# Patient Record
Sex: FEMALE | Race: WHITE | NOT HISPANIC OR LATINO | Employment: PART TIME | URBAN - METROPOLITAN AREA
[De-identification: names, ages, dates, MRNs, and addresses within clinical notes are randomized per-mention and may not be internally consistent; named-entity substitution may affect disease eponyms.]

---

## 2018-06-22 ENCOUNTER — TRANSFERRED RECORDS (OUTPATIENT)
Dept: HEALTH INFORMATION MANAGEMENT | Facility: CLINIC | Age: 23
End: 2018-06-22

## 2018-06-22 LAB — PAP-ABSTRACT: NORMAL

## 2020-03-11 ENCOUNTER — HEALTH MAINTENANCE LETTER (OUTPATIENT)
Age: 25
End: 2020-03-11

## 2020-03-11 ENCOUNTER — OFFICE VISIT (OUTPATIENT)
Dept: FAMILY MEDICINE | Facility: CLINIC | Age: 25
End: 2020-03-11
Payer: COMMERCIAL

## 2020-03-11 VITALS
DIASTOLIC BLOOD PRESSURE: 78 MMHG | HEART RATE: 76 BPM | WEIGHT: 187 LBS | OXYGEN SATURATION: 98 % | BODY MASS INDEX: 28.34 KG/M2 | SYSTOLIC BLOOD PRESSURE: 126 MMHG | HEIGHT: 68 IN | TEMPERATURE: 98.7 F

## 2020-03-11 DIAGNOSIS — Z30.41 ENCOUNTER FOR SURVEILLANCE OF CONTRACEPTIVE PILLS: ICD-10-CM

## 2020-03-11 DIAGNOSIS — F41.1 GAD (GENERALIZED ANXIETY DISORDER): ICD-10-CM

## 2020-03-11 DIAGNOSIS — G43.009 MIGRAINE WITHOUT AURA AND WITHOUT STATUS MIGRAINOSUS, NOT INTRACTABLE: ICD-10-CM

## 2020-03-11 PROCEDURE — 99203 OFFICE O/P NEW LOW 30 MIN: CPT | Performed by: FAMILY MEDICINE

## 2020-03-11 RX ORDER — SUMATRIPTAN 25 MG/1
25 TABLET, FILM COATED ORAL
COMMUNITY
End: 2021-06-16

## 2020-03-11 RX ORDER — NORETHINDRONE AND ETHINYL ESTRADIOL 0.5-0.035
KIT ORAL
COMMUNITY
Start: 2019-11-25 | End: 2020-12-27

## 2020-03-11 RX ORDER — PROMETHAZINE HYDROCHLORIDE 25 MG/1
25 TABLET ORAL EVERY 6 HOURS PRN
COMMUNITY
End: 2020-12-27

## 2020-03-11 RX ORDER — CITALOPRAM HYDROBROMIDE 20 MG/1
20 TABLET ORAL
COMMUNITY
Start: 2019-12-16 | End: 2020-12-27

## 2020-03-11 RX ORDER — CITALOPRAM HYDROBROMIDE 20 MG/1
20 TABLET ORAL DAILY
Qty: 90 TABLET | Refills: 1 | Status: SHIPPED | OUTPATIENT
Start: 2020-03-11 | End: 2020-12-27

## 2020-03-11 RX ORDER — ONDANSETRON 4 MG/1
4 TABLET, FILM COATED ORAL EVERY 8 HOURS PRN
COMMUNITY
End: 2021-06-16

## 2020-03-11 ASSESSMENT — ANXIETY QUESTIONNAIRES
5. BEING SO RESTLESS THAT IT IS HARD TO SIT STILL: NOT AT ALL
3. WORRYING TOO MUCH ABOUT DIFFERENT THINGS: MORE THAN HALF THE DAYS
GAD7 TOTAL SCORE: 10
1. FEELING NERVOUS, ANXIOUS, OR ON EDGE: MORE THAN HALF THE DAYS
IF YOU CHECKED OFF ANY PROBLEMS ON THIS QUESTIONNAIRE, HOW DIFFICULT HAVE THESE PROBLEMS MADE IT FOR YOU TO DO YOUR WORK, TAKE CARE OF THINGS AT HOME, OR GET ALONG WITH OTHER PEOPLE: SOMEWHAT DIFFICULT
2. NOT BEING ABLE TO STOP OR CONTROL WORRYING: SEVERAL DAYS
6. BECOMING EASILY ANNOYED OR IRRITABLE: MORE THAN HALF THE DAYS
7. FEELING AFRAID AS IF SOMETHING AWFUL MIGHT HAPPEN: SEVERAL DAYS

## 2020-03-11 ASSESSMENT — PATIENT HEALTH QUESTIONNAIRE - PHQ9
5. POOR APPETITE OR OVEREATING: MORE THAN HALF THE DAYS
SUM OF ALL RESPONSES TO PHQ QUESTIONS 1-9: 4

## 2020-03-11 ASSESSMENT — MIFFLIN-ST. JEOR: SCORE: 1643.08

## 2020-03-11 NOTE — PROGRESS NOTES
"Subjective     Crhista Erickson is a 24 year old female who presents to clinic today for the following health issues:    HPI     Depression and Anxiety Follow-Up    How are you doing with your depression since your last visit? Improved     How are you doing with your anxiety since your last visit?  Improved     Are you having other symptoms that might be associated with depression or anxiety? Yes:  panic attacks     Have you had a significant life event? No     Do you have any concerns with your use of alcohol or other drugs? No    Social History     Tobacco Use     Smoking status: Never Smoker     Smokeless tobacco: Never Used   Substance Use Topics     Alcohol use: Yes     Comment: occ      Drug use: Never     PHQ 3/11/2020   PHQ-9 Total Score 4   Q9: Thoughts of better off dead/self-harm past 2 weeks Not at all     OPAL-7 SCORE 3/11/2020   Total Score 10       OPAL - Doing very well with medication. No side effects.   Migraines - stable.       Reviewed and updated as needed this visit by Provider         Review of Systems   ROS COMP: Constitutional, HEENT, cardiovascular, pulmonary, gi and gu systems are negative, except as otherwise noted.      Objective    There were no vitals taken for this visit.  There is no height or weight on file to calculate BMI.  Physical Exam   /78 (BP Location: Right arm, Patient Position: Sitting, Cuff Size: Adult Regular)   Pulse 76   Temp 98.7  F (37.1  C) (Temporal)   Ht 1.737 m (5' 8.4\")   Wt 84.8 kg (187 lb)   LMP 03/03/2020 (Exact Date)   SpO2 98%   Breastfeeding No   BMI 28.10 kg/m    GENERAL: healthy, alert and no distress  EYES: Eyes grossly normal to inspection  HENT: nose and mouth without ulcers or lesions  RESP: non labored   MS: no gross musculoskeletal defects noted  SKIN: no suspicious lesions or rashes  NEURO: mentation intact and speech normal  PSYCH: mentation appears normal, affect normal    Diagnostic Test Results:  none         Assessment & Plan     1. " OPAL (generalized anxiety disorder)  - stable, refilled below  - citalopram (CELEXA) 20 MG tablet; Take 1 tablet (20 mg) by mouth daily  Dispense: 90 tablet; Refill: 1    2. Migraine without aura and without status migrainosus, not intractable  - stable     3. Encounter for surveillance of contraceptive pills  - refilled   - norethindrone-ethinyl estradiol (NECON) 0.5-35 MG-MCG tablet; Take 1 tablet by mouth daily  Dispense: 84 tablet; Refill: 3    Return in about 6 months (around 9/11/2020) for Routine Visit, e-visit.    Dallin Powers MD  [facility]

## 2020-03-12 ASSESSMENT — ANXIETY QUESTIONNAIRES: GAD7 TOTAL SCORE: 10

## 2020-03-27 ENCOUNTER — E-VISIT (OUTPATIENT)
Dept: FAMILY MEDICINE | Facility: CLINIC | Age: 25
End: 2020-03-27
Payer: COMMERCIAL

## 2020-03-27 DIAGNOSIS — H92.09 DISCOMFORT OF EAR, UNSPECIFIED LATERALITY: Primary | ICD-10-CM

## 2020-03-27 PROCEDURE — 99421 OL DIG E/M SVC 5-10 MIN: CPT | Performed by: FAMILY MEDICINE

## 2020-11-19 ENCOUNTER — VIRTUAL VISIT (OUTPATIENT)
Dept: FAMILY MEDICINE | Facility: OTHER | Age: 25
End: 2020-11-19

## 2020-11-19 NOTE — PROGRESS NOTES
"Date: 2020 16:58:51  Clinician: Edgar Infante  Clinician NPI: [id number]  Patient: Christa Erickson  Patient : 1995  Patient Address: [address] [place] [address]  Patient Phone: [phone]  Visit Protocol: URI  Patient Summary:  Christa is a 24 year old ( : 1995 ) female who initiated a [facility] Visit for COVID-19 (Coronavirus) evaluation and screening. When asked the question \"Please sign me up to receive news, health information and promotions. \", Christa responded \"No\".    Christa states her symptoms started today. She is experiencing mild difficulty breathing with activities but can speak normally in full sentences.   Christa denies having vomiting, rhinitis, facial pain or pressure, myalgias, chills, malaise, sore throat, teeth pain, ageusia, diarrhea, ear pain, headache, wheezing, fever, cough, nasal congestion, nausea, and anosmia. She also denies taking antibiotic medication in the past month and having recent facial or sinus surgery in the past 60 days.    Pertinent COVID-19 (Coronavirus) information  Christa does not work or volunteer as healthcare worker or a . In the past 14 days, Christa has not worked or volunteered at a healthcare facility or group living setting.   In the past 14 days, she also has not lived in a congregate living setting.   Christa has not had a close contact with a laboratory-confirmed COVID-19 patient within 14 days of symptom onset.    Since 2019, Christa has not been tested for COVID-19 and has had upper respiratory infection (URI) or influenza-like illness.      Date(s) of previous URI or influenza-like illness (free-text): 2020     Symptoms Christa experienced during previous URI or influenza-like illness as reported by the patient (free-text): Nauseas, stuffy nose, sore throat        Pertinent medical history  Christa does not get yeast infections when she takes antibiotics.   Christa does not need a return to work/school note.   " Weight: 186 lbs   Christa does not smoke or use smokeless tobacco.   She denies pregnancy and denies breastfeeding. She is currently menstruating.   Weight: 186 lbs    MEDICATIONS: Celexa oral, ALLERGIES: NKDA  Clinician Response:  Dear Christa,   Your symptoms show that you may have coronavirus (COVID-19). This illness can cause fever, cough and trouble breathing. Many people get a mild case and get better on their own. Some people can get very sick.  What should I do?  We would like to test you for this virus.   1. Please call [phone] to schedule your visit. Explain that you were referred by [facility] to have a COVID-19 test. Be [facility].  * If you need to schedule in [facility] please call [phone] or for [employer] employees please call [phone].  * If you need to schedule in the [place] area please call [phone]. [employer] employees call [phone].  The following will serve as your written order for this COVID Test, ordered by me, for the indication of suspected COVID [Z20.828]: The test will be ordered in [employer], our electronic health record, after you are scheduled. It will show as ordered and authorized by Vern Watkins MD.  Order: COVID-19 (Coronavirus) PCR for SYMPTOMATIC testing from [facility].   2. When it's time for your COVID test:  Stay at least 6 feet away from others. (If someone will drive you to your test, stay in the backseat, as far away from the  as you can.)   Cover your mouth and nose with a mask, tissue or washcloth.  Go straight to the testing site. Don't make any stops on the way there or back.      3.Starting now: Stay home and away from others (self-isolate) until:   You've had no fever---and no medicine that reduces fever---for one full day (24 hours). And...   Your other symptoms have gotten better. For example, your cough or breathing has improved. And...   At least 10 days have passed since your symptoms started.       During this time,  "don't leave the house except for testing or medical care.   Stay in your own room, even for meals. Use your own bathroom if you can.   Stay away from others in your home. No hugging, kissing or shaking hands. No visitors.  Don't go to work, school or anywhere else.    Clean \"high touch\" surfaces often (doorknobs, counters, handles, etc.). Use a household cleaning spray or wipes. You'll find a full list of  on the EPA website: www.epa.gov/pesticide-registration/list-n-disinfectants-use-against-sars-cov-2.   Cover your mouth and nose with a mask, tissue or washcloth to avoid spreading germs.  Wash your hands and face often. Use soap and water.  Caregivers in these groups are at risk for severe illness due to COVID-19:  o People 65 years and older  o People who live in a nursing home or long-term care facility  o People with chronic disease (lung, heart, cancer, diabetes, kidney, liver, immunologic)  o People who have a weakened immune system, including those who:   Are in cancer treatment  Take medicine that weakens the immune system, such as corticosteroids  Had a bone marrow or organ transplant  Have an immune deficiency  Have poorly controlled HIV or AIDS  Are obese (body mass index of 40 or higher)  Smoke regularly   o Caregivers should wear gloves while washing dishes, handling laundry and cleaning bedrooms and bathrooms.  o Use caution when washing and drying laundry: Don't shake dirty laundry, and use the warmest water setting that you can.  o For more tips, go to www.cdc.gov/coronavirus/2019-ncov/downloads/10Things.pdf.    4.Sign up for [employer]. We know it's scary to hear that you might have COVID-19. We want to track your symptoms to make sure you're okay over the next 2 weeks. Please look for an email from Robert Daqi---this is a free, online program that we'll use to keep in touch. To sign up, follow the link in the email. Learn more at [email or web address]  How can I take care of " myself?   Get lots of rest. Drink extra fluids (unless a doctor has told you not to).   Take Tylenol (acetaminophen) for fever or pain. If you have liver or kidney problems, ask your family doctor if it's okay to take Tylenol.   Adults can take either:    650 mg (two 325 mg pills) every 4 to 6 hours, or...   1,000 mg (two 500 mg pills) every 8 hours as needed.    Note: Don't take more than 3,000 mg in one day. Acetaminophen is found in many medicines (both prescribed and over-the-counter medicines). Read all labels to be sure you don't take too much.   For children, check the Tylenol bottle for the right dose. The dose is based on the child's age or weight.    If you have other health problems (like cancer, heart failure, an organ transplant or severe kidney disease): Call your specialty clinic if you don't feel better in the next 2 days.       Know when to call 911. Emergency warning signs include:    Trouble breathing or shortness of breath Pain or pressure in the chest that doesn't go away Feeling confused like you haven't felt before, or not being able to wake up Bluish-colored lips or face.  Where can I get more information?   [facility] -- About COVID-19: [email or web address]   CDC -- What to Do If You're Sick: www.cdc.gov/coronavirus/2019-ncov/about/steps-when-sick.html   CDC -- Ending Home Isolation: www.cdc.gov/coronavirus/2019-ncov/hcp/disposition-in-home-patients.html   CDC -- Caring for Someone: www.cdc.gov/coronavirus/2019-ncov/if-you-are-sick/care-for-someone.html   [facility] -- Interim Guidance for Hospital Discharge to Home: www.health.[facility].mn.us/diseases/coronavirus/hcp/hospdischarge.pdf   [facility] clinical trials (COVID-19 research studies): clinicalaffairs.[facility]/umn-clinical-trials    Below are the COVID-19 hotlines at the [place] Department of Health ([facility]). Interpreters are available.    For health questions: Call 513-787-7274 or 1-846.707.4795 (7 a.m. to 7 p.m.)  For questions about schools and childcare: Call 880-824-5539 or 1-360.320.6612 (7 a.m. to 7 p.m.)    Diagnosis: Contact with and (suspected) exposure to other viral communicable diseases  Diagnosis ICD: Z20.828  Additional Clinician Notes:   If your symptoms are not improving or worsen, please go to one of our urgent care locations for evaluation and possible lab work.

## 2020-11-22 DIAGNOSIS — Z20.822 SUSPECTED COVID-19 VIRUS INFECTION: Primary | ICD-10-CM

## 2020-12-14 ENCOUNTER — OFFICE VISIT (OUTPATIENT)
Dept: DERMATOLOGY | Facility: CLINIC | Age: 25
End: 2020-12-14
Payer: COMMERCIAL

## 2020-12-14 DIAGNOSIS — D22.9 MULTIPLE PIGMENTED NEVI: ICD-10-CM

## 2020-12-14 DIAGNOSIS — Z12.83 SKIN CANCER SCREENING: Primary | ICD-10-CM

## 2020-12-14 DIAGNOSIS — D48.5 NEOPLASM OF UNCERTAIN BEHAVIOR OF SKIN: ICD-10-CM

## 2020-12-14 PROCEDURE — 99203 OFFICE O/P NEW LOW 30 MIN: CPT | Mod: 25 | Performed by: PHYSICIAN ASSISTANT

## 2020-12-14 PROCEDURE — 88305 TISSUE EXAM BY PATHOLOGIST: CPT | Performed by: PATHOLOGY

## 2020-12-14 PROCEDURE — 11102 TANGNTL BX SKIN SINGLE LES: CPT | Performed by: PHYSICIAN ASSISTANT

## 2020-12-14 ASSESSMENT — PAIN SCALES - GENERAL: PAINLEVEL: NO PAIN (0)

## 2020-12-14 NOTE — PATIENT INSTRUCTIONS

## 2020-12-14 NOTE — NURSING NOTE
Dermatology Rooming Note    Christa M Etiennekiana's goals for this visit include:   Chief Complaint   Patient presents with     Skin Check     Christa is here today for a skin check. She is concerned about 6 different spots.     Antonio Lemos CMA

## 2020-12-14 NOTE — NURSING NOTE
Lidocaine-epinephrine 1-1:795068 % injection   1mL once for one use, starting 12/14/2020 ending 12/14/2020,  2mL disp, R-0, injection  Injected by Antonio Lemos CMA

## 2020-12-14 NOTE — LETTER
12/14/2020       RE: Christa Erickson  [address] [facility] [address]     Dear Colleague,    Thank you for referring your patient, Christa Erickson, to the [facility]MATOLOG[facility]. Please see a copy of my visit note below.    [facility] Dermatology Note      Dermatology Problem List:  1.NUB- central upper back, shave bx.   2. Skin cancer screening 12/14/20  CC:   Chief Complaint   Patient presents with     Skin Check     Christa is here today for a skin check. She is concerned about 6 different spots.         Encounter Date: Dec 14, 2020    History of Present Illness:  Ms. Christa Erickson is a 25 year old female who presents in self referral for a skin check. She states she had a lot of sun exposure when she was younger and went the pool every day in the summer. She rarely burned but did at times. Her fiance told her some of her moles have gotten larger. She would like her moles examined. None are painful, bleeding or itchy. She is feeling well, without other skin concerns.     Past Medical History:   Patient Active Problem List   Diagnosis     Migraine headache without aura     OPAL (generalized anxiety disorder)     Past Medical History:   Diagnosis Date     OPAL (generalized anxiety disorder)      Migraine headache without aura      Past Surgical History:   Procedure Laterality Date     DENTAL SURGERY      wisdom     LASIK         Social History:  Patient reports that she has never smoked. She has never used smokeless tobacco. She reports current alcohol use. She reports that she does not use drugs. 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   Family History:  Family History   Problem Relation Age of Onset     Hyperlipidemia Mother      Depression Mother      Allergic rhinitis Mother      Heart Surgery Father      Alcoholism Father      Depression Father      Skin Cancer Maternal Grandfather      Skin Cancer  Paternal Grandfather      Alcoholism Maternal Half-Brother      Melanoma No family hx of      Grandfathers had skin cancer.   Medications:  Current Outpatient Medications   Medication Sig Dispense Refill     citalopram (CELEXA) 20 MG tablet Take 1 tablet (20 mg) by mouth daily DUE FOR APPOINTMENT September 2020-NO FURTHER REFILLS 90 tablet 0     citalopram (CELEXA) 20 MG tablet Take 20 mg by mouth       citalopram (CELEXA) 20 MG tablet Take 1 tablet (20 mg) by mouth daily 90 tablet 1     IBUPROFEN PO Take by mouth as needed       norethindrone-ethinyl estradiol (NECON) 0.5-35 MG-MCG tablet Take 1 tablet by mouth daily 84 tablet 3     norethindrone-ethinyl estradiol (NORTREL 0.5/35, 28,) 0.5-35 MG-MCG tablet TAKE 1 TABLET BY MOUTH EVERY DAY       ondansetron (ZOFRAN) 4 MG tablet Take 4 mg by mouth every 8 hours as needed for nausea       promethazine (PHENERGAN) 25 MG tablet Take 25 mg by mouth every 6 hours as needed for nausea       SUMAtriptan (IMITREX) 25 MG tablet Take 25 mg by mouth at onset of headache for migraine       Allergies   Allergen Reactions     Tree Nuts [Nuts] Itching and Swelling         Review of Systems:  -Skin/Heme New Pt: The patient admits to frequent sun exposure in her youth, but not as often now. No tanning bed use.  The patient denies excessive scarring or problems healing except as per HPI. The patient denies excessive bleeding.  -Constitutional: Otherwise feeling well today, in usual state of health.  -Skin: As above in HPI. No additional skin concerns.    Physical exam:  Vitals: There were no vitals taken for this visit.  GEN: This is a well developed, well-nourished female in no acute distress, in a pleasant mood.    SKIN: Full skin, which includes the head/face, both arms, chest, back, abdomen,both legs, genitalia and/or groin buttocks, digits and/or nails, was examined.  -Lanlgey skin type: II  There is a 6 mm brown variegated macule on the central upper back.   -Multiple  regular brown pigmented macules and papules are identified on the trunk, posterior neck and extremities, less than 50..   -No other lesions of concern on areas examined.     Labs:  None    Impression/Plan:  1. Neoplasm of uncertain behavior on the central upper back. The differential diagnosis includes inflamed nevus vs dysplastic nevus vs mm vs other   - Shave biopsy:  After discussion of benefits and risks including but not limited to bleeding/bruising, pain/swelling, infection, scar, incomplete removal, nerve damage/numbness, recurrence, and non-diagnostic biopsy, written consent, verbal consent and photographs were obtained. Time-out was performed. The area was cleaned with isopropyl alcohol. 0.5ml of 1% lidocaine with 1:100,000 epinephrine was injected to obtain adequate anesthesia. A shave biopsy was performed. Hemostasis was achieved with aluminium chloride. Vaseline and a sterile dressing were applied. The patient tolerated the procedure and no complications were noted. The patient was provided with verbal and written post care instructions.  - will contact patient with the results.     2. Multiple clinically benign nevi on the neck, trunk and extremities, less than 50  - ABCDs of melanoma were discussed and self skin checks were advised.    Sun precaution was advised including the use of sun screens of SPF 30 or higher, sun protective clothing, and avoidance of tanning beds.        Staff Involved:  Staff Only    All risks, benefits and alternatives were discussed with patient.  Patient is in agreement and understands the assessment and plan.  All questions were answered.  Sun Screen Education was given.   Return to Clinic in 1-2 yrs or sooner as needed.   Ruth Jones PA-C

## 2020-12-14 NOTE — PROGRESS NOTES
[facility] Dermatology Note      Dermatology Problem List:  1.NUB- central upper back, shave bx.   2. Skin cancer screening 12/14/20  CC:   Chief Complaint   Patient presents with     Skin Check     Christa is here today for a skin check. She is concerned about 6 different spots.         Encounter Date: Dec 14, 2020    History of Present Illness:  Ms. Christa Erickson is a 25 year old female who presents in self referral for a skin check. She states she had a lot of sun exposure when she was younger and went the pool every day in the summer. She rarely burned but did at times. Her fiance told her some of her moles have gotten larger. She would like her moles examined. None are painful, bleeding or itchy. She is feeling well, without other skin concerns.     Past Medical History:   Patient Active Problem List   Diagnosis     Migraine headache without aura     OPAL (generalized anxiety disorder)     Past Medical History:   Diagnosis Date     OPAL (generalized anxiety disorder)      Migraine headache without aura      Past Surgical History:   Procedure Laterality Date     DENTAL SURGERY      wisdom     LASIK         Social History:  Patient reports that she has never smoked. She has never used smokeless tobacco. She reports current alcohol use. She reports that she does not use drugs. 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   Family History:  Family History   Problem Relation Age of Onset     Hyperlipidemia Mother      Depression Mother      Allergic rhinitis Mother      Heart Surgery Father      Alcoholism Father      Depression Father      Skin Cancer Maternal Grandfather      Skin Cancer Paternal Grandfather      Alcoholism Maternal Half-Brother      Melanoma No family hx of      Grandfathers had skin cancer.   Medications:  Current Outpatient Medications   Medication Sig Dispense Refill     citalopram (CELEXA) 20 MG tablet Take 1 tablet (20 mg) by mouth daily DUE FOR APPOINTMENT September  2020-NO FURTHER REFILLS 90 tablet 0     citalopram (CELEXA) 20 MG tablet Take 20 mg by mouth       citalopram (CELEXA) 20 MG tablet Take 1 tablet (20 mg) by mouth daily 90 tablet 1     IBUPROFEN PO Take by mouth as needed       norethindrone-ethinyl estradiol (NECON) 0.5-35 MG-MCG tablet Take 1 tablet by mouth daily 84 tablet 3     norethindrone-ethinyl estradiol (NORTREL 0.5/35, 28,) 0.5-35 MG-MCG tablet TAKE 1 TABLET BY MOUTH EVERY DAY       ondansetron (ZOFRAN) 4 MG tablet Take 4 mg by mouth every 8 hours as needed for nausea       promethazine (PHENERGAN) 25 MG tablet Take 25 mg by mouth every 6 hours as needed for nausea       SUMAtriptan (IMITREX) 25 MG tablet Take 25 mg by mouth at onset of headache for migraine       Allergies   Allergen Reactions     Tree Nuts [Nuts] Itching and Swelling         Review of Systems:  -Skin/Heme New Pt: The patient admits to frequent sun exposure in her youth, but not as often now. No tanning bed use.  The patient denies excessive scarring or problems healing except as per HPI. The patient denies excessive bleeding.  -Constitutional: Otherwise feeling well today, in usual state of health.  -Skin: As above in HPI. No additional skin concerns.    Physical exam:  Vitals: There were no vitals taken for this visit.  GEN: This is a well developed, well-nourished female in no acute distress, in a pleasant mood.    SKIN: Full skin, which includes the head/face, both arms, chest, back, abdomen,both legs, genitalia and/or groin buttocks, digits and/or nails, was examined.  -Langley skin type: II  There is a 6 mm brown variegated macule on the central upper back.   -Multiple regular brown pigmented macules and papules are identified on the trunk, posterior neck and extremities, less than 50..   -No other lesions of concern on areas examined.     Labs:  None    Impression/Plan:  1. Neoplasm of uncertain behavior on the central upper back. The differential diagnosis includes inflamed  nevus vs dysplastic nevus vs mm vs other   - Shave biopsy:  After discussion of benefits and risks including but not limited to bleeding/bruising, pain/swelling, infection, scar, incomplete removal, nerve damage/numbness, recurrence, and non-diagnostic biopsy, written consent, verbal consent and photographs were obtained. Time-out was performed. The area was cleaned with isopropyl alcohol. 0.5ml of 1% lidocaine with 1:100,000 epinephrine was injected to obtain adequate anesthesia. A shave biopsy was performed. Hemostasis was achieved with aluminium chloride. Vaseline and a sterile dressing were applied. The patient tolerated the procedure and no complications were noted. The patient was provided with verbal and written post care instructions.  - will contact patient with the results.     2. Multiple clinically benign nevi on the neck, trunk and extremities, less than 50  - ABCDs of melanoma were discussed and self skin checks were advised.    Sun precaution was advised including the use of sun screens of SPF 30 or higher, sun protective clothing, and avoidance of tanning beds.        Staff Involved:  Staff Only    All risks, benefits and alternatives were discussed with patient.  Patient is in agreement and understands the assessment and plan.  All questions were answered.  Sun Screen Education was given.   Return to Clinic in 1-2 yrs or sooner as needed.   Ruth Jones PA-C

## 2020-12-16 LAB — COPATH REPORT: NORMAL

## 2020-12-21 DIAGNOSIS — F41.1 GAD (GENERALIZED ANXIETY DISORDER): ICD-10-CM

## 2020-12-24 ENCOUNTER — MYC MEDICAL ADVICE (OUTPATIENT)
Dept: FAMILY MEDICINE | Facility: CLINIC | Age: 25
End: 2020-12-24

## 2020-12-24 RX ORDER — CITALOPRAM HYDROBROMIDE 20 MG/1
TABLET ORAL
Qty: 90 TABLET | Refills: 0 | OUTPATIENT
Start: 2020-12-24

## 2020-12-24 NOTE — TELEPHONE ENCOUNTER
Promise Greer MD  Hp Triage 2 hours ago (8:07 AM)     Was told needed an apt in sept      MyChat message sent to patient.    YOAN BlackN, RN

## 2020-12-27 PROBLEM — H52.209 ASTIGMATISM: Status: ACTIVE | Noted: 2020-12-27

## 2020-12-28 ENCOUNTER — VIRTUAL VISIT (OUTPATIENT)
Dept: FAMILY MEDICINE | Facility: CLINIC | Age: 25
End: 2020-12-28
Payer: COMMERCIAL

## 2020-12-28 DIAGNOSIS — G43.009 MIGRAINE WITHOUT AURA AND WITHOUT STATUS MIGRAINOSUS, NOT INTRACTABLE: ICD-10-CM

## 2020-12-28 DIAGNOSIS — F41.1 GAD (GENERALIZED ANXIETY DISORDER): Primary | ICD-10-CM

## 2020-12-28 PROCEDURE — 99214 OFFICE O/P EST MOD 30 MIN: CPT | Mod: 95 | Performed by: FAMILY MEDICINE

## 2020-12-28 RX ORDER — CITALOPRAM HYDROBROMIDE 20 MG/1
20 TABLET ORAL DAILY
Qty: 90 TABLET | Refills: 0 | Status: SHIPPED | OUTPATIENT
Start: 2020-12-28 | End: 2021-03-29

## 2020-12-28 ASSESSMENT — PATIENT HEALTH QUESTIONNAIRE - PHQ9
SUM OF ALL RESPONSES TO PHQ QUESTIONS 1-9: 0
SUM OF ALL RESPONSES TO PHQ QUESTIONS 1-9: 0

## 2020-12-28 ASSESSMENT — ANXIETY QUESTIONNAIRES
GAD7 TOTAL SCORE: 0
GAD7 TOTAL SCORE: 0
5. BEING SO RESTLESS THAT IT IS HARD TO SIT STILL: NOT AT ALL
2. NOT BEING ABLE TO STOP OR CONTROL WORRYING: NOT AT ALL
4. TROUBLE RELAXING: NOT AT ALL
7. FEELING AFRAID AS IF SOMETHING AWFUL MIGHT HAPPEN: NOT AT ALL
3. WORRYING TOO MUCH ABOUT DIFFERENT THINGS: NOT AT ALL
1. FEELING NERVOUS, ANXIOUS, OR ON EDGE: NOT AT ALL
GAD7 TOTAL SCORE: 0
6. BECOMING EASILY ANNOYED OR IRRITABLE: NOT AT ALL
7. FEELING AFRAID AS IF SOMETHING AWFUL MIGHT HAPPEN: NOT AT ALL

## 2020-12-28 NOTE — PATIENT INSTRUCTIONS
Medication citalopram refilled   Counseling not needed currently  Further routine care and refills by PCP Dr Bobby, due for a physical with her and pap etc update vaccines and labs in march 2021   abd pain/n/v/d/

## 2020-12-28 NOTE — PROGRESS NOTES
"Christa Erickson is a 25 year old female who is being evaluated via a billable video visit.      The patient has been notified of following:     \"This video visit will be conducted via a call between you and your physician/provider. We have found that certain health care needs can be provided without the need for an in-person physical exam.  This service lets us provide the care you need with a video conversation.  If a prescription is necessary we can send it directly to your pharmacy.  If lab work is needed we can place an order for that and you can then stop by our lab to have the test done at a later time.    Video visits are billed at different rates depending on your insurance coverage.  Please reach out to your insurance provider with any questions.    If during the course of the call the physician/provider feels a video visit is not appropriate, you will not be charged for this service.\"    Patient has given verbal consent for Video visit? Yes  How would you like to obtain your AVS? MyChart  If you are dropped from the video visit, the video invite should be resent to: Text to cell phone: [phone]  Will anyone else be joining your video visit? No  Subjective     Christa Erickson is a 25 year old female who presents today via video visit for the following health issues:    HPI     Anxiety Follow-Up    How are you doing with your anxiety since your last visit? No change    Are you having other symptoms that might be associated with anxiety? No    Have you had a significant life event? No     Are you feeling depressed? No    Do you have any concerns with your use of alcohol or other drugs? No    Social History     Tobacco Use     Smoking status: Never Smoker     Smokeless tobacco: Never Used   Substance Use Topics     Alcohol use: Yes     Comment: occ      Drug use: Never     OPAL-7 SCORE 3/11/2020 12/28/2020   Total Score - 0 (minimal anxiety)   Total Score 10 0     PHQ 3/11/2020 12/28/2020   PHQ-9 Total Score 4 " 0   Q9: Thoughts of better off dead/self-harm past 2 weeks Not at all Not at all     Last PHQ-9 12/28/2020   1.  Little interest or pleasure in doing things 0   2.  Feeling down, depressed, or hopeless 0   3.  Trouble falling or staying asleep, or sleeping too much 0   4.  Feeling tired or having little energy 0   5.  Poor appetite or overeating 0   6.  Feeling bad about yourself 0   7.  Trouble concentrating 0   8.  Moving slowly or restless 0   Q9: Thoughts of better off dead/self-harm past 2 weeks 0   PHQ-9 Total Score 0   Difficulty at work, home, or with people -     OPAL-7  12/28/2020   1. Feeling nervous, anxious, or on edge 0   2. Not being able to stop or control worrying 0   3. Worrying too much about different things 0   4. Trouble relaxing 0   5. Being so restless that it is hard to sit still 0   6. Becoming easily annoyed or irritable 0   7. Feeling afraid, as if something awful might happen 0   OPAL-7 Total Score 0   If you checked any problems, how difficult have they made it for you to do your work, take care of things at home, or get along with other people? -         How many servings of fruits and vegetables do you eat daily?  4 or more    On average, how many sweetened beverages do you drink each day (Examples: soda, juice, sweet tea, etc.  Do NOT count diet or artificially sweetened beverages)?   0    How many days per week do you exercise enough to make your heart beat faster? 4    How many minutes a day do you exercise enough to make your heart beat faster? 20 - 29    How many days per week do you miss taking your medication? 0  Answers for HPI/ROS submitted by the patient on 12/28/2020   PHQ9 TOTAL SCORE: 0  OPAL 7 TOTAL SCORE: 0    Video Start Time: 900    Hx of OPAL on citalopram 20 mg , noted hx of migraines without aura by PCP , but in prior notes outside system hx of migraine with aura, on Imitrex and Zofran prn, previously on Phenergan, and also on BCP, hx of allergic rhinitis, astigmatism,  "hx of wisdom tooth extraction, allergic to tree nuts, under care of PCP Dr Rodríguez, seen by PCP 3/11/20 for med refill on citalopram. E visit done 3/27 for same. Oncare done 11/19/20 for URI symptoms, covid ordered but results not seen, seen by derm 12/14/20 for skin check and biopsy came back with a benign mole.  Apt made by video with this writer to get med for anxiety refilled.    On citalopram 20 mg since 2016.  Controlled on it   No side effects  Used to do therapy not any more  Coping with pandemic fine  Works in an elementary school, distance and going back in person  Lives with finance and two cats    migraine sunder control uses imitrex and Zofran prn  No aura  On BCP      Review of Systems   Constitutional, HEENT, cardiovascular, pulmonary, GI, , musculoskeletal, neuro, skin, endocrine and psych systems are negative, except as otherwise noted.      Objective    Vitals - Patient Reported  Weight (Patient Reported): 82.6 kg (182 lb)  Height (Patient Reported): 172.7 cm (5' 8\")  BMI (Based on Pt Reported Ht/Wt): 27.67        Physical Exam     GENERAL: Healthy, alert and no distress  EYES: Eyes grossly normal to inspection.  No discharge or erythema, or obvious scleral/conjunctival abnormalities.  RESP: No audible wheeze, cough, or visible cyanosis.  No visible retractions or increased work of breathing.    SKIN: Visible skin clear. No significant rash, abnormal pigmentation or lesions.  NEURO: Cranial nerves grossly intact.  Mentation and speech appropriate for age.  PSYCH: Mentation appears normal, affect normal/bright, judgement and insight intact, normal speech and appearance well-groomed.      No results found for any visits on 12/28/20.        Assessment & Plan     Christa was seen today for anxiety.    Diagnoses and all orders for this visit:    OPAL (generalized anxiety disorder)  -     citalopram (CELEXA) 20 MG tablet; Take 1 tablet (20 mg) by mouth daily    Migraine without aura and without status " "migrainosus, not intractable         BMI:   Estimated body mass index is 28.1 kg/m  as calculated from the following:    Height as of 3/11/20: 1.737 m (5' 8.4\").    Weight as of 3/11/20: 84.8 kg (187 lb).   Weight management plan: Patient was referred to their PCP to discuss a diet and exercise plan.       Doing well   No issues  Medication citalopram refilled   Counseling not needed currently  Further routine care and refills by PCP Dr Ha, due for a physical with her and pap etc update vaccines and labs in march 2021    Regular exercise  See Patient Instructions    Return in about 3 months (around 3/28/2021) for Routine preventive dr ha.    Promise Greer MD  [facility]      Video-Visit Details    Type of service:  Video Visit    Video End Time:852    Originating Location (pt. Location): Home    Distant Location (provider location):  [facility]     Platform used for Video Visit: Ce          "

## 2020-12-29 ASSESSMENT — PATIENT HEALTH QUESTIONNAIRE - PHQ9: SUM OF ALL RESPONSES TO PHQ QUESTIONS 1-9: 0

## 2020-12-29 ASSESSMENT — ANXIETY QUESTIONNAIRES: GAD7 TOTAL SCORE: 0

## 2021-03-15 DIAGNOSIS — Z30.41 ENCOUNTER FOR SURVEILLANCE OF CONTRACEPTIVE PILLS: ICD-10-CM

## 2021-03-17 RX ORDER — NORETHINDRONE AND ETHINYL ESTRADIOL 0.5-0.035
1 KIT ORAL DAILY
Qty: 84 TABLET | Refills: 0 | Status: SHIPPED | OUTPATIENT
Start: 2021-03-17 | End: 2021-05-06

## 2021-03-17 NOTE — TELEPHONE ENCOUNTER
Hormone med failed prot. No annual BP.  12/28/20 was last virtual visit.  Sending to PCP.  BOBBI Monsalve

## 2021-04-25 ENCOUNTER — HEALTH MAINTENANCE LETTER (OUTPATIENT)
Age: 26
End: 2021-04-25

## 2021-05-03 DIAGNOSIS — F41.1 GAD (GENERALIZED ANXIETY DISORDER): ICD-10-CM

## 2021-05-04 RX ORDER — CITALOPRAM HYDROBROMIDE 20 MG/1
TABLET ORAL
Qty: 3 TABLET | Refills: 0 | Status: SHIPPED | OUTPATIENT
Start: 2021-05-04 | End: 2021-05-06

## 2021-05-04 NOTE — TELEPHONE ENCOUNTER
Pt has appt on 5/6/20 with PCP.  I talked to pt.  She will run out of meds.  Sent in 3 tabs.  Pt is aware.  BOBBI Monsalve

## 2021-05-06 ENCOUNTER — OFFICE VISIT (OUTPATIENT)
Dept: FAMILY MEDICINE | Facility: CLINIC | Age: 26
End: 2021-05-06
Payer: COMMERCIAL

## 2021-05-06 VITALS
SYSTOLIC BLOOD PRESSURE: 127 MMHG | HEIGHT: 67 IN | BODY MASS INDEX: 29.82 KG/M2 | OXYGEN SATURATION: 97 % | HEART RATE: 75 BPM | WEIGHT: 190 LBS | RESPIRATION RATE: 16 BRPM | DIASTOLIC BLOOD PRESSURE: 77 MMHG | TEMPERATURE: 97.8 F

## 2021-05-06 DIAGNOSIS — Z30.41 ENCOUNTER FOR SURVEILLANCE OF CONTRACEPTIVE PILLS: ICD-10-CM

## 2021-05-06 DIAGNOSIS — F41.1 GAD (GENERALIZED ANXIETY DISORDER): ICD-10-CM

## 2021-05-06 PROCEDURE — 99213 OFFICE O/P EST LOW 20 MIN: CPT | Performed by: FAMILY MEDICINE

## 2021-05-06 RX ORDER — NORETHINDRONE AND ETHINYL ESTRADIOL 0.5-0.035
1 KIT ORAL DAILY
Qty: 84 TABLET | Refills: 3 | Status: SHIPPED | OUTPATIENT
Start: 2021-05-06 | End: 2022-04-07

## 2021-05-06 RX ORDER — CITALOPRAM HYDROBROMIDE 20 MG/1
20 TABLET ORAL DAILY
Qty: 90 TABLET | Refills: 1 | Status: SHIPPED | OUTPATIENT
Start: 2021-05-06 | End: 2021-11-18

## 2021-05-06 ASSESSMENT — MIFFLIN-ST. JEOR: SCORE: 1633.42

## 2021-05-06 NOTE — PROGRESS NOTES
Assessment & Plan     1. OPAL (generalized anxiety disorder)  - stable, refill below  - citalopram (CELEXA) 20 MG tablet; Take 1 tablet (20 mg) by mouth daily  Dispense: 90 tablet; Refill: 1  - e-visit in 6 months    2. Encounter for surveillance of contraceptive pills  - stable refill for one year   - norethindrone-ethinyl estradiol (NORTREL 0.5/35, 28,) 0.5-35 MG-MCG tablet; Take 1 tablet by mouth daily  Dispense: 84 tablet; Refill: 3    Pt will schedule physical in three months. Defer tetanus booster until physical.      Dallin Powers MD  [facility]    Ilsa Goodwin is a 25 year old who presents for the following health issues     HPI   Pt states she came in today for a med follow up.   OPAL - stable, no side effects from medication   Needs birth control refills. No side effects.No missed doses. Migraine without aura.       Review of Systems         Objective    There were no vitals taken for this visit.  There is no height or weight on file to calculate BMI.  Physical Exam

## 2021-06-13 ENCOUNTER — MYC MEDICAL ADVICE (OUTPATIENT)
Dept: FAMILY MEDICINE | Facility: CLINIC | Age: 26
End: 2021-06-13

## 2021-06-13 DIAGNOSIS — G43.809 OTHER MIGRAINE WITHOUT STATUS MIGRAINOSUS, NOT INTRACTABLE: Primary | ICD-10-CM

## 2021-06-16 NOTE — TELEPHONE ENCOUNTER
LOV: 5/6/21    Patient requesting refill of Imitrex and Zofran--both are historical--    Please advise.  meds pended with pharmacy loaded.    Thanks! Soniya Jorge RN

## 2021-06-17 RX ORDER — ONDANSETRON 4 MG/1
4 TABLET, FILM COATED ORAL EVERY 8 HOURS PRN
Qty: 30 TABLET | Refills: 1 | Status: SHIPPED | OUTPATIENT
Start: 2021-06-17 | End: 2022-03-23

## 2021-06-17 RX ORDER — SUMATRIPTAN 25 MG/1
TABLET, FILM COATED ORAL
Qty: 9 TABLET | Refills: 3 | Status: SHIPPED | OUTPATIENT
Start: 2021-06-17 | End: 2024-03-12

## 2021-10-10 ENCOUNTER — HEALTH MAINTENANCE LETTER (OUTPATIENT)
Age: 26
End: 2021-10-10

## 2021-11-18 ENCOUNTER — VIRTUAL VISIT (OUTPATIENT)
Dept: INTERNAL MEDICINE | Facility: CLINIC | Age: 26
End: 2021-11-18
Payer: COMMERCIAL

## 2021-11-18 DIAGNOSIS — F41.1 GAD (GENERALIZED ANXIETY DISORDER): ICD-10-CM

## 2021-11-18 PROCEDURE — 99213 OFFICE O/P EST LOW 20 MIN: CPT | Mod: 95 | Performed by: INTERNAL MEDICINE

## 2021-11-18 RX ORDER — CITALOPRAM HYDROBROMIDE 20 MG/1
20 TABLET ORAL DAILY
Qty: 90 TABLET | Refills: 1 | Status: SHIPPED | OUTPATIENT
Start: 2021-11-18 | End: 2022-05-29

## 2021-11-18 NOTE — PROGRESS NOTES
"Christa is a 25 year old who is being evaluated via a billable telephone visit.      What phone number would you like to be contacted at? [phone]  How would you like to obtain your AVS? MyChart    {PROVIDER CHARTING PREFERENCE:554191}    Subjective   Christa is a 25 year old who presents for the following health issues {ACCOMPANIED BY STATEMENT (Optional):800563}    HPI     {SUPERLIST (Optional):735012}  {additonal problems for provider to add (Optional):282014}    Review of Systems   {ROS COMP (Optional):749741}      Objective           Vitals:  No vitals were obtained today due to virtual visit.    Physical Exam   {GENERAL APPEARANCE:50::\"healthy\",\"alert\",\"no distress\"}  PSYCH: Alert and oriented times 3; coherent speech, normal   rate and volume, able to articulate logical thoughts, able   to abstract reason, no tangential thoughts, no hallucinations   or delusions  Her affect is { :8883012::\"normal\"}  RESP: No cough, no audible wheezing, able to talk in full sentences  Remainder of exam unable to be completed due to telephone visits    {Diagnostic Test Results (Optional):667162}    {AMBULATORY ATTESTATION (Optional):741916}        Phone call duration: *** minutes  "

## 2021-11-18 NOTE — PROGRESS NOTES
Christa Erickson is a 25 year oldwho is being evaluated via a billable telephone visit.       What phone number would you like to be contacted at? [phone]      Assessment & Plan  Generalized anxiety disorder 1 citalopram refilled tolerates it well and works well for generalized anxiety disorder.  No ill effects no suicidal or depressive features.     11 minutes spent on the date of the encounter doing chart review, patient visit and documentation        Subjective   No new complaints generalized anxiety disorder better with citalopram I encourage the patient to see her PCP before May 2022.    Last seen by her PCP sometime in May 2021     Review of Systems   No blood in stool or urine medication list reviewed reconciled.  No chest pain shortness of breath.  No history of seizures.       Angel Neumann MD

## 2021-12-31 ENCOUNTER — OFFICE VISIT (OUTPATIENT)
Dept: FAMILY MEDICINE | Facility: CLINIC | Age: 26
End: 2021-12-31
Payer: COMMERCIAL

## 2021-12-31 VITALS
HEIGHT: 67 IN | BODY MASS INDEX: 29.03 KG/M2 | OXYGEN SATURATION: 96 % | HEART RATE: 94 BPM | SYSTOLIC BLOOD PRESSURE: 114 MMHG | DIASTOLIC BLOOD PRESSURE: 73 MMHG | WEIGHT: 185 LBS

## 2021-12-31 DIAGNOSIS — N89.8 VAGINAL DISCHARGE: ICD-10-CM

## 2021-12-31 DIAGNOSIS — Z13.1 DIABETES MELLITUS SCREENING: ICD-10-CM

## 2021-12-31 DIAGNOSIS — Z12.4 CERVICAL CANCER SCREENING: ICD-10-CM

## 2021-12-31 DIAGNOSIS — R25.1 SHAKINESS: ICD-10-CM

## 2021-12-31 DIAGNOSIS — R42 LIGHTHEADEDNESS: ICD-10-CM

## 2021-12-31 DIAGNOSIS — Z00.00 ROUTINE ADULT HEALTH MAINTENANCE: Primary | ICD-10-CM

## 2021-12-31 LAB
ANION GAP SERPL CALCULATED.3IONS-SCNC: 9 MMOL/L (ref 5–18)
BUN SERPL-MCNC: 11 MG/DL (ref 8–22)
CALCIUM SERPL-MCNC: 9.1 MG/DL (ref 8.5–10.5)
CHLORIDE BLD-SCNC: 107 MMOL/L (ref 98–107)
CLUE CELLS: ABNORMAL
CO2 SERPL-SCNC: 25 MMOL/L (ref 22–31)
CREAT SERPL-MCNC: 0.69 MG/DL (ref 0.6–1.1)
GFR SERPL CREATININE-BSD FRML MDRD: >90 ML/MIN/1.73M2
GLUCOSE BLD-MCNC: 91 MG/DL (ref 70–125)
HBA1C MFR BLD: 5.1 % (ref 0–5.6)
HGB BLD-MCNC: 14.5 G/DL (ref 11.7–15.7)
POTASSIUM BLD-SCNC: 4.6 MMOL/L (ref 3.5–5)
SODIUM SERPL-SCNC: 141 MMOL/L (ref 136–145)
TRICHOMONAS, WET PREP: ABNORMAL
TSH SERPL DL<=0.005 MIU/L-ACNC: 1.24 UIU/ML (ref 0.3–5)
WBC'S/HIGH POWER FIELD, WET PREP: ABNORMAL
YEAST, WET PREP: ABNORMAL

## 2021-12-31 PROCEDURE — 87210 SMEAR WET MOUNT SALINE/INK: CPT | Performed by: FAMILY MEDICINE

## 2021-12-31 PROCEDURE — 99213 OFFICE O/P EST LOW 20 MIN: CPT | Mod: 25 | Performed by: FAMILY MEDICINE

## 2021-12-31 PROCEDURE — 90471 IMMUNIZATION ADMIN: CPT | Performed by: FAMILY MEDICINE

## 2021-12-31 PROCEDURE — G0123 SCREEN CERV/VAG THIN LAYER: HCPCS | Performed by: FAMILY MEDICINE

## 2021-12-31 PROCEDURE — 80048 BASIC METABOLIC PNL TOTAL CA: CPT | Performed by: FAMILY MEDICINE

## 2021-12-31 PROCEDURE — 90715 TDAP VACCINE 7 YRS/> IM: CPT | Performed by: FAMILY MEDICINE

## 2021-12-31 PROCEDURE — 83036 HEMOGLOBIN GLYCOSYLATED A1C: CPT | Performed by: FAMILY MEDICINE

## 2021-12-31 PROCEDURE — 85018 HEMOGLOBIN: CPT | Performed by: FAMILY MEDICINE

## 2021-12-31 PROCEDURE — 99395 PREV VISIT EST AGE 18-39: CPT | Mod: 25 | Performed by: FAMILY MEDICINE

## 2021-12-31 PROCEDURE — 36415 COLL VENOUS BLD VENIPUNCTURE: CPT | Performed by: FAMILY MEDICINE

## 2021-12-31 PROCEDURE — 84443 ASSAY THYROID STIM HORMONE: CPT | Performed by: FAMILY MEDICINE

## 2021-12-31 ASSESSMENT — ANXIETY QUESTIONNAIRES
GAD7 TOTAL SCORE: 0
7. FEELING AFRAID AS IF SOMETHING AWFUL MIGHT HAPPEN: NOT AT ALL
5. BEING SO RESTLESS THAT IT IS HARD TO SIT STILL: NOT AT ALL
2. NOT BEING ABLE TO STOP OR CONTROL WORRYING: NOT AT ALL
3. WORRYING TOO MUCH ABOUT DIFFERENT THINGS: NOT AT ALL
6. BECOMING EASILY ANNOYED OR IRRITABLE: NOT AT ALL
1. FEELING NERVOUS, ANXIOUS, OR ON EDGE: NOT AT ALL
GAD7 TOTAL SCORE: 0
7. FEELING AFRAID AS IF SOMETHING AWFUL MIGHT HAPPEN: NOT AT ALL
4. TROUBLE RELAXING: NOT AT ALL

## 2021-12-31 ASSESSMENT — MIFFLIN-ST. JEOR: SCORE: 1601.78

## 2021-12-31 NOTE — PROGRESS NOTES
Assessment/Plan:   Christa is a 26-year-old female here for physical with additional concern.    Routine adult health maintenance  Physical completed today.  Labs as below.  Up-to-date with immunizations, Tdap administered today.  Pap smear completed today.  - TDAP VACCINE (Adacel, Boostrix)  [9674642]    Cervical cancer screening  Due for Pap smear, completed today.  - Pap screen reflex to HPV if ASCUS - recommend age 25 - 29    Shakiness  Lightheadedness  Diabetes mellitus screening  Somewhat unclear cause of symptoms, could be vasovagal response vs hypoglycemia from prolonged time between meals. Will check labs as below. If persists would consider EKG/heart monitor.   - Basic metabolic panel  (Ca, Cl, CO2, Creat, Gluc, K, Na, BUN)  - Hemoglobin A1c  - TSH with free T4 reflex  - Hemoglobin    Vaginal discharge  Vaginal discharge noted on exam, wet prep obtained today, will treat with pills if needed.  - Wet prep - Clinic Collect       I have had an Advance Directives discussion with the patient.  The following high BMI interventions were performed this visit: encouragement to exercise and lifestyle education regarding diet    Follow up: 1 year for physical, sooner as needed    Emily Avalos MD  [facility]      Subjective:     Christa Erickson is a 26 year old female who presents for an annual exam.     Answers for HPI/ROS submitted by the patient on 12/29/2021  Frequency of exercise:: 2-3 days/week  Getting at least 3 servings of Calcium per day:: Yes  Diet:: Regular (no restrictions)  Taking medications regularly:: Yes  Medication side effects:: None  Bi-annual eye exam:: NO   Dental care twice a year:: Yes  Sleep apnea or symptoms of sleep apnea:: None  Additional concerns today:: Yes  Duration of exercise:: 15-30 minutes    Worried about blood sugars  -sometimes will get shaky throughout the day - unclear if related to diet  -1-2x/week, lasts approx 20 mins  -typically will eat something and feel  better  -no assoc anxiety/panic attacks  -is staying really hydrated with water  -sometimes assoc with dizziness/lightheaded - no fainting  -no chest pain, SOB, palpitations  -probably more when moving around   -more often morning or around lunch    Health Maintenance reviewed:  Lipid Profile: no  Glucose Screen: yes  Colonoscopy: no  Mammogram: no    Gynecologic History  Regular on OCPs  Contraception: oral contraceptive  Last Pap: 2018. Results were: nml    Immunization History   Administered Date(s) Administered     COVID-19,PF,Moderna 02/26/2021, 03/26/2021     COVID-19,PF,Pfizer (12+ Yrs) 11/01/2021     DTAP (<7y) 08/18/1995, 09/02/1999     Flu, Unspecified 12/01/2006     HPV Quadrivalent 08/22/2012     HPV9 07/09/2015, 08/17/2017     Hep B, Peds or Adolescent 1993, 1993, 07/21/1995     HepA-ped 2 Dose 08/22/2012     Hib, Unspecified 01/21/1994, 04/22/1994, 06/17/1994     Historic Hib Hib-titer 01/21/1994, 04/22/1994, 06/17/1994     Historical DTP/aP 01/21/1994, 04/22/1994, 06/17/1994     Influenza (IIV3) PF 12/01/2006, 11/03/2015     Influenza Vaccine IM > 6 months Valent IIV4 (Alfuria,Fluzone) 11/27/2019, 11/11/2021     Influenza,INJ,MDCK,PF,Quad >4yrs 11/27/2019, 10/31/2020     MMR 07/21/1995, 09/02/1999     Meningococcal (Menactra ) 11/19/2008, 08/22/2012     Meningococcal (Menveo ) 11/19/2008, 08/22/2012     OPV, unspecified 01/21/1994, 04/22/1994, 06/17/1994, 08/18/1995     TDAP Vaccine (Adacel) 11/19/2008     Immunization status: Up-to-date.    Current Outpatient Medications   Medication Sig Dispense Refill     citalopram (CELEXA) 20 MG tablet Take 1 tablet (20 mg) by mouth daily 90 tablet 1     norethindrone-ethinyl estradiol (NORTREL 0.5/35, 28,) 0.5-35 MG-MCG tablet Take 1 tablet by mouth daily 84 tablet 3     ondansetron (ZOFRAN) 4 MG tablet Take 1 tablet (4 mg) by mouth every 8 hours as needed for nausea 30 tablet 1     SUMAtriptan (IMITREX) 25 MG tablet 25 to 50 mg once at onset of  headache, may repeat a dose after 2 hours, 200 mg per 24 hours. 9 tablet 3     Past Medical History:   Diagnosis Date     Abdominal migraine 7/9/2015     OPAL (generalized anxiety disorder)      Migraine headache without aura      Past Surgical History:   Procedure Laterality Date     DENTAL SURGERY      wisdom     LASIK       Tree nuts [nuts]  Family History   Problem Relation Age of Onset     Hyperlipidemia Mother      Depression Mother      Allergic rhinitis Mother      Other - See Comments Mother         hyperglycemia     Heart Surgery Father      Alcoholism Father      Depression Father      Nephrolithiasis Maternal Grandmother      Skin Cancer Maternal Grandfather      No Known Problems Paternal Grandmother      Skin Cancer Paternal Grandfather      Alcoholism Maternal Half-Brother      No Known Problems Maternal Half-Sister      Melanoma No family hx of      Social History     Socioeconomic History     Marital status: Single     Spouse name: Not on file     Number of children: Not on file     Years of education: Not on file     Highest education level: Not on file   Occupational History     Not on file   Tobacco Use     Smoking status: Never Smoker     Smokeless tobacco: Never Used   Substance and Sexual Activity     Alcohol use: Yes     Comment: occ      Drug use: Never     Sexual activity: Yes   Other Topics Concern     Parent/sibling w/ CABG, MI or angioplasty before 65F 55M? Not Asked   Social History Narrative    Dec 2020: , lives with finance & 2 cats      Social Determinants of Health     Financial Resource Strain: Not on file   Food Insecurity: Not on file   Transportation Needs: Not on file   Physical Activity: Not on file   Stress: Not on file   Social Connections: Not on file   Intimate Partner Violence: Not on file   Housing Stability: Not on file       Review of Systems negative unless noted    Objective:        Vitals:    12/31/21 1425   BP: 114/73   Pulse: 94   SpO2: 96%  "  Weight: 83.9 kg (185 lb)   Height: 1.702 m (5' 7\")     Body mass index is 28.98 kg/m .    Physical Exam:  General Appearance: Alert, pleasant, appears stated age  Head: Normocephalic, without obvious abnormality  Eyes: PERRL, conjunctiva/corneas clear, EOM's intact  Ears: Normal TM's and external ear canals, both ears  Neck: Supple,without lymphadenopathy, no thyromegaly or nodules noted  Lungs: Clear to auscultation bilaterally, respirations unlabored, no wheezing or crackles  Heart: Regular rate and rhythm, no murmur   Abdomen: Soft, non-tender, no masses, no organomegaly  Extremities: Extremities with strong and symmetric pulses, no cyanosis or edema  Skin: Skin color, texture normal, no rashes or lesions  Neurologic: Grossly normal, no focal deficits  Pelvic exam: Normal external genitalia, normal-appearing cervix, moderate white discharge noted    "

## 2022-01-01 ASSESSMENT — ANXIETY QUESTIONNAIRES: GAD7 TOTAL SCORE: 0

## 2022-02-18 ENCOUNTER — HOSPITAL ENCOUNTER (EMERGENCY)
Facility: HOSPITAL | Age: 27
Discharge: HOME | End: 2022-02-18
Attending: EMERGENCY MEDICINE | Admitting: EMERGENCY MEDICINE
Payer: COMMERCIAL

## 2022-02-18 VITALS
WEIGHT: 190 LBS | SYSTOLIC BLOOD PRESSURE: 116 MMHG | HEART RATE: 105 BPM | RESPIRATION RATE: 24 BRPM | BODY MASS INDEX: 29.76 KG/M2 | OXYGEN SATURATION: 99 % | DIASTOLIC BLOOD PRESSURE: 69 MMHG | TEMPERATURE: 97.9 F

## 2022-02-18 DIAGNOSIS — R19.7 NAUSEA VOMITING AND DIARRHEA: ICD-10-CM

## 2022-02-18 DIAGNOSIS — R11.2 NAUSEA VOMITING AND DIARRHEA: ICD-10-CM

## 2022-02-18 DIAGNOSIS — K52.9 GASTROENTERITIS: ICD-10-CM

## 2022-02-18 LAB
ALBUMIN SERPL-MCNC: 4 G/DL (ref 3.5–5)
ALBUMIN UR-MCNC: 10 MG/DL
ALP SERPL-CCNC: 41 U/L (ref 45–120)
ALT SERPL W P-5'-P-CCNC: 14 U/L (ref 0–45)
ANION GAP SERPL CALCULATED.3IONS-SCNC: 11 MMOL/L (ref 5–18)
APPEARANCE UR: CLEAR
AST SERPL W P-5'-P-CCNC: 15 U/L (ref 0–40)
BACTERIA #/AREA URNS HPF: ABNORMAL /HPF
BASOPHILS # BLD AUTO: 0 10E3/UL (ref 0–0.2)
BASOPHILS NFR BLD AUTO: 0 %
BILIRUB SERPL-MCNC: 0.6 MG/DL (ref 0–1)
BILIRUB UR QL STRIP: NEGATIVE
BUN SERPL-MCNC: 13 MG/DL (ref 8–22)
CALCIUM SERPL-MCNC: 8.8 MG/DL (ref 8.5–10.5)
CHLORIDE BLD-SCNC: 107 MMOL/L (ref 98–107)
CO2 SERPL-SCNC: 23 MMOL/L (ref 22–31)
COLOR UR AUTO: ABNORMAL
CREAT SERPL-MCNC: 0.73 MG/DL (ref 0.6–1.1)
EOSINOPHIL # BLD AUTO: 0.1 10E3/UL (ref 0–0.7)
EOSINOPHIL NFR BLD AUTO: 1 %
ERYTHROCYTE [DISTWIDTH] IN BLOOD BY AUTOMATED COUNT: 12.1 % (ref 10–15)
GFR SERPL CREATININE-BSD FRML MDRD: >90 ML/MIN/1.73M2
GLUCOSE BLD-MCNC: 156 MG/DL (ref 70–125)
GLUCOSE UR STRIP-MCNC: NEGATIVE MG/DL
HCG SERPL QL: NEGATIVE
HCT VFR BLD AUTO: 48.8 % (ref 35–47)
HGB BLD-MCNC: 15.9 G/DL (ref 11.7–15.7)
HGB UR QL STRIP: ABNORMAL
IMM GRANULOCYTES # BLD: 0 10E3/UL
IMM GRANULOCYTES NFR BLD: 0 %
KETONES UR STRIP-MCNC: 150 MG/DL
LEUKOCYTE ESTERASE UR QL STRIP: NEGATIVE
LYMPHOCYTES # BLD AUTO: 0.3 10E3/UL (ref 0.8–5.3)
LYMPHOCYTES NFR BLD AUTO: 3 %
MCH RBC QN AUTO: 28.9 PG (ref 26.5–33)
MCHC RBC AUTO-ENTMCNC: 32.6 G/DL (ref 31.5–36.5)
MCV RBC AUTO: 89 FL (ref 78–100)
MONOCYTES # BLD AUTO: 0.4 10E3/UL (ref 0–1.3)
MONOCYTES NFR BLD AUTO: 4 %
MUCOUS THREADS #/AREA URNS LPF: PRESENT /LPF
NEUTROPHILS # BLD AUTO: 8.4 10E3/UL (ref 1.6–8.3)
NEUTROPHILS NFR BLD AUTO: 92 %
NITRATE UR QL: NEGATIVE
NRBC # BLD AUTO: 0 10E3/UL
NRBC BLD AUTO-RTO: 0 /100
PH UR STRIP: 6 [PH] (ref 5–7)
PLATELET # BLD AUTO: 281 10E3/UL (ref 150–450)
POTASSIUM BLD-SCNC: 4 MMOL/L (ref 3.5–5)
PROT SERPL-MCNC: 7.5 G/DL (ref 6–8)
RBC # BLD AUTO: 5.51 10E6/UL (ref 3.8–5.2)
RBC URINE: 4 /HPF
SODIUM SERPL-SCNC: 141 MMOL/L (ref 136–145)
SP GR UR STRIP: 1.03 (ref 1–1.03)
SQUAMOUS EPITHELIAL: <1 /HPF
UROBILINOGEN UR STRIP-MCNC: <2 MG/DL
WBC # BLD AUTO: 9.2 10E3/UL (ref 4–11)
WBC URINE: 1 /HPF

## 2022-02-18 PROCEDURE — 80053 COMPREHEN METABOLIC PANEL: CPT | Performed by: EMERGENCY MEDICINE

## 2022-02-18 PROCEDURE — 258N000003 HC RX IP 258 OP 636: Performed by: EMERGENCY MEDICINE

## 2022-02-18 PROCEDURE — 82040 ASSAY OF SERUM ALBUMIN: CPT | Performed by: EMERGENCY MEDICINE

## 2022-02-18 PROCEDURE — 99284 EMERGENCY DEPT VISIT MOD MDM: CPT | Mod: 25

## 2022-02-18 PROCEDURE — 96374 THER/PROPH/DIAG INJ IV PUSH: CPT

## 2022-02-18 PROCEDURE — 250N000011 HC RX IP 250 OP 636: Performed by: EMERGENCY MEDICINE

## 2022-02-18 PROCEDURE — 84703 CHORIONIC GONADOTROPIN ASSAY: CPT | Performed by: EMERGENCY MEDICINE

## 2022-02-18 PROCEDURE — 96361 HYDRATE IV INFUSION ADD-ON: CPT

## 2022-02-18 PROCEDURE — 36415 COLL VENOUS BLD VENIPUNCTURE: CPT | Performed by: EMERGENCY MEDICINE

## 2022-02-18 PROCEDURE — 81001 URINALYSIS AUTO W/SCOPE: CPT | Performed by: EMERGENCY MEDICINE

## 2022-02-18 PROCEDURE — 85025 COMPLETE CBC W/AUTO DIFF WBC: CPT | Performed by: EMERGENCY MEDICINE

## 2022-02-18 RX ORDER — ONDANSETRON 4 MG/1
4 TABLET, ORALLY DISINTEGRATING ORAL EVERY 6 HOURS PRN
Qty: 12 TABLET | Refills: 0 | Status: SHIPPED | OUTPATIENT
Start: 2022-02-18 | End: 2024-04-18

## 2022-02-18 RX ORDER — METOCLOPRAMIDE 5 MG/1
5 TABLET ORAL 3 TIMES DAILY PRN
Qty: 12 TABLET | Refills: 0 | Status: SHIPPED | OUTPATIENT
Start: 2022-02-18 | End: 2024-03-06

## 2022-02-18 RX ADMIN — PROCHLORPERAZINE EDISYLATE 10 MG: 5 INJECTION INTRAMUSCULAR; INTRAVENOUS at 18:30

## 2022-02-18 RX ADMIN — SODIUM CHLORIDE 1000 ML: 9 INJECTION, SOLUTION INTRAVENOUS at 18:28

## 2022-02-18 NOTE — ED TRIAGE NOTES
Pt reports that she has been vomiting since 10am and has not been able to keep liquids down. Pt also endorses abdominal pain that started after the vomiting.

## 2022-02-19 NOTE — ED PROVIDER NOTES
EMERGENCY DEPARTMENT ENCOUNTER      NAME: Christa Erickson  AGE: 26 year old female  YOB: 1995  MRN: [id number]  EVALUATION DATE & TIME: 2022  6:55 PM    PCP: Emily Avalos    ED PROVIDER: Garland Teixeira M.D.      Chief Complaint   Patient presents with     Vomiting       FINAL IMPRESSION:  1. Nausea vomiting and diarrhea    2. Gastroenteritis        ED COURSE & MEDICAL DECISION MAKIN year old female presents to the Emergency Department for evaluation of  nausea vomiting and diarrhea.  Patient is vitally stable and well-appearing when she arrives to the emergency department.  She has a benign abdominal exam.  Symptoms seem consistent with a viral gastrointestinal illness.  Nothing on exam or labs today to raise concern for acute intra-abdominal process like cholecystitis, appendicitis, bowel obstruction, perforation, etc.Urine analysis with some ketonuria consistent with possible dehydration/starvation, otherwise no signs of infection.  Patient was feeling well and with completely resolved nausea and vomiting after 1 dose of Compazine here and received some IV fluids.  She was able to p.o. challenge.  Was given prescriptions for Reglan and Zofran to control symptoms at home.  Discharged in stable condition.    At the conclusion of the encounter I discussed the results of all of the tests and the disposition. The questions were answered. The patient or family acknowledged understanding and was agreeable with the care plan.       MEDICATIONS GIVEN IN THE EMERGENCY:  Medications   0.9% sodium chloride BOLUS (0 mLs Intravenous Stopped 22)   prochlorperazine (COMPAZINE) injection 10 mg (10 mg Intravenous Given 22 1830)       NEW PRESCRIPTIONS STARTED AT TODAY'S ER VISIT  Discharge Medication List as of 2022  7:32 PM      START taking these medications    Details   metoclopramide (REGLAN) 5 MG tablet Take 1 tablet (5 mg) by mouth 3 times daily as needed (nausea, use  after zofran as needed), Disp-12 tablet, R-0, Local Print      ondansetron (ZOFRAN ODT) 4 MG ODT tab Take 1 tablet (4 mg) by mouth every 6 hours as needed for nausea or vomiting, Disp-12 tablet, R-0, Local Print                =================================================================    HPI    Patient information was obtained from: patient     Use of : N/A         Christa Erickson is a 26 year old female with a pertinent history of OPAL and migraines who presents to this ED for evaluation of vomiting.    Patient developed nausea with vomiting and diarrhea around 10 AM this morning (~9 hours ago). She has had emesis episodes every 3-4 minutes and 10+ episodes of diarrhea. Patient endorses abdominal pain while vomiting, but none at rest, and denies fever, hematemesis, cough, shortness of breath, dysuria, hematuria, difficulties urinating, vaginal bleeding or discharge, or any additional symptoms at this time. She has had no recent travels or medications changes.       REVIEW OF SYSTEMS   All systems reviewed and negative except as noted in HPI.    PAST MEDICAL HISTORY:  Past Medical History:   Diagnosis Date     Abdominal migraine 7/9/2015     OPAL (generalized anxiety disorder)      Migraine headache without aura        PAST SURGICAL HISTORY:  Past Surgical History:   Procedure Laterality Date     DENTAL SURGERY      wisdom     LASIK             CURRENT MEDICATIONS:    No current facility-administered medications for this encounter.     Current Outpatient Medications   Medication     metoclopramide (REGLAN) 5 MG tablet     ondansetron (ZOFRAN ODT) 4 MG ODT tab     citalopram (CELEXA) 20 MG tablet     norethindrone-ethinyl estradiol (NORTREL 0.5/35, 28,) 0.5-35 MG-MCG tablet     ondansetron (ZOFRAN) 4 MG tablet     SUMAtriptan (IMITREX) 25 MG tablet         ALLERGIES:  Allergies   Allergen Reactions     Tree Nuts [Nuts] Itching and Swelling       FAMILY HISTORY:  Family History   Problem Relation Age of  Onset     Hyperlipidemia Mother      Depression Mother      Allergic rhinitis Mother      Other - See Comments Mother         hyperglycemia     Heart Surgery Father      Alcoholism Father      Depression Father      Nephrolithiasis Maternal Grandmother      Skin Cancer Maternal Grandfather      No Known Problems Paternal Grandmother      Skin Cancer Paternal Grandfather      Alcoholism Maternal Half-Brother      No Known Problems Maternal Half-Sister      Melanoma No family hx of        SOCIAL HISTORY:   Social History     Socioeconomic History     Marital status: Single     Spouse name: Not on file     Number of children: Not on file     Years of education: Not on file     Highest education level: Not on file   Occupational History     Not on file   Tobacco Use     Smoking status: Never Smoker     Smokeless tobacco: Never Used   Substance and Sexual Activity     Alcohol use: Yes     Comment: occ      Drug use: Never     Sexual activity: Yes   Other Topics Concern     Parent/sibling w/ CABG, MI or angioplasty before 65F 55M? Not Asked   Social History Narrative    Dec 2020: , lives with finance & 2 cats      Social Determinants of Health     Financial Resource Strain: Not on file   Food Insecurity: Not on file   Transportation Needs: Not on file   Physical Activity: Not on file   Stress: Not on file   Social Connections: Not on file   Intimate Partner Violence: Not on file   Housing Stability: Not on file       VITALS:  /69   Pulse 105   Temp 97.9  F (36.6  C) (Axillary)   Resp 24   Wt 86.2 kg (190 lb)   SpO2 99%   BMI 29.76 kg/m      PHYSICAL EXAM    Constitutional: Well developed, Well nourished, NAD.  HENT: Normocephalic, Atraumatic. Neck Supple.  Eyes: EOMI, Conjunctiva normal.  Respiratory: Breathing comfortably on room air. Speaks full sentences easily. Lungs clear to ascultation.  Cardiovascular: Borderline tachycardic heart rate, Regular rhythm. No peripheral  edema.  Abdomen: Soft, nontender  Musculoskeletal: Good range of motion in all major joints. No major deformities noted.  Integument: Warm, Dry.  Neurologic: Alert & awake, Normal motor function, Normal sensory function, No focal deficits noted.   Psychiatric: Cooperative. Affect appropriate.     LAB:  All pertinent labs reviewed and interpreted.  Labs Ordered and Resulted from Time of ED Arrival to Time of ED Departure   COMPREHENSIVE METABOLIC PANEL - Abnormal       Result Value    Sodium 141      Potassium 4.0      Chloride 107      Carbon Dioxide (CO2) 23      Anion Gap 11      Urea Nitrogen 13      Creatinine 0.73      Calcium 8.8      Glucose 156 (*)     Alkaline Phosphatase 41 (*)     AST 15      ALT 14      Protein Total 7.5      Albumin 4.0      Bilirubin Total 0.6      GFR Estimate >90     ROUTINE UA WITH MICROSCOPIC REFLEX TO CULTURE - Abnormal    Color Urine Light Yellow      Appearance Urine Clear      Glucose Urine Negative      Bilirubin Urine Negative      Ketones Urine 150  (*)     Specific Gravity Urine 1.030      Blood Urine 0.2 mg/dL (*)     pH Urine 6.0      Protein Albumin Urine 10  (*)     Urobilinogen Urine <2.0      Nitrite Urine Negative      Leukocyte Esterase Urine Negative      Bacteria Urine Few (*)     Mucus Urine Present (*)     RBC Urine 4 (*)     WBC Urine 1      Squamous Epithelials Urine <1     CBC WITH PLATELETS AND DIFFERENTIAL - Abnormal    WBC Count 9.2      RBC Count 5.51 (*)     Hemoglobin 15.9 (*)     Hematocrit 48.8 (*)     MCV 89      MCH 28.9      MCHC 32.6      RDW 12.1      Platelet Count 281      % Neutrophils 92      % Lymphocytes 3      % Monocytes 4      % Eosinophils 1      % Basophils 0      % Immature Granulocytes 0      NRBCs per 100 WBC 0      Absolute Neutrophils 8.4 (*)     Absolute Lymphocytes 0.3 (*)     Absolute Monocytes 0.4      Absolute Eosinophils 0.1      Absolute Basophils 0.0      Absolute Immature Granulocytes 0.0      Absolute NRBCs 0.0     HCG  QUALITATIVE PREGNANCY - Normal    hCG Serum Qualitative Negative         RADIOLOGY:  Reviewed all pertinent imaging. Please see official radiology report.  No orders to display         I, Kateryna Valencia, am serving as a scribe to document services personally performed by Dr. Garland Teixeira, based on my observation and the provider's statements to me. I, Garland Teixeira MD attest that Kateryna Valencia is acting in a scribe capacity, has observed my performance of the services and has documented them in accordance with my direction.    Garland Teixeira M.D.  Emergency Medicine  [facility] EMERGENCY DEPARTMENT  [address] [place] [id number]  [phone]  Dept: [phone]     Garland Teixeira MD  02/18/22 2054

## 2022-02-19 NOTE — DISCHARGE INSTRUCTIONS
You were seen in the emergency department at [facility] for nausea vomiting and diarrhea.  We think your symptoms are consistent with a gastroenteritis.  We are glad that you are feeling better after some IV nausea medication and fluids here.  We would recommend eating a very bland diet at home focusing on drinking plenty of liquids and some doses throughout the day.  You can use Zofran as needed for recurrent nausea, we prescribe you the version that is dissolved under the tongue.  You can also take Reglan as needed for continued symptoms if Zofran is not sufficient.  If you have any severe abdominal pain, continued intractable vomiting, or other immediate concern we can reevaluate you as needed in the emergency department.  We expect your symptoms will be improving in the next few days, for any symptoms lingering to the weekend, please plan to follow-up with primary care.

## 2022-04-06 DIAGNOSIS — Z30.41 ENCOUNTER FOR SURVEILLANCE OF CONTRACEPTIVE PILLS: ICD-10-CM

## 2022-04-07 NOTE — TELEPHONE ENCOUNTER
Passed protocol, could give gee refill, but need to clarify pharmacy.  Left message to call back and ask to speak with an available triage nurse.    WILMA Craft RN  [facility]

## 2022-04-13 RX ORDER — NORETHINDRONE AND ETHINYL ESTRADIOL 0.5-0.035
1 KIT ORAL DAILY
Qty: 84 TABLET | Refills: 1 | Status: SHIPPED | OUTPATIENT
Start: 2022-04-13 | End: 2022-10-04

## 2022-05-27 DIAGNOSIS — F41.1 GAD (GENERALIZED ANXIETY DISORDER): ICD-10-CM

## 2022-05-29 RX ORDER — CITALOPRAM HYDROBROMIDE 20 MG/1
TABLET ORAL
Qty: 90 TABLET | Refills: 2 | Status: SHIPPED | OUTPATIENT
Start: 2022-05-29 | End: 2023-03-27

## 2022-05-29 NOTE — TELEPHONE ENCOUNTER
"Last Written Prescription Date:  11/18/21  Last Fill Quantity: 90,  # refills: 1   Last office visit provider:  3/23/22     Requested Prescriptions   Pending Prescriptions Disp Refills     citalopram (CELEXA) 20 MG tablet [Pharmacy Med Name: CITALOPRAM 20MG TABLETS] 90 tablet 1     Sig: TAKE 1 TABLET(20 MG) BY MOUTH DAILY       SSRIs Protocol Passed - 5/27/2022  9:39 AM        Passed - Recent (12 mo) or future (30 days) visit within the authorizing provider's specialty     Patient has had an office visit with the authorizing provider or a provider within the authorizing providers department within the previous 12 mos or has a future within next 30 days. See \"Patient Info\" tab in inbasket, or \"Choose Columns\" in Meds & Orders section of the refill encounter.              Passed - Medication is active on med list        Passed - Patient is age 18 or older        Passed - No active pregnancy on record        Passed - No positive pregnancy test in last 12 months             Avis Coelho 05/29/22 10:03 AM  "

## 2022-09-18 ENCOUNTER — HEALTH MAINTENANCE LETTER (OUTPATIENT)
Age: 27
End: 2022-09-18

## 2022-10-03 DIAGNOSIS — Z30.41 ENCOUNTER FOR SURVEILLANCE OF CONTRACEPTIVE PILLS: ICD-10-CM

## 2022-10-04 RX ORDER — NORETHINDRONE AND ETHINYL ESTRADIOL 0.5-0.035
1 KIT ORAL DAILY
Qty: 84 TABLET | Refills: 1 | Status: SHIPPED | OUTPATIENT
Start: 2022-10-04 | End: 2023-04-20

## 2023-01-12 ENCOUNTER — VIRTUAL VISIT (OUTPATIENT)
Dept: FAMILY MEDICINE | Facility: CLINIC | Age: 28
End: 2023-01-12
Payer: COMMERCIAL

## 2023-01-12 DIAGNOSIS — R05.1 ACUTE COUGH: Primary | ICD-10-CM

## 2023-01-12 PROCEDURE — 99213 OFFICE O/P EST LOW 20 MIN: CPT | Mod: GT | Performed by: PHYSICIAN ASSISTANT

## 2023-01-12 RX ORDER — AZITHROMYCIN 250 MG/1
TABLET, FILM COATED ORAL
Qty: 6 TABLET | Refills: 0 | Status: SHIPPED | OUTPATIENT
Start: 2023-01-12 | End: 2023-01-17

## 2023-01-12 NOTE — PROGRESS NOTES
Christa is a 27 year old who is being evaluated via a billable video visit.      How would you like to obtain your AVS? MyChart  If the video visit is dropped, the invitation should be resent by: Text to cell phone: [phone]  Will anyone else be joining your video visit? No          Assessment & Plan     1. Acute/subacute cough  Given she has had the symptoms for 3 to 4 weeks without improvement, prescription sent for Z-Luis Antonio to see if this helps resolve her symptoms.  Delsym is working fairly well for cough medicine.  Therefore, defer prescription for Cheratussin at this time.  Discussed other symptomatic treatment.  Patient is unable to take cough drops because they taste bad.  Follow-up in 1 week if no better, sooner if worsening or other concerns.  - azithromycin (ZITHROMAX) 250 MG tablet; Take 2 tablets (500 mg) by mouth daily for 1 day, THEN 1 tablet (250 mg) daily for 4 days.  Dispense: 6 tablet; Refill: 0      Subjective   Christa is a 27 year old, presenting for the following health issues:  Cough    Reason for visit:  Cough  Symptom onset:  3-4 weeks ago  Symptoms include:  Consistent cough that had not gone away over time. Delsym has been less effective than previously.  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    Physical Exam   GENERAL: Healthy, alert and no distress  EYES: Eyes grossly normal to inspection.  No discharge or erythema, or obvious scleral/conjunctival abnormalities.  RESP: No audible wheeze, cough, or visible cyanosis.  No visible retractions or increased work of breathing.    SKIN: Visible skin clear. No significant rash,  abnormal pigmentation or lesions.  NEURO: Cranial nerves grossly intact.  Mentation and speech appropriate for age.  PSYCH: Mentation appears normal, affect normal/bright, judgement and insight intact, normal speech and appearance well-groomed.            Video-Visit Details    Type of service:  Video Visit   Video Start Time: 4:58 PM  Video End Time:5:04 PM    Originating Location (pt. Location): Home    Distant Location (provider location):  On-site  Platform used for Video Visit: Ce

## 2023-04-20 ENCOUNTER — VIRTUAL VISIT (OUTPATIENT)
Dept: FAMILY MEDICINE | Facility: CLINIC | Age: 28
End: 2023-04-20
Payer: COMMERCIAL

## 2023-04-20 DIAGNOSIS — Z30.41 ENCOUNTER FOR SURVEILLANCE OF CONTRACEPTIVE PILLS: ICD-10-CM

## 2023-04-20 PROCEDURE — 99213 OFFICE O/P EST LOW 20 MIN: CPT | Mod: VID | Performed by: STUDENT IN AN ORGANIZED HEALTH CARE EDUCATION/TRAINING PROGRAM

## 2023-04-20 RX ORDER — NORETHINDRONE AND ETHINYL ESTRADIOL 0.5-0.035
1 KIT ORAL DAILY
Qty: 84 TABLET | Refills: 3 | Status: SHIPPED | OUTPATIENT
Start: 2023-04-20 | End: 2024-03-06

## 2023-04-20 NOTE — PROGRESS NOTES
Christa is a 27 year old who is being evaluated via a billable video visit.      How would you like to obtain your AVS? MyChart  If the video visit is dropped, the invitation should be resent by: Text to cell phone: [phone]  Will anyone else be joining your video visit? No          Assessment & Plan     Encounter for surveillance of contraceptive pills  Is not had a break in her OCPs, denies any contraindications to OCPs.  Refill for 1 year supply.  - norethindrone-ethinyl estradiol (NORTREL 0.5/35, 28,) 0.5-35 MG-MCG tablet  Dispense: 84 tablet; Refill: 3      Review of external notes as documented elsewhere in note        Davina Madrid MD  [facility]   Christa is a 27 year old, presenting for the following health issues:  Refill Request        4/20/2023     4:49 PM   Additional Questions   Roomed by Lizabeth     History of Present Illness       Reason for visit:  Refill birth control    She eats 2-3 servings of fruits and vegetables daily.She consumes 0 sweetened beverage(s) daily.She exercises with enough effort to increase her heart rate 20 to 29 minutes per day.  She exercises with enough effort to increase her heart rate 3 or less days per week. She is missing 1 dose(s) of medications per week.  She is not taking prescribed medications regularly due to remembering to take.       Birth control -would like refill of her OCPs  Denies any contraindication, including heart disease, diabetes, liver disease, migraine with aura, history of blood clots.      Review of Systems   Constitutional: Negative for fever and chills.   Respiratory: Negative for cough or shortness of breath.    Cardiovascular: Negative for chest pain or chest pressure.   Gastrointestinal: Negative for nausea, vomiting, diarrhea or abdominal pain.        Objective           Vitals:  No vitals were obtained today due to virtual visit.    Physical Exam   GENERAL: Healthy, alert and no distress  EYES: Eyes grossly  normal to inspection.  No discharge or erythema, or obvious scleral/conjunctival abnormalities.  RESP: No audible wheeze, cough, or visible cyanosis.  No visible retractions or increased work of breathing.    SKIN: Visible skin clear. No significant rash, abnormal pigmentation or lesions.  NEURO: Cranial nerves grossly intact.  Mentation and speech appropriate for age.  PSYCH: Mentation appears normal, affect normal/bright, judgement and insight intact, normal speech and appearance well-groomed.        Video-Visit Details    Type of service:  Video Visit   Video Start Time: 5:28 PM  Video End Time: 5:30 PM    Originating Location (pt. Location): Home  Distant Location (provider location):  On-site  Platform used for Video Visit: Ce

## 2023-05-07 ENCOUNTER — HEALTH MAINTENANCE LETTER (OUTPATIENT)
Age: 28
End: 2023-05-07

## 2023-05-17 ENCOUNTER — E-VISIT (OUTPATIENT)
Dept: URGENT CARE | Facility: CLINIC | Age: 28
End: 2023-05-17
Payer: COMMERCIAL

## 2023-05-17 DIAGNOSIS — R06.2 WHEEZING: Primary | ICD-10-CM

## 2023-05-17 PROCEDURE — 99207 PR NON-BILLABLE SERV PER CHARTING: CPT | Performed by: NURSE PRACTITIONER

## 2023-05-17 NOTE — PATIENT INSTRUCTIONS
Dear Christa Erickson,    We are sorry you are not feeling well. Based on the responses you provided, it is recommended that you be seen in-person in urgent care so we can better evaluate your symptoms. Please click here to find the nearest urgent care location to you.   You will not be charged for this Visit. Thank you for trusting us with your care.    NOELLE Figueroa CNP

## 2023-05-30 ASSESSMENT — ENCOUNTER SYMPTOMS
ARTHRALGIAS: 0
HEMATOCHEZIA: 0
SHORTNESS OF BREATH: 0
WEAKNESS: 0
HEARTBURN: 0
ABDOMINAL PAIN: 0
FEVER: 0
PARESTHESIAS: 0
JOINT SWELLING: 0
DIARRHEA: 0
DIZZINESS: 0
NERVOUS/ANXIOUS: 0
BREAST MASS: 0
HEADACHES: 0
CHILLS: 0
CONSTIPATION: 0
DYSURIA: 0
PALPITATIONS: 0
FREQUENCY: 0
NAUSEA: 0
SORE THROAT: 0
HEMATURIA: 0
COUGH: 1
MYALGIAS: 0
EYE PAIN: 0

## 2023-05-30 NOTE — PROGRESS NOTES
Assessment/Plan:   Christa is a 27 year old female here for physical     Routine adult health maintenance  Physical completed today.  No bloodwork indicated.  Pap smear up-to-date.  Up-to-date with immunizations. Discussed trial of daily flonase for postCOVID cough (suspect element of postnasal drip).    Encounter for preconception consultation  Patient and  planning pregnancy.  We discussed how to stop OCPs, track periods, start prenatal vitamins, ovulation predictor kit if needed; discussed patient can return to me for prenatal care and delivery if desired.     I have had an Advance Directives discussion with the patient.  The following high BMI interventions were performed this visit: encouragement to exercise and lifestyle education regarding diet    Follow up: 1 year for physical, sooner as needed    Emily Avalos MD  [facility]      Subjective:     Christa Erickson is a 27 year old female who presents for an annual exam.     Answers for HPI/ROS submitted by the patient on 5/31/2023  If you checked off any problems, how difficult have these problems made it for you to do your work, take care of things at home, or get along with other people?: Not difficult at all  PHQ9 TOTAL SCORE: 2  OPAL 7 TOTAL SCORE: 0  Frequency of exercise:: 2-3 days/week  Getting at least 3 servings of Calcium per day:: Yes  Diet:: Regular (no restrictions)  Taking medications regularly:: Yes  Medication side effects:: None  Bi-annual eye exam:: NO  Dental care twice a year:: Yes  Sleep apnea or symptoms of sleep apnea:: None  abdominal pain: No  Blood in stool: No  Blood in urine: No  chest pain: No  chills: No  congestion: No  constipation: No  cough: Yes  diarrhea: No  dizziness: No  ear pain: No  eye pain: No  nervous/anxious: No  fever: No  frequency: No  genital sores: No  headaches: No  hearing loss: No  heartburn: No  arthralgias: No  joint swelling: No  peripheral edema: No  mood changes: No  myalgias:  No  nausea: No  dysuria: No  palpitations: No  Skin sensation changes: No  sore throat: No  urgency: No  rash: Yes  shortness of breath: No  visual disturbance: No  weakness: No  pelvic pain: No  vaginal bleeding: No  vaginal discharge: No  tenderness: No  breast mass: No  breast discharge: No  Additional concerns today:: Yes  Duration of exercise:: 15-30 minutes    Family planning - thinking about starting to try end of summer - has been on OCPs for years    Cough since covid infection in March - only at night before bed or overnight wakes her up, coughing fit with wheezing, not every night but almost - doesn't necessarily notice PND/congestion - no issue with heartburn    Health Maintenance reviewed:  Lipid Profile: no  Glucose Screen: no  Colonoscopy: no  Mammogram: no    Gynecologic History  Patient's last menstrual period was 05/02/2023 (exact date).  Contraception: oral contraceptive  Last Pap: 2021. Results were: nml - due 3 years    Immunization History   Administered Date(s) Administered     COVID-19 Bivalent 12+ (Pfizer) 10/03/2022     COVID-19 MONOVALENT 12+ (Pfizer) 11/01/2021     COVID-19 Monovalent 18+ (Moderna) 02/26/2021, 03/26/2021     DTAP (<7y) 08/18/1995, 09/02/1999     Flu, Unspecified 12/01/2006     HEPATITIS A (PEDS 12M-18Y) 08/22/2012     HPV Quadrivalent 08/22/2012     HPV9 07/09/2015, 08/17/2017     Hepatits B (Peds <19Y) 1993, 1993, 07/21/1995     Hib, Unspecified 01/21/1994, 04/22/1994, 06/17/1994     Historic Hib Hib-titer 01/21/1994, 04/22/1994, 06/17/1994     Historical DTP/aP 01/21/1994, 04/22/1994, 06/17/1994     Influenza (IIV3) PF 12/01/2006, 11/03/2015     Influenza Vaccine >6 months (Alfuria,Fluzone) 11/27/2019, 11/11/2021     Influenza Vaccine Im 4yrs+ 4 Valent CCIIV4 09/15/2022     Influenza,INJ,MDCK,PF,Quad >6mo(Flucelvax) 11/27/2019, 10/31/2020     MMR 07/21/1995, 09/02/1999     Meningococcal ACWY (Menactra ) 11/19/2008, 08/22/2012     Meningococcal ACWY (Menveo )  11/19/2008, 08/22/2012     OPV, unspecified 01/21/1994, 04/22/1994, 06/17/1994, 08/18/1995     TDAP (Adacel,Boostrix) 12/31/2021     TDAP Vaccine (Adacel) 11/19/2008     Immunization status: up to date.    Current Outpatient Medications   Medication Sig Dispense Refill     citalopram (CELEXA) 20 MG tablet TAKE 1 TABLET(20 MG) BY MOUTH DAILY 90 tablet 0     fexofenadine (ALLEGRA) 180 MG tablet Take 180 mg by mouth daily       metoclopramide (REGLAN) 5 MG tablet Take 1 tablet (5 mg) by mouth 3 times daily as needed (nausea, use after zofran as needed) 12 tablet 0     norethindrone-ethinyl estradiol (NORTREL 0.5/35, 28,) 0.5-35 MG-MCG tablet Take 1 tablet by mouth daily 84 tablet 3     ondansetron (ZOFRAN ODT) 4 MG ODT tab Take 1 tablet (4 mg) by mouth every 6 hours as needed for nausea or vomiting 12 tablet 0     SUMAtriptan (IMITREX) 25 MG tablet 25 to 50 mg once at onset of headache, may repeat a dose after 2 hours, 200 mg per 24 hours. 9 tablet 3     Past Medical History:   Diagnosis Date     Abdominal migraine 7/9/2015     OPAL (generalized anxiety disorder)      Migraine headache without aura      Past Surgical History:   Procedure Laterality Date     DENTAL SURGERY      wisdom     LASIK       Tree nuts [nuts]  Family History   Problem Relation Age of Onset     Hyperlipidemia Mother      Depression Mother      Allergic rhinitis Mother      Other - See Comments Mother         hyperglycemia     Heart Surgery Father      Alcoholism Father      Depression Father      Nephrolithiasis Maternal Grandmother      Skin Cancer Maternal Grandfather      No Known Problems Paternal Grandmother      Skin Cancer Paternal Grandfather      Alcoholism Maternal Half-Brother      No Known Problems Maternal Half-Sister      Melanoma No family hx of      Social History     Socioeconomic History     Marital status:      Spouse name: Not on file     Number of children: Not on file     Years of education: Not on file     Highest  "education level: Not on file   Occupational History     Not on file   Tobacco Use     Smoking status: Never     Passive exposure: Never     Smokeless tobacco: Never   Vaping Use     Vaping status: Not on file   Substance and Sexual Activity     Alcohol use: Yes     Comment: occ      Drug use: Never     Sexual activity: Yes   Other Topics Concern     Parent/sibling w/ CABG, MI or angioplasty before 65F 55M? Not Asked   Social History Narrative    Dec 2020: , lives with finance & 2 cats      Social Determinants of Health     Financial Resource Strain: Not on file   Food Insecurity: Not on file   Transportation Needs: Not on file   Physical Activity: Not on file   Stress: Not on file   Social Connections: Not on file   Intimate Partner Violence: Not on file   Housing Stability: Not on file       Review of Systems negative unless noted    Objective:        Vitals:    05/31/23 1620   BP: 112/82   Pulse: 83   Resp: 20   Temp: 97.1  F (36.2  C)   TempSrc: Temporal   SpO2: 98%   Weight: 88.5 kg (195 lb 1.6 oz)   Height: 1.708 m (5' 7.25\")   PainSc: No Pain (0)     Body mass index is 30.33 kg/m .    Physical Exam:  General Appearance: Alert, pleasant, appears stated age  Head: Normocephalic, without obvious abnormality  Eyes: PERRL, conjunctiva/corneas clear, EOM's intact  Ears: Normal TM's and external ear canals, both ears  Nose: Nares normal, septum midline,mucosa normal, no drainage  Throat: Lips, mucosa, and tongue normal; teeth and gums normal; oropharynx is clear  Neck: Supple,without lymphadenopathy, no thyromegaly or nodules noted  Lungs: Clear to auscultation bilaterally, respirations unlabored, no wheezing or crackles  Heart: Regular rate and rhythm, no murmur   Abdomen: Soft, non-tender, no masses, no organomegaly  Extremities: Extremities with strong and symmetric pulses, no cyanosis or edema  Skin: Skin color, texture normal, no rashes or lesions  Neurologic: Grossly normal, no focal " deficits

## 2023-05-31 ENCOUNTER — OFFICE VISIT (OUTPATIENT)
Dept: FAMILY MEDICINE | Facility: CLINIC | Age: 28
End: 2023-05-31
Payer: COMMERCIAL

## 2023-05-31 VITALS
TEMPERATURE: 97.1 F | OXYGEN SATURATION: 98 % | SYSTOLIC BLOOD PRESSURE: 112 MMHG | WEIGHT: 195.1 LBS | BODY MASS INDEX: 30.62 KG/M2 | HEIGHT: 67 IN | HEART RATE: 83 BPM | RESPIRATION RATE: 20 BRPM | DIASTOLIC BLOOD PRESSURE: 82 MMHG

## 2023-05-31 DIAGNOSIS — Z00.00 ROUTINE ADULT HEALTH MAINTENANCE: Primary | ICD-10-CM

## 2023-05-31 DIAGNOSIS — Z31.69 ENCOUNTER FOR PRECONCEPTION CONSULTATION: ICD-10-CM

## 2023-05-31 PROCEDURE — 99395 PREV VISIT EST AGE 18-39: CPT | Performed by: FAMILY MEDICINE

## 2023-05-31 RX ORDER — FEXOFENADINE HCL 180 MG/1
180 TABLET ORAL DAILY
COMMUNITY
End: 2024-03-12

## 2023-05-31 ASSESSMENT — ANXIETY QUESTIONNAIRES
GAD7 TOTAL SCORE: 0
GAD7 TOTAL SCORE: 0
3. WORRYING TOO MUCH ABOUT DIFFERENT THINGS: NOT AT ALL
4. TROUBLE RELAXING: NOT AT ALL
2. NOT BEING ABLE TO STOP OR CONTROL WORRYING: NOT AT ALL
IF YOU CHECKED OFF ANY PROBLEMS ON THIS QUESTIONNAIRE, HOW DIFFICULT HAVE THESE PROBLEMS MADE IT FOR YOU TO DO YOUR WORK, TAKE CARE OF THINGS AT HOME, OR GET ALONG WITH OTHER PEOPLE: NOT DIFFICULT AT ALL
1. FEELING NERVOUS, ANXIOUS, OR ON EDGE: NOT AT ALL
6. BECOMING EASILY ANNOYED OR IRRITABLE: NOT AT ALL
7. FEELING AFRAID AS IF SOMETHING AWFUL MIGHT HAPPEN: NOT AT ALL
GAD7 TOTAL SCORE: 0
5. BEING SO RESTLESS THAT IT IS HARD TO SIT STILL: NOT AT ALL
8. IF YOU CHECKED OFF ANY PROBLEMS, HOW DIFFICULT HAVE THESE MADE IT FOR YOU TO DO YOUR WORK, TAKE CARE OF THINGS AT HOME, OR GET ALONG WITH OTHER PEOPLE?: NOT DIFFICULT AT ALL
7. FEELING AFRAID AS IF SOMETHING AWFUL MIGHT HAPPEN: NOT AT ALL

## 2023-05-31 ASSESSMENT — PATIENT HEALTH QUESTIONNAIRE - PHQ9
SUM OF ALL RESPONSES TO PHQ QUESTIONS 1-9: 2
10. IF YOU CHECKED OFF ANY PROBLEMS, HOW DIFFICULT HAVE THESE PROBLEMS MADE IT FOR YOU TO DO YOUR WORK, TAKE CARE OF THINGS AT HOME, OR GET ALONG WITH OTHER PEOPLE: NOT DIFFICULT AT ALL
SUM OF ALL RESPONSES TO PHQ QUESTIONS 1-9: 2

## 2023-05-31 ASSESSMENT — PAIN SCALES - GENERAL: PAINLEVEL: NO PAIN (0)

## 2023-05-31 NOTE — PATIENT INSTRUCTIONS
Start using flonase nasal spray daily for 2-4 weeks     Stop OCPs when comfortable doing so - can be helpful to stop a little before actively trying for pregnancy to see what your periods do  Start prenatal vitamin now - target (nature made) is fine, to have DHA/fish oil  Track periods - Harvey trey is free  If needed can use ovulation predictor kit - start around day 7 of cycle, pee on stick every morning, use clearblue smiley face brand, pee on stick until you get a smiley face    If get pregnant and nauseous - use zofran first (little safer than reglan)  Limit/avoid imitrex

## 2023-06-21 DIAGNOSIS — F41.1 GAD (GENERALIZED ANXIETY DISORDER): ICD-10-CM

## 2023-06-21 RX ORDER — CITALOPRAM HYDROBROMIDE 20 MG/1
TABLET ORAL
Qty: 90 TABLET | Refills: 3 | Status: SHIPPED | OUTPATIENT
Start: 2023-06-21

## 2023-06-21 NOTE — TELEPHONE ENCOUNTER
"  Last Written Prescription Date:  3/27/2023  Last Fill Quantity: 90,  # refills: 0   Last office visit provider:  5/31/2023     Requested Prescriptions   Pending Prescriptions Disp Refills     citalopram (CELEXA) 20 MG tablet [Pharmacy Med Name: CITALOPRAM 20MG TABLETS] 90 tablet 0     Sig: TAKE 1 TABLET(20 MG) BY MOUTH DAILY       SSRIs Protocol Failed - 6/21/2023  1:45 PM        Failed - Recent (12 mo) or future (30 days) visit within the authorizing provider's specialty     Patient has had an office visit with the authorizing provider or a provider within the authorizing providers department within the previous 12 mos or has a future within next 30 days. See \"Patient Info\" tab in inbasket, or \"Choose Columns\" in Meds & Orders section of the refill encounter.              Passed - Medication is active on med list        Passed - Patient is age 18 or older        Passed - No active pregnancy on record        Passed - No positive pregnancy test in last 12 months             Abida Ernst RN 06/21/23 5:47 PM  "

## 2024-03-06 ENCOUNTER — E-VISIT (OUTPATIENT)
Dept: FAMILY MEDICINE | Facility: CLINIC | Age: 29
End: 2024-03-06
Payer: COMMERCIAL

## 2024-03-06 ENCOUNTER — VIRTUAL VISIT (OUTPATIENT)
Dept: INTERNAL MEDICINE | Facility: CLINIC | Age: 29
End: 2024-03-06
Payer: COMMERCIAL

## 2024-03-06 DIAGNOSIS — F41.1 GAD (GENERALIZED ANXIETY DISORDER): ICD-10-CM

## 2024-03-06 DIAGNOSIS — Z33.1 PREGNANT STATE, INCIDENTAL: ICD-10-CM

## 2024-03-06 DIAGNOSIS — J45.20 MILD INTERMITTENT ASTHMA WITHOUT COMPLICATION: ICD-10-CM

## 2024-03-06 DIAGNOSIS — R05.1 ACUTE COUGH: Primary | ICD-10-CM

## 2024-03-06 DIAGNOSIS — J98.8 VIRAL RESPIRATORY INFECTION: Primary | ICD-10-CM

## 2024-03-06 DIAGNOSIS — B97.89 VIRAL RESPIRATORY INFECTION: Primary | ICD-10-CM

## 2024-03-06 PROCEDURE — 99214 OFFICE O/P EST MOD 30 MIN: CPT | Mod: 95 | Performed by: INTERNAL MEDICINE

## 2024-03-06 PROCEDURE — 99207 PR NON-BILLABLE SERV PER CHARTING: CPT | Performed by: FAMILY MEDICINE

## 2024-03-06 RX ORDER — CODEINE PHOSPHATE AND GUAIFENESIN 10; 100 MG/5ML; MG/5ML
1-2 SOLUTION ORAL EVERY 6 HOURS PRN
Qty: 240 ML | Refills: 0 | Status: SHIPPED | OUTPATIENT
Start: 2024-03-06 | End: 2024-03-11

## 2024-03-06 RX ORDER — BECLOMETHASONE DIPROPIONATE HFA 40 UG/1
AEROSOL, METERED RESPIRATORY (INHALATION)
COMMUNITY
Start: 2024-03-06

## 2024-03-06 RX ORDER — ALBUTEROL SULFATE 90 UG/1
2 AEROSOL, METERED RESPIRATORY (INHALATION) EVERY 4 HOURS PRN
COMMUNITY
Start: 2024-03-06

## 2024-03-06 RX ORDER — BECLOMETHASONE DIPROPIONATE HFA 40 UG/1
AEROSOL, METERED RESPIRATORY (INHALATION)
COMMUNITY
Start: 2023-07-19 | End: 2024-03-06

## 2024-03-06 RX ORDER — AMOXICILLIN AND CLAVULANATE POTASSIUM 500; 125 MG/1; MG/1
1 TABLET, FILM COATED ORAL 2 TIMES DAILY
Qty: 14 TABLET | Refills: 0 | Status: SHIPPED | OUTPATIENT
Start: 2024-03-06 | End: 2024-03-12

## 2024-03-06 RX ORDER — BENZONATATE 200 MG/1
200 CAPSULE ORAL 3 TIMES DAILY PRN
Qty: 20 CAPSULE | Refills: 1 | Status: SHIPPED | OUTPATIENT
Start: 2024-03-06 | End: 2024-03-11

## 2024-03-06 RX ORDER — GUAIFENESIN 600 MG/1
600 TABLET, EXTENDED RELEASE ORAL 2 TIMES DAILY
Qty: 60 TABLET | Refills: 2 | Status: SHIPPED | OUTPATIENT
Start: 2024-03-06 | End: 2024-03-11

## 2024-03-06 RX ORDER — PREDNISONE 20 MG/1
20 TABLET ORAL EVERY MORNING
Qty: 20 TABLET | Refills: 1 | Status: SHIPPED | OUTPATIENT
Start: 2024-03-06 | End: 2024-03-11

## 2024-03-06 RX ORDER — ALBUTEROL SULFATE 90 UG/1
2 AEROSOL, METERED RESPIRATORY (INHALATION) EVERY 4 HOURS PRN
COMMUNITY
Start: 2023-07-18 | End: 2024-03-06

## 2024-03-06 RX ORDER — EPINEPHRINE 0.3 MG/.3ML
INJECTION SUBCUTANEOUS
COMMUNITY
Start: 2023-07-18 | End: 2024-05-21

## 2024-03-06 ASSESSMENT — ENCOUNTER SYMPTOMS: COUGH: 1

## 2024-03-06 NOTE — PROGRESS NOTES
Christa is a 28 year old female contacting the clinic today via video, who will use the platform: [employer] for the visit.  Phone # for [employer], or if Amwell drops:   Telephone Information:   Mobile [phone]          ASSESSMENT and PLAN:  1. Viral respiratory infection  Duration suggests bacterial but significant allergies suggest significant allergic component.  Pregnancy limits some choices.  Would like to treat with prednisone, inhalers, and Allegra.  If no improvement 1 week of antibiotics  - guaiFENesin (MUCINEX) 600 MG 12 hr tablet; Take 1 tablet (600 mg) by mouth 2 times daily  Dispense: 60 tablet; Refill: 2  - benzonatate (TESSALON) 200 MG capsule; Take 1 capsule (200 mg) by mouth 3 times daily as needed for cough  Dispense: 20 capsule; Refill: 1  - guaiFENesin-codeine (ROBITUSSIN AC) 100-10 MG/5ML solution; Take 5-10 mLs by mouth every 6 hours as needed for cough  Dispense: 240 mL; Refill: 0  - amoxicillin-clavulanate (AUGMENTIN) 500-125 MG tablet; Take 1 tablet by mouth 2 times daily for 7 days  Dispense: 14 tablet; Refill: 0    2. Mild intermittent asthma without complication  Well in between illnesses.  Encouraged to use inhalers immediately upon viral illness  - VENTOLIN  (90 Base) MCG/ACT inhaler; Inhale 2 puffs into the lungs every 4 hours as needed for wheezing  - QVAR REDIHALER 40 MCG/ACT inhaler; 1 puff twice daily  - predniSONE (DELTASONE) 20 MG tablet; Take 1 tablet (20 mg) by mouth every morning for 4 days And repeat as needed for future attacks  Dispense: 20 tablet; Refill: 1  - guaiFENesin (MUCINEX) 600 MG 12 hr tablet; Take 1 tablet (600 mg) by mouth 2 times daily  Dispense: 60 tablet; Refill: 2  - guaiFENesin-codeine (ROBITUSSIN AC) 100-10 MG/5ML solution; Take 5-10 mLs by mouth every 6 hours as needed for cough  Dispense: 240 mL; Refill: 0    3. OPAL (generalized anxiety disorder)  Discussed possible role of citalopram as Accelerator  - VENTOLIN  (90 Base) MCG/ACT inhaler;  Inhale 2 puffs into the lungs every 4 hours as needed for wheezing  - QVAR REDIHALER 40 MCG/ACT inhaler; 1 puff twice daily    4. Pregnant state, incidental  Concern regarding prednisone, codeine and benzonatate with early pregnancy.  Other medicines should be safe       Patient Instructions   Resume inhalers Qvar 1 puff twice daily    Albuterol inhaler as needed    Mucinex 600 mg twice daily    Benzonatate 200 mg every 8 hours as needed    Fexofenadine as needed    Robitussin with codeine as needed    Prednisone 20 mg daily for 4 days at onset of viral attack    If no improvement Augmentin 500 mg twice daily for 7 days    Pregnancy risk reviewed with above medicines.  Advised to hold prednisone, codeine.  Inhalers, Mucinex, fexofenadine and Augmentin safety reassured.  Unclear benzonatate    MyChart communication and information sent            Return if symptoms worsen or fail to improve, for using a video visit.       CHIEF COMPLAINT:  Chief Complaint   Patient presents with    Cough     Persistent cough for about a week- Productive cough when laying flat, otherwise it is a dry hacking cough  Just found out she is pregnant- Wondering what OTC medications are best to take   No congestion at this time.        HISTORY OF PRESENT ILLNESS:  Crhista is a 28 year old female contacting the clinic today via video for complaints of a respiratory infection.  First day of illness February 27, day 0 which makes today day 8.  Complains of posterior nasal drainage, hoarse sitting voice, and frequent cough.  Difficulty falling asleep due to the cough.  Works as a teacher.  Has received antibiotics with [facility] 1 year ago.    Has allergy to tree nuts and carries an EpiPen.  Has illness induced asthma with Qvar and albuterol inhalers but has not used them during this illness    Found out that she was pregnant just this morning with her first.  Does not yet have an obstetrician    History of Present Illness       Reason for visit:   "Cough  Symptom onset:  3-7 days ago  Symptoms include:  Cough that is sometimes productive  Symptom intensity:  Moderate  Symptom progression:  Staying the same  Had these symptoms before:  Yes  Has tried/received treatment for these symptoms:  No    She eats 2-3 servings of fruits and vegetables daily.She consumes 0 sweetened beverage(s) daily.She exercises with enough effort to increase her heart rate 20 to 29 minutes per day.  She exercises with enough effort to increase her heart rate 4 days per week.   She is taking medications regularly.      REVIEW OF SYSTEMS:   Stable anxiety on citalopram which she has taken for years    Today's PHQ-2 Score:       3/5/2024     9:24 PM   PHQ-2 ( 1999 Pfizer)   Q1: Little interest or pleasure in doing things 0   Q2: Feeling down, depressed or hopeless 0   PHQ-2 Score 0   Q1: Little interest or pleasure in doing things Not at all   Q2: Feeling down, depressed or hopeless Not at all   PHQ-2 Score 0       PFSH:  Social History     Social History Narrative    Dec 2020: , lives with finance & 2 cats        TOBACCO USE:  History   Smoking Status    Never   Smokeless Tobacco    Never       VITALS:  There were no vitals filed for this visit.  LMP 02/04/2024  Estimated body mass index is 30.33 kg/m  as calculated from the following:    Height as of 5/31/23: 1.708 m (5' 7.25\").    Weight as of 5/31/23: 88.5 kg (195 lb 1.6 oz).    PHYSICAL EXAM:  (observations via Video)  Alert and oriented with occasional cough    MEDICATIONS:   Current Outpatient Medications   Medication Sig Dispense Refill    amoxicillin-clavulanate (AUGMENTIN) 500-125 MG tablet Take 1 tablet by mouth 2 times daily for 7 days 14 tablet 0    benzonatate (TESSALON) 200 MG capsule Take 1 capsule (200 mg) by mouth 3 times daily as needed for cough 20 capsule 1    citalopram (CELEXA) 20 MG tablet TAKE 1 TABLET(20 MG) BY MOUTH DAILY 90 tablet 3    EPINEPHrine (ANY BX GENERIC EQUIV) 0.3 MG/0.3ML " "injection 2-pack       guaiFENesin (MUCINEX) 600 MG 12 hr tablet Take 1 tablet (600 mg) by mouth 2 times daily 60 tablet 2    guaiFENesin-codeine (ROBITUSSIN AC) 100-10 MG/5ML solution Take 5-10 mLs by mouth every 6 hours as needed for cough 240 mL 0    ondansetron (ZOFRAN ODT) 4 MG ODT tab Take 1 tablet (4 mg) by mouth every 6 hours as needed for nausea or vomiting 12 tablet 0    predniSONE (DELTASONE) 20 MG tablet Take 1 tablet (20 mg) by mouth every morning for 4 days And repeat as needed for future attacks 20 tablet 1    QVAR REDIHALER 40 MCG/ACT inhaler 1 puff twice daily      SUMAtriptan (IMITREX) 25 MG tablet 25 to 50 mg once at onset of headache, may repeat a dose after 2 hours, 200 mg per 24 hours. 9 tablet 3    VENTOLIN  (90 Base) MCG/ACT inhaler Inhale 2 puffs into the lungs every 4 hours as needed for wheezing      fexofenadine (ALLEGRA) 180 MG tablet Take 180 mg by mouth daily         Outside Notes summarized:   Labs, x-rays, cardiology, GI tests reviewed:   No results for input(s): \"HGB\", \"WBC\", \"NA\", \"POTASSIUM\", \"CR\", \"A1C\", \"PSA\", \"URIC\", \"B12\", \"TSH\", \"VITDT\", \"SED\", \"CRPI\" in the last 15711 hours.  No results found for: \"TNLNS70SER\"  No results found for: \"CHOL\"  New orders: No orders of the defined types were placed in this encounter.      Independent review of:         3/6/2024    12:02 PM   Additional Questions   Roomed by BOBBI Boo       Video-Visit Details  Type of service:  Video Visit  Patient has given verbal consent to a Video visit?  Yes  How would you like to obtain your AVS?  MyChart  Will anyone else be joining your video visit, giving supplemental history? No    Originating location (pt location): Work    Distant Location (provider location):  Off-site    Video Start Time: 3:02 PM  Video End time:   3:37 PM  Face to face plus orders: 35 minutes  Documentation time:  3 minutes     The visit lasted a total of 38 minutes    Mike Cortez MD  [facility]. " [address]

## 2024-03-06 NOTE — PATIENT INSTRUCTIONS
Resume inhalers Qvar 1 puff twice daily    Albuterol inhaler as needed    Mucinex 600 mg twice daily    Benzonatate 200 mg every 8 hours as needed    Fexofenadine as needed    Robitussin with codeine as needed    Prednisone 20 mg daily for 4 days at onset of viral attack    If no improvement Augmentin 500 mg twice daily for 7 days    Pregnancy risk reviewed with above medicines.  Advised to hold prednisone, codeine.  Inhalers, Mucinex, fexofenadine and Augmentin safety reassured.  Unclear benzonatate    MyChart communication and information sent

## 2024-03-06 NOTE — PROGRESS NOTES
"Christa is a 28 year old who is being evaluated via a billable video visit.      How would you like to obtain your AVS? MyChart  If the video visit is dropped, the invitation should be resent by: Send to e-mail at: [email or web address]  Will anyone else be joining your video visit? No  {If patient encounters technical issues they should call [phone] :563563}        {PROVIDER CHARTING PREFERENCE:759361}    Subjective   Christa is a 28 year old, presenting for the following health issues:  Cough (Persistent cough for about a week- Productive cough when laying flat, otherwise it is a dry hacking cough/Just found out she is pregnant- Wondering what OTC medications are best to take /No congestion at this time. )        3/6/2024    12:02 PM   Additional Questions   Roomed by BOBBI Boo     Cough    History of Present Illness       Reason for visit:  Cough  Symptom onset:  3-7 days ago  Symptoms include:  Cough that is sometimes productive  Symptom intensity:  Moderate  Symptom progression:  Staying the same  Had these symptoms before:  Yes  Has tried/received treatment for these symptoms:  No    She eats 2-3 servings of fruits and vegetables daily.She consumes 0 sweetened beverage(s) daily.She exercises with enough effort to increase her heart rate 20 to 29 minutes per day.  She exercises with enough effort to increase her heart rate 4 days per week.   She is taking medications regularly.       {SUPERLIST (Optional):185640}  {additonal problems for provider to add (Optional):690961}    {ROS Picklists (Optional):295388}      Objective    Vitals - Patient Reported  Pain Score: No Pain (0)        Physical Exam   {video visit exam brief selected:880444}    {Diagnostic Test Results (Optional):053664}      Video-Visit Details    Type of service:  Video Visit     Originating Location (pt. Location): {video visit patient location:414583::\"Home\"}  {PROVIDER LOCATION On-site should be selected for visits conducted from your clinic " "location or adjoining [facility] hospital, academic office, or other nearby [facility] building. Off-site should be selected for all other provider locations, including home:294274}  Distant Location (provider location):  {virtual location provider:356004}  Platform used for Video Visit: {Virtual Visit Platforms:361260::\"[facility]\"}  Signed Electronically by: Mike Cortez MD  {Email feedback regarding this note to [email or web address]   :216785}  "

## 2024-03-10 ASSESSMENT — ASTHMA QUESTIONNAIRES
QUESTION_3 LAST FOUR WEEKS HOW OFTEN DID YOUR ASTHMA SYMPTOMS (WHEEZING, COUGHING, SHORTNESS OF BREATH, CHEST TIGHTNESS OR PAIN) WAKE YOU UP AT NIGHT OR EARLIER THAN USUAL IN THE MORNING: NOT AT ALL
QUESTION_4 LAST FOUR WEEKS HOW OFTEN HAVE YOU USED YOUR RESCUE INHALER OR NEBULIZER MEDICATION (SUCH AS ALBUTEROL): NOT AT ALL
ACT_TOTALSCORE: 22
QUESTION_2 LAST FOUR WEEKS HOW OFTEN HAVE YOU HAD SHORTNESS OF BREATH: ONCE OR TWICE A WEEK
ACT_TOTALSCORE: 22
QUESTION_5 LAST FOUR WEEKS HOW WOULD YOU RATE YOUR ASTHMA CONTROL: SOMEWHAT CONTROLLED
QUESTION_1 LAST FOUR WEEKS HOW MUCH OF THE TIME DID YOUR ASTHMA KEEP YOU FROM GETTING AS MUCH DONE AT WORK, SCHOOL OR AT HOME: NONE OF THE TIME

## 2024-03-11 NOTE — PROGRESS NOTES
Assessment/Plan:    Amenorrhea  G1 at 4+3 weeks today by LMP - UPT positive today. Dating US ordered to be done in 2 weeks. Pregnancy folder given today. Plan follow-up in approx 6 weeks for 1st OB visit. Reviewed medication today. Pregnancy complicated by: obesity, anxiety on selective serotonin reuptake inhibitor, asthma.   - HCG qualitative urine  - US OB < 14 Weeks Single     Follow up: 6 weeks for initial OB visit    Emily Avalos MD  [facility]    Subjective:   Christa Erickson is a 28 year old female is here today for pregnancy confirmation    -LMP 2/10/24 - 4+3 wks today  -some nausea - fatigue - not really breast tenderness  -still lingering symptoms from cold   -overall doing well at this time      Patient Active Problem List   Diagnosis    Migraine headache without aura    OPAL (generalized anxiety disorder)    Allergic rhinitis    Astigmatism    Mild intermittent asthma without complication     Past Medical History:   Diagnosis Date    Abdominal migraine 7/9/2015    OPAL (generalized anxiety disorder)     Migraine headache without aura      Past Surgical History:   Procedure Laterality Date    DENTAL SURGERY      wisdom    LASIK       Current Outpatient Medications   Medication    citalopram (CELEXA) 20 MG tablet    EPINEPHrine (ANY BX GENERIC EQUIV) 0.3 MG/0.3ML injection 2-pack    loratadine (CLARITIN) 10 MG tablet    ondansetron (ZOFRAN ODT) 4 MG ODT tab    QVAR REDIHALER 40 MCG/ACT inhaler    VENTOLIN  (90 Base) MCG/ACT inhaler     No current facility-administered medications for this visit.     Allergies   Allergen Reactions    Tree Nuts [Nuts] Itching and Swelling     Social History     Socioeconomic History    Marital status:      Spouse name: Not on file    Number of children: Not on file    Years of education: Not on file    Highest education level: Not on file   Occupational History    Not on file   Tobacco Use    Smoking status: Never     Passive exposure: Never     "Smokeless tobacco: Never   Vaping Use    Vaping Use: Never used   Substance and Sexual Activity    Alcohol use: Yes     Comment: occ     Drug use: Never    Sexual activity: Yes   Other Topics Concern    Parent/sibling w/ CABG, MI or angioplasty before 65F 55M? Not Asked   Social History Narrative    Dec 2020: , lives with finance & 2 cats      Social Determinants of Health     Financial Resource Strain: Not on file   Food Insecurity: Not on file   Transportation Needs: Not on file   Physical Activity: Not on file   Stress: Not on file   Social Connections: Not on file   Interpersonal Safety: Low Risk  (3/12/2024)    Interpersonal Safety     Do you feel physically and emotionally safe where you currently live?: Yes     Within the past 12 months, have you been hit, slapped, kicked or otherwise physically hurt by someone?: No     Within the past 12 months, have you been humiliated or emotionally abused in other ways by your partner or ex-partner?: No   Housing Stability: Not on file     Family History   Problem Relation Age of Onset    Hyperlipidemia Mother     Depression Mother     Allergic rhinitis Mother     Other - See Comments Mother         hyperglycemia    Heart Surgery Father     Alcoholism Father     Depression Father     Nephrolithiasis Maternal Grandmother     Skin Cancer Maternal Grandfather     No Known Problems Paternal Grandmother     Skin Cancer Paternal Grandfather     Alcoholism Maternal Half-Brother     No Known Problems Maternal Half-Sister     Melanoma No family hx of      Review of systems is as stated in HPI, and the remainder of system review is otherwise negative.    Objective:     /70   Pulse 95   Temp 97.9  F (36.6  C) (Temporal)   Resp 18   Ht 1.702 m (5' 7\")   Wt 90.6 kg (199 lb 12.8 oz)   LMP 02/10/2024 (Exact Date)   SpO2 97%   BMI 31.29 kg/m      General appearance: awake, NAD  HEENT: atraumatic, normocephalic, no scleral icterus or " injection  Lungs: breathing comfortably on room air  Neuro: alert, oriented x3, CNs grossly intact, no focal deficits appreciated  Psych: normal mood/affect/behavior, answering questions appropriately, linear thought process

## 2024-03-12 ENCOUNTER — OFFICE VISIT (OUTPATIENT)
Dept: FAMILY MEDICINE | Facility: CLINIC | Age: 29
End: 2024-03-12
Payer: COMMERCIAL

## 2024-03-12 VITALS
HEART RATE: 95 BPM | BODY MASS INDEX: 31.36 KG/M2 | RESPIRATION RATE: 18 BRPM | WEIGHT: 199.8 LBS | TEMPERATURE: 97.9 F | OXYGEN SATURATION: 97 % | HEIGHT: 67 IN | DIASTOLIC BLOOD PRESSURE: 70 MMHG | SYSTOLIC BLOOD PRESSURE: 110 MMHG

## 2024-03-12 DIAGNOSIS — N91.2 AMENORRHEA: Primary | ICD-10-CM

## 2024-03-12 LAB — HCG UR QL: POSITIVE

## 2024-03-12 PROCEDURE — 99213 OFFICE O/P EST LOW 20 MIN: CPT | Performed by: FAMILY MEDICINE

## 2024-03-12 PROCEDURE — 81025 URINE PREGNANCY TEST: CPT | Performed by: FAMILY MEDICINE

## 2024-03-12 RX ORDER — LORATADINE 10 MG/1
10 TABLET ORAL DAILY
COMMUNITY

## 2024-03-12 ASSESSMENT — PAIN SCALES - GENERAL: PAINLEVEL: NO PAIN (0)

## 2024-03-12 NOTE — PATIENT INSTRUCTIONS
Call [phone] to schedule ultrasound if they do not call you      Nonprescription Medications Thought To Be Safe In Pregnancy (take medication according to package directions)    NAUSEA AND MOTION SICKNESS   Vitamin C 500 mg, once a day with food   Vitamin B6 50 mg, one three times a day   Unisom tablets (not gel tabs) - 1/2 to 1 tab at bedtime; may also take 1/2 tab in the morning and mid-afternoon   Dramamine   Sea Bands   Michelle tablets    CONGESTION AND COLDS   Chlortrimeton   Benadryl   Vicks Vapor Rub   Cough Drops  Avoid Robitussin and Mucinex in 1st trimester but otherwise safe during rest of pregnancy  Avoid pseudoephedrine     ALLERGIES   Alavert   Claritin   Tavist   Benadryl   Zyrtec    HEADACHES   Tylenol 325 mg 2-3 four times a day   Tylenol 500 mg 1-2 four times a day   DO NOT EXCEED 4,000 MG A DAY  DO NOT TAKE IBUPROFEN, MOTRIN, ADVIL, ASPIRIN, OR ALEVE     VAGINAL YEAST INFECTION   Monistat 3 or 7   Gyne-Lotrimin    HEMORRHOIDS   Preparation-H   Anusol   Anusol HC    HEARTBURN   Tums   Maalox (tablets or liquid)   Rolaids   Mylanta   Gaviscon   Pepcid AC  NO PEPTOBISMOL OR ALKASELTZER (contains aspirin)     DIARRHEA (do not treat for the first 24-48 hrs)   Kaopectate   Imodium AD    CONSTIPATION   Colace (Docusate Sodium) - stool softener   Cheryl-Colace (Colace + mild stimulant)   Any fiber supplement (Metamucil, Fibercon ,etc.)    GAS   Gas-X   Mylanta II with Simethicone   Mylicon    INSECT BITES   Lotions: Calamine, Caladryl, Benadryl   Oral: Benadryl tabs 25-50mg (every 6-8hrs)     Tips to Relieve Nausea  Although nausea can occur at any time of the day, it may be worse in the morning. To help prevent nausea:  Eat small, light meals at frequent intervals.  Get up slowly. Eat a few unsalted crackers before you get out of bed.  Drink water or 7-Up to soothe your stomach.  Eat an ice pop in your favorite flavor.  Ask your health care provider about taking michelle or vitamin B6 for nausea and  vomiting.  Talk with your health care provider if you take vitamins that upset your stomach.  Safe Medications  Take one prenatal vitamin with at least 400 mcg of folic acid daily.  Use acetaminophen (Tylenol) for pain.   Try Maalox or Tums for heartburn. If these are not working, talk to your doctor about trying a different medication.  Diphenhydramine (Benadryl) can also be used safely in pregnancy.  Talk to your doctor about any other medications or vitamins you are taking!  Start Healthy Habits Now  What matters most is protecting your baby from this moment on. If you smoke, drink alcohol, or use drugs, now is the time to stop. If you need help, talk with your health care provider.  Smoking increases the risk of miscarriage or having a low-birth-weight baby. If you smoke, quit now.  Alcohol and drugs have been linked with miscarriage, birth defects, intellectual disability, and low birth weight. Do not drink alcohol or take drugs.  Continue your current exercise routine, or start one with walking, swimming, and other low impact exercises. Yoga specifically designed for pregnant moms is a great stress and pain reliever. Keep your self well hydrated and avoid overheating with all activity. If bleeding, fluid leakage, or cramping/contractions occur, stop the exercise and call your doctor.   Wear your seatbelt every time you are in the car. Fasten the lap part underneath your growing belly and the shoulder part as you normally would.   Weight Gain  Add an additional 300 to 400 calories a day into your diet.  Ideal weight gain is 25 to 35 pounds.  If your BMI is over 30, your ideal weight gain is 20 pounds.  Talk to your doctor if you are concerned about weight gain.   Keep Your Environment Save  You can still clean house and use scented products. Just take some simple precautions:  Wear gloves when using cleaning fluids.  Open windows to let in fresh air. Use a fan if you paint.  Avoid secondhand smoke.  Don t  breathe fumes from nail polish, hair spray, cleansers, or other chemicals.  Work Concerns  The end of the first trimester is a good time to discuss working during pregnancy with your employer. Follow your health care provider s advice if your job requires you to stand for a long time, work with hazardous tools, or even sit at a desk all day. Your workspace, workload, or scheduled hours may need to be adjusted - perhaps you can change body postures more often or take an extra break.  Intimacy  Unless your health care provider tells you to, there is no reason to stop having sex while you re pregnant. You or your partner may notice changes in desire. Desire may be less in the first trimester, due to nausea and fatigue. In the second trimester, sex may be very enjoyable. The third trimester can be a challenge comfort-wise. Try different positions and see what s best for you both. As always, let your doctor know if you experience any bleeding, leakage of fluids, or cramping/contractions.  Other Pregnancy Concerns  Limit the amount of radiation you are exposed to. Always tell the radiology technologist that you are pregnant if having an xray or CT scan done.   Practice good hand washing to prevent infection.  Avoid cat litter.   Wash all fruits and vegetabes. Always cook meat thoroughly and do not eat raw fish. Do not eat more than 6 to 12 ounces of other fish sources.   Do not eat soft cheeses.  Limit caffeine to less than 200 milligrams a days. The average cup of coffee as approximately 120 milligrams of caffeine.

## 2024-03-22 ENCOUNTER — TELEPHONE (OUTPATIENT)
Dept: FAMILY MEDICINE | Facility: CLINIC | Age: 29
End: 2024-03-22

## 2024-03-22 NOTE — TELEPHONE ENCOUNTER
Nurse Triage SBAR    Situation:   Per eVisit submitted for cough today, there is not much info provided.    Background:   LMP 2/10/24 - 5w6d today       Assessment:   Needs to triage cough.    Recommendation:   Left message to return call. Please route to RN queue to triage when pt returns call.    YOAN DejesusN, RN  [facility]

## 2024-03-25 NOTE — TELEPHONE ENCOUNTER
Patient read mychart from [facility] requesting more information regarding sx either by calling clinic or re-submitting e-visit.     Closing encounter.    TIANNA Delgadillo, RN  [facility]

## 2024-03-27 ENCOUNTER — HOSPITAL ENCOUNTER (OUTPATIENT)
Dept: ULTRASOUND IMAGING | Facility: HOSPITAL | Age: 29
Discharge: HOME | End: 2024-03-27
Attending: FAMILY MEDICINE | Admitting: FAMILY MEDICINE
Payer: COMMERCIAL

## 2024-03-27 DIAGNOSIS — N91.2 AMENORRHEA: ICD-10-CM

## 2024-03-27 PROCEDURE — 76801 OB US < 14 WKS SINGLE FETUS: CPT

## 2024-04-15 NOTE — PROGRESS NOTES
Clinic Note - Initial OB Visit    Christa Erickson is a 28 year old  female who presents to clinic for a new OB visit.      Her last menstrual period was 2/10/24.  Estimated Date of Delivery: 2024 via LMP c/w 1st tri US - 9+5 weeks today    She has not had bleeding since her LMP. She has not had any abdominal pain or cramping since her LMP. She has had mild nausea. Weigh loss has occurred, for a total of 1 pounds. This was a planned pregnancy.     Asthma doing ok - no flares - allergies going ok    No migraines    Mood doing good    Pre Term Labor Risk Assessment  Is the patient's age <18 or >40? No  Patint's BMI is Body mass index is 30.09 kg/m .. Does patient have a BMI < 18.5? No  If previous pregnancy, was delivery within previous 6 months? No  Have you ever delivered a baby prior to 37 weeks gestation? No  Did conception for this pregnancy occur via In Vitro Fertilization? No  Summary: Patient is not high risk for  Labor for  labor.    Patient Active Problem List   Diagnosis    Migraine headache without aura    OPAL (generalized anxiety disorder)    Allergic rhinitis    Astigmatism    Mild intermittent asthma without complication     OB History    Para Term  AB Living   1 0 0 0 0 0   SAB IAB Ectopic Multiple Live Births   0 0 0 0 0      # Outcome Date GA Lbr Alphonse/2nd Weight Sex Type Anes PTL Lv   1 Current              Past Medical History:   Diagnosis Date    Abdominal migraine 2015    OPAL (generalized anxiety disorder)     Migraine headache without aura      Past Surgical History:   Procedure Laterality Date    DENTAL SURGERY      wisdom    LASIK       Current Outpatient Medications   Medication Sig Dispense Refill    citalopram (CELEXA) 20 MG tablet TAKE 1 TABLET(20 MG) BY MOUTH DAILY 90 tablet 3    loratadine (CLARITIN) 10 MG tablet Take 10 mg by mouth daily      QVAR REDIHALER 40 MCG/ACT inhaler 1 puff twice daily      VENTOLIN  (90 Base) MCG/ACT inhaler  Inhale 2 puffs into the lungs every 4 hours as needed for wheezing      EPINEPHrine (ANY BX GENERIC EQUIV) 0.3 MG/0.3ML injection 2-pack          Do you have a history of any of the following medical conditions?  Condition Yes/No   Hypertension No   Heart disease, mitral valve prolapse, rheumatic fever No   Asthma or another chronic lung disease YES   An autoimmune disorder No   Kidney disease No   Frequent urinary tract infections No   Migraine headaches YES   Stroke, loss of sensation/function, seizures, or other neuro problem No   Diabetes No   Thyroid problems or have you taken thyroid medication No   Hepatitis, liver disease, jaundice No   Blood clots, phlebitis, pulmonary embolism or varicose veins No   Excessive bleeding after surgery or dental work No   Heavy menstrual periods requiring treatment No   Anemia No   Blood transfusions No        Would you refuse a blood transfusion? No     Prenatal Genetic Screening  Do you have a history of any of the following Yes/No        A metabolic disorder (e.g. Insulin-dependent DM, PKU) No        Recurrent pregnancy loss No     Do you, the baby's father, or anyone in your families have Yes/No        Thalassemia AND MCV <80 No        Hemophilia No        Neural tube defect No        Congenital heart defect No        Sickle cell disease or trait No        Muscular dystrophy No        Cystic fibrosis No        Mental retardation or autism No        Down's syndrome No        Hunter-Sach's disease No        [place]'s chorea No        Any other inherited genetic or chromosomal disorder No        A child with birth defects not listed above No   FOB none    Infection History  At high risk for coming in contact with HIV No   Ever treated for tuberculosis No   Ever received the BCG vaccine for tuberculosis No   Ever had genital herpes (or has your partner) No   Had a rash or viral illness since LMP No   Ever had a sexually transmitted infection No   Ever had chicken pox or the  "vaccine Yes   Ever had any other serious infectious disease No   Up to date with immunizations Yes      PERSONAL/SOCIAL HISTORY  Social History     Socioeconomic History    Marital status:    Tobacco Use    Smoking status: Never     Passive exposure: Never    Smokeless tobacco: Never   Vaping Use    Vaping status: Never Used   Substance and Sexual Activity    Alcohol use: Yes     Comment: occ     Drug use: Never    Sexual activity: Yes   Social History Narrative    Dec 2020: , lives with finance & 2 cats      Social Determinants of Health     Interpersonal Safety: Low Risk  (3/12/2024)    Interpersonal Safety     Do you feel physically and emotionally safe where you currently live?: Yes     Within the past 12 months, have you been hit, slapped, kicked or otherwise physically hurt by someone?: No     Within the past 12 months, have you been humiliated or emotionally abused in other ways by your partner or ex-partner?: No     Have you used any tobacco products, alcohol or any other drugs during this pregnancy before or after your knew you were pregnant? no    REVIEW OF SYSTEMS  C: NEGATIVE for fever, chills, change in weight  E: NEGATIVE for vision changes or irritation  ENT: NEGATIVE for ear, mouth and throat problems  R: NEGATIVE for significant cough or SOB  B: NEGATIVE for masses, tenderness or discharge  CV: NEGATIVE for chest pain, palpitations or peripheral edema  GI: NEGATIVE for abdominal pain, heartburn, or change in bowel habits  : NEGATIVE for unusual urinary or vaginal symptoms.   M: NEGATIVE for significant arthralgias or myalgia  N: NEGATIVE for weakness, dizziness or paresthesias  P: NEGATIVE for changes in mood or affect    PHYSICAL EXAM:  /60   Pulse 90   Temp 97.7  F (36.5  C) (Temporal)   Resp 16   Ht 1.727 m (5' 8\")   Wt 89.8 kg (197 lb 14.4 oz)   LMP 02/10/2024 (Exact Date)   SpO2 97%   BMI 30.09 kg/m     GENERAL:  Pleasant pregnant female, alert, " well groomed.  SKIN:  Warm and dry, without lesions or rashes  LUNGS: Breathing comfortably on room air  ABDOMEN: Soft without masses , tenderness or organomegaly.  Fundus palpable at symphysis pubis. Active cardiac movement on handheld US.  MUSCULOSKELETAL:  Full range of motion.  EXTREMITIES:  No edema. No significant varicosities.     ASSESSMENT/PLAN  Supervision of normal first pregnancy, antepartum  G1 at 9+5 weeks today.  Pregnancy complicated by obesity, anxiety on SSRI, asthma.  Labs ordered today, patient will do urine testing today and return next week for blood testing (after 10 weeks gestation) including genetic screening.  - ABO/Rh type and screen  - Hepatitis B surface antigen  - CBC with platelets  - HIV Antigen Antibody Combo  - Rubella Antibody IgG  - Treponema Abs w Reflex to RPR and Titer  - Urine Culture Aerobic Bacterial  - Chlamydia trachomatis PCR  - Neisseria gonorrhoeae PCR  - *UA reflex to Microscopic  - Hepatitis C antibody  - Myriad Non-Invasive Prenatal Screening-Prequel  - Urine Microscopic Exam    Obesity affecting pregnancy, antepartum, unspecified obesity type  Pregravid BMI 30.26.  Discussed goal weight gain less than 20 pounds during pregnancy.    Mild intermittent asthma without complication  Well-controlled at this time.    OPAL (generalized anxiety disorder)  Doing well on Celexa 20 mg daily.       - Routine prenatal labs today. CBC, type and screen, rubella, HIV, gonorrhea/chlamydia, RPR, hepatitis B, urinalysis with culture.   - Pap smear up to date  - Early ultrasound for dating completed.     Referral(s): none    Discussed:  -Recommended weight gain of <20 lbs for BMI of Body mass index is 30.09 kg/m ..  -Genetic screening options, including false positive rate of 5% with screening tests and diagnostic options (chorionic villus sampling, amniocentesis), and patient desires - myriad ordered to be done after 10 weeks.  -Safe medications during pregnancy. Is currently taking  prenatal vitamin daily.   -Healthy habits including not using tobacco or alcohol, exercising regularly and maintaining healthy diet  -Information given on tips for dealing with nausea, healthy habits, exposures, safety, prenatal appointment schedule, and when to call the doctor.  -Recommendations for breastfeeding.    Will follow up in 4 weeks for routine pre- care.    Emily Avalos MD  [facility]

## 2024-04-18 ENCOUNTER — PRENATAL OFFICE VISIT (OUTPATIENT)
Dept: FAMILY MEDICINE | Facility: CLINIC | Age: 29
End: 2024-04-18
Payer: COMMERCIAL

## 2024-04-18 VITALS
RESPIRATION RATE: 16 BRPM | OXYGEN SATURATION: 97 % | HEIGHT: 68 IN | TEMPERATURE: 97.7 F | BODY MASS INDEX: 29.99 KG/M2 | SYSTOLIC BLOOD PRESSURE: 110 MMHG | DIASTOLIC BLOOD PRESSURE: 60 MMHG | WEIGHT: 197.9 LBS | HEART RATE: 90 BPM

## 2024-04-18 DIAGNOSIS — F41.1 GAD (GENERALIZED ANXIETY DISORDER): ICD-10-CM

## 2024-04-18 DIAGNOSIS — Z34.00 SUPERVISION OF NORMAL FIRST PREGNANCY, ANTEPARTUM: Primary | ICD-10-CM

## 2024-04-18 DIAGNOSIS — O99.210 OBESITY AFFECTING PREGNANCY, ANTEPARTUM, UNSPECIFIED OBESITY TYPE: ICD-10-CM

## 2024-04-18 DIAGNOSIS — J45.20 MILD INTERMITTENT ASTHMA WITHOUT COMPLICATION: ICD-10-CM

## 2024-04-18 LAB
ALBUMIN UR-MCNC: NEGATIVE MG/DL
AMORPH CRY #/AREA URNS HPF: ABNORMAL /HPF
APPEARANCE UR: CLEAR
BACTERIA #/AREA URNS HPF: ABNORMAL /HPF
BILIRUB UR QL STRIP: NEGATIVE
COLOR UR AUTO: YELLOW
GLUCOSE UR STRIP-MCNC: NEGATIVE MG/DL
HGB UR QL STRIP: NEGATIVE
KETONES UR STRIP-MCNC: NEGATIVE MG/DL
LEUKOCYTE ESTERASE UR QL STRIP: ABNORMAL
NITRATE UR QL: NEGATIVE
PH UR STRIP: 7.5 [PH] (ref 5–8)
RBC #/AREA URNS AUTO: ABNORMAL /HPF
SP GR UR STRIP: 1.02 (ref 1–1.03)
SQUAMOUS #/AREA URNS AUTO: ABNORMAL /LPF
UROBILINOGEN UR STRIP-ACNC: 0.2 E.U./DL
WBC #/AREA URNS AUTO: ABNORMAL /HPF

## 2024-04-18 PROCEDURE — 81001 URINALYSIS AUTO W/SCOPE: CPT | Performed by: FAMILY MEDICINE

## 2024-04-18 PROCEDURE — 87491 CHLMYD TRACH DNA AMP PROBE: CPT | Performed by: FAMILY MEDICINE

## 2024-04-18 PROCEDURE — 99214 OFFICE O/P EST MOD 30 MIN: CPT | Performed by: FAMILY MEDICINE

## 2024-04-18 PROCEDURE — 87591 N.GONORRHOEAE DNA AMP PROB: CPT | Performed by: FAMILY MEDICINE

## 2024-04-18 PROCEDURE — 87086 URINE CULTURE/COLONY COUNT: CPT | Performed by: FAMILY MEDICINE

## 2024-04-18 ASSESSMENT — PAIN SCALES - GENERAL: PAINLEVEL: NO PAIN (0)

## 2024-04-19 LAB
BACTERIA UR CULT: NORMAL
C TRACH DNA SPEC QL NAA+PROBE: NEGATIVE
N GONORRHOEA DNA SPEC QL NAA+PROBE: NEGATIVE

## 2024-04-21 LAB
ABO/RH(D): NORMAL
ANTIBODY SCREEN: NEGATIVE
SPECIMEN EXPIRATION DATE: NORMAL

## 2024-04-22 ENCOUNTER — LAB (OUTPATIENT)
Dept: LAB | Facility: CLINIC | Age: 29
End: 2024-04-22
Payer: COMMERCIAL

## 2024-04-22 DIAGNOSIS — Z34.00 SUPERVISION OF NORMAL FIRST PREGNANCY, ANTEPARTUM: ICD-10-CM

## 2024-04-22 LAB
ERYTHROCYTE [DISTWIDTH] IN BLOOD BY AUTOMATED COUNT: 11.7 % (ref 10–15)
HCT VFR BLD AUTO: 38.6 % (ref 35–47)
HGB BLD-MCNC: 13.4 G/DL (ref 11.7–15.7)
MCH RBC QN AUTO: 29.8 PG (ref 26.5–33)
MCHC RBC AUTO-ENTMCNC: 34.7 G/DL (ref 31.5–36.5)
MCV RBC AUTO: 86 FL (ref 78–100)
PLATELET # BLD AUTO: 264 10E3/UL (ref 150–450)
RBC # BLD AUTO: 4.49 10E6/UL (ref 3.8–5.2)
WBC # BLD AUTO: 8.7 10E3/UL (ref 4–11)

## 2024-04-22 PROCEDURE — 86901 BLOOD TYPING SEROLOGIC RH(D): CPT

## 2024-04-22 PROCEDURE — 85027 COMPLETE CBC AUTOMATED: CPT

## 2024-04-22 PROCEDURE — 86780 TREPONEMA PALLIDUM: CPT

## 2024-04-22 PROCEDURE — 86850 RBC ANTIBODY SCREEN: CPT

## 2024-04-22 PROCEDURE — 86803 HEPATITIS C AB TEST: CPT

## 2024-04-22 PROCEDURE — 87340 HEPATITIS B SURFACE AG IA: CPT

## 2024-04-22 PROCEDURE — 87389 HIV-1 AG W/HIV-1&-2 AB AG IA: CPT

## 2024-04-22 PROCEDURE — 86762 RUBELLA ANTIBODY: CPT

## 2024-04-22 PROCEDURE — 86900 BLOOD TYPING SEROLOGIC ABO: CPT

## 2024-04-22 PROCEDURE — 36415 COLL VENOUS BLD VENIPUNCTURE: CPT

## 2024-04-23 ENCOUNTER — MYC MEDICAL ADVICE (OUTPATIENT)
Dept: FAMILY MEDICINE | Facility: CLINIC | Age: 29
End: 2024-04-23
Payer: COMMERCIAL

## 2024-04-23 DIAGNOSIS — O21.9 NAUSEA AND VOMITING DURING PREGNANCY: Primary | ICD-10-CM

## 2024-04-23 LAB
HBV SURFACE AG SERPL QL IA: NONREACTIVE
HCV AB SERPL QL IA: NONREACTIVE
HIV 1+2 AB+HIV1 P24 AG SERPL QL IA: NONREACTIVE
RUBV IGG SERPL QL IA: 2.93 INDEX
RUBV IGG SERPL QL IA: POSITIVE
T PALLIDUM AB SER QL: NONREACTIVE

## 2024-04-24 RX ORDER — ONDANSETRON 4 MG/1
4 TABLET, ORALLY DISINTEGRATING ORAL EVERY 8 HOURS PRN
Qty: 20 TABLET | Refills: 1 | Status: SHIPPED | OUTPATIENT
Start: 2024-04-24

## 2024-04-29 LAB — SCANNED LAB RESULT: NORMAL

## 2024-05-20 NOTE — PATIENT INSTRUCTIONS
Phone Numbers:  [facility]: [phone]  [facility]: [phone]    When to call or come in to the hospital   If you have any bleeding or leakage of fluids.   If you have uterine cramping that does not resolve with rest and fluids.  If you have a headache not resolved with tylenol, right upper abdominal pain, or sudden onset of swelling.  You know your body best. Never hesitate to call or go to the hospital if something doesn't feel right!    Genetic Screening  Sampling of your blood to screen for genetic abnormalities, like Down's syndrome, in your baby  Screening test only - further testing, like advanced ultrasound or amniocentesis, would be needed to confirm positive test  Recommended for mothers over age 35, history of genetic abnormalities - but offered to all pregnant women.  Talk to your doctor if you have further questions, or are interested in this screening test.     Breastfeeding  Breast feeding is best, for you and baby!   Recommendations are for exclusive breastfeeding for babies first 6 months of life.  Talk to your doctor if you have more questions about breastfeeding your baby.    Other Pregnancy Concerns  Continue to take a prenatal vitamin daily  Maintain an active, healthy lifestyle.   If this is your first baby, you can expect to start feeling movement at 20-24 weeks gestation. If this is your second or more pregnancy, you will generally start feeling movement at 18-22 weeks gestation.   [facility] parenting classes [email or web address]  [place] parenting classes [email or web address]  Free online classes through [employer]

## 2024-05-20 NOTE — PROGRESS NOTES
"Clinic Note - Return OB Visit    Christa Erickson is a 28 year old  female who presents to clinic for a follow up OB visit. She is currently 14w3d with an estimated date of delivery of 2024 via LMP c/w 1st tri . She denies headache, chest pain, shortness of breath, abdominal pain/contractions, vaginal bleeding, or abnormal discharge. She has not felt baby move.     New concerns today:   -nausea improving  -some abd pain/stretching  -bad allergies lately    OB History    Para Term  AB Living   1 0 0 0 0 0   SAB IAB Ectopic Multiple Live Births   0 0 0 0 0      # Outcome Date GA Lbr Alphonse/2nd Weight Sex Type Anes PTL Lv   1 Current                Physical exam:  /70   Pulse 98   Temp 97.8  F (36.6  C) (Temporal)   Resp 18   Ht 1.727 m (5' 8\")   Wt 89.6 kg (197 lb 9.6 oz)   LMP 02/10/2024 (Exact Date)   SpO2 98%   BMI 30.04 kg/m      General: alert, female in no acute distress  Abdomen: gravid uterus appropriate for gestation age above pubic symphysis, non-tender, FHTs 150  Extremities: no edema    Supervision of normal first pregnancy, antepartum  G1 at 14+3 weeks today.  Pregnancy complicated by obesity, anxiety on SSRI, asthma.     Obesity affecting pregnancy, antepartum, unspecified obesity type  Pregravid BMI 30.26 - 1 lbs lost thus far.  Discussed goal weight gain less than 20 pounds during pregnancy.    Mild intermittent asthma without complication  Well-controlled at this time.    OPAL (generalized anxiety disorder)  Doing well on Celexa 20 mg daily.      Reviewed pre-edwin labs: O pos, myriad nml, girl    Follow up in 4 weeks for routine prenatal visit.     Emily Avalos MD  [facility]     "

## 2024-05-21 ENCOUNTER — PRENATAL OFFICE VISIT (OUTPATIENT)
Dept: FAMILY MEDICINE | Facility: CLINIC | Age: 29
End: 2024-05-21
Payer: COMMERCIAL

## 2024-05-21 VITALS
HEIGHT: 68 IN | DIASTOLIC BLOOD PRESSURE: 70 MMHG | RESPIRATION RATE: 18 BRPM | WEIGHT: 197.6 LBS | SYSTOLIC BLOOD PRESSURE: 110 MMHG | TEMPERATURE: 97.8 F | OXYGEN SATURATION: 98 % | HEART RATE: 98 BPM | BODY MASS INDEX: 29.95 KG/M2

## 2024-05-21 DIAGNOSIS — F41.1 GAD (GENERALIZED ANXIETY DISORDER): ICD-10-CM

## 2024-05-21 DIAGNOSIS — J45.20 MILD INTERMITTENT ASTHMA WITHOUT COMPLICATION: ICD-10-CM

## 2024-05-21 DIAGNOSIS — O99.210 OBESITY AFFECTING PREGNANCY, ANTEPARTUM, UNSPECIFIED OBESITY TYPE: ICD-10-CM

## 2024-05-21 DIAGNOSIS — Z34.00 SUPERVISION OF NORMAL FIRST PREGNANCY, ANTEPARTUM: Primary | ICD-10-CM

## 2024-05-21 PROCEDURE — 99207 PR PRENATAL VISIT: CPT | Performed by: FAMILY MEDICINE

## 2024-05-21 ASSESSMENT — PAIN SCALES - GENERAL: PAINLEVEL: NO PAIN (0)

## 2024-06-19 NOTE — PATIENT INSTRUCTIONS
Phone Numbers:  [facility]: [phone]  [facility]: [phone]    When to call or come in to the hospital   If you have any bleeding or leakage of fluids.   If you have uterine cramping that does not resolve with rest and fluids.  If you have a headache not resolved with tylenol, right upper abdominal pain, or sudden onset of swelling.  You know your body best. Never hesitate to call or go to the hospital if something doesn't feel right!    Genetic Screening  Sampling of your blood to screen for genetic abnormalities, like Down's syndrome, in your baby  Screening test only - further testing, like advanced ultrasound or amniocentesis, would be needed to confirm positive test  Recommended for mothers over age 35, history of genetic abnormalities - but offered to all pregnant women.  Talk to your doctor if you have further questions, or are interested in this screening test.     Breastfeeding  Breast feeding is best, for you and baby!   Recommendations are for exclusive breastfeeding for babies first 6 months of life.  Talk to your doctor if you have more questions about breastfeeding your baby.    Other Pregnancy Concerns  Continue to take a prenatal vitamin daily  Maintain an active, healthy lifestyle.   If this is your first baby, you can expect to start feeling movement at 20-24 weeks gestation. If this is your second or more pregnancy, you will generally start feeling movement at 18-22 weeks gestation.     [facility] parenting classes [email or web address]  [place] parenting classes [email or web address]  Free online classes through [employer]

## 2024-06-19 NOTE — PROGRESS NOTES
"Clinic Note - Return OB Visit    Christa Erickson is a 28 year old  female who presents to clinic for a follow up OB visit. She is currently 18w5d with an estimated date of delivery of 2024 via LMP c/w 1st tri . She denies headache, chest pain, shortness of breath, abdominal pain/contractions, vaginal bleeding, or abnormal discharge. She has maybe felt baby move.     New concerns today:   - has COVID right now - pt feeling well right now    OB History    Para Term  AB Living   1 0 0 0 0 0   SAB IAB Ectopic Multiple Live Births   0 0 0 0 0      # Outcome Date GA Lbr Alphonse/2nd Weight Sex Type Anes PTL Lv   1 Current                Physical exam:  /68 (BP Location: Right arm, Patient Position: Sitting, Cuff Size: Adult Regular)   Pulse 97   Temp 97.7  F (36.5  C) (Temporal)   Resp 20   Ht 1.73 m (5' 8.11\")   Wt 91.1 kg (200 lb 12.8 oz)   LMP 02/10/2024 (Exact Date)   SpO2 98%   BMI 30.43 kg/m      General: alert, female in no acute distress  Abdomen: gravid uterus appropriate for gestation age above pubic symphysis, FHTs 145  Extremities: no edema    Supervision of normal first pregnancy, antepartum  G1 at 18+5 weeks today.  Pregnancy complicated by obesity, anxiety on SSRI, asthma. Fetal survey ordered today.    Obesity affecting pregnancy, antepartum, unspecified obesity type  Pregravid BMI 30.26 - 1 1/2 lbs gain thus far.  Discussed goal weight gain less than 20 pounds during pregnancy.    Mild intermittent asthma without complication  Well-controlled at this time.    OPAL (generalized anxiety disorder)  Doing well on Celexa 20 mg daily.      Reviewed pre-edwin labs: O pos, myriad nml, girl    Follow up in 4 weeks for routine prenatal visit.     Emily Avalos MD  [facility]     "

## 2024-06-20 ENCOUNTER — PRENATAL OFFICE VISIT (OUTPATIENT)
Dept: FAMILY MEDICINE | Facility: CLINIC | Age: 29
End: 2024-06-20
Payer: COMMERCIAL

## 2024-06-20 VITALS
RESPIRATION RATE: 20 BRPM | DIASTOLIC BLOOD PRESSURE: 68 MMHG | TEMPERATURE: 97.7 F | HEIGHT: 68 IN | WEIGHT: 200.8 LBS | SYSTOLIC BLOOD PRESSURE: 110 MMHG | BODY MASS INDEX: 30.43 KG/M2 | HEART RATE: 97 BPM | OXYGEN SATURATION: 98 %

## 2024-06-20 DIAGNOSIS — F41.1 GAD (GENERALIZED ANXIETY DISORDER): ICD-10-CM

## 2024-06-20 DIAGNOSIS — Z34.00 SUPERVISION OF NORMAL FIRST PREGNANCY, ANTEPARTUM: Primary | ICD-10-CM

## 2024-06-20 DIAGNOSIS — J45.20 MILD INTERMITTENT ASTHMA WITHOUT COMPLICATION: ICD-10-CM

## 2024-06-20 DIAGNOSIS — O99.210 OBESITY AFFECTING PREGNANCY, ANTEPARTUM, UNSPECIFIED OBESITY TYPE: ICD-10-CM

## 2024-06-20 PROCEDURE — 99207 PR PRENATAL VISIT: CPT | Performed by: FAMILY MEDICINE

## 2024-06-20 RX ORDER — VITAMIN A ACETATE, .BETA.-CAROTENE, ASCORBIC ACID, CHOLECALCIFEROL, .ALPHA.-TOCOPHEROL ACETATE, DL-, THIAMINE MONONITRATE, RIBOFLAVIN, NIACINAMIDE, PYRIDOXINE HYDROCHLORIDE, FOLIC ACID, CYANOCOBALAMIN, CALCIUM CARBONATE, FERROUS FUMARATE, ZINC OXIDE, AND CUPRIC OXIDE 2000; 2000; 120; 400; 22; 1.84; 3; 20; 10; 1; 12; 200; 27; 25; 2 [IU]/1; [IU]/1; MG/1; [IU]/1; MG/1; MG/1; MG/1; MG/1; MG/1; MG/1; UG/1; MG/1; MG/1; MG/1; MG/1
1 TABLET ORAL DAILY
COMMUNITY

## 2024-06-20 ASSESSMENT — PAIN SCALES - GENERAL: PAINLEVEL: NO PAIN (0)

## 2024-07-01 ENCOUNTER — HOSPITAL ENCOUNTER (OUTPATIENT)
Dept: ULTRASOUND IMAGING | Facility: HOSPITAL | Age: 29
Discharge: HOME | End: 2024-07-01
Attending: FAMILY MEDICINE | Admitting: FAMILY MEDICINE
Payer: COMMERCIAL

## 2024-07-01 DIAGNOSIS — Z34.00 SUPERVISION OF NORMAL FIRST PREGNANCY, ANTEPARTUM: ICD-10-CM

## 2024-07-01 DIAGNOSIS — O99.210 OBESITY AFFECTING PREGNANCY, ANTEPARTUM, UNSPECIFIED OBESITY TYPE: ICD-10-CM

## 2024-07-01 PROCEDURE — 76805 OB US >/= 14 WKS SNGL FETUS: CPT

## 2024-07-14 ENCOUNTER — HEALTH MAINTENANCE LETTER (OUTPATIENT)
Age: 29
End: 2024-07-14

## 2024-07-18 NOTE — PROGRESS NOTES
"Clinic Note - Return OB Visit    Christa Erickson is a 28 year old  female who presents to clinic for a follow up OB visit. She is currently 22w6d with an estimated date of delivery of 2024 via LMP c/w 1st tri US. She denies headache, chest pain, shortness of breath, abdominal pain/contractions, vaginal bleeding, or abnormal discharge. She has felt baby move.     New concerns today:   -doing well    OB History    Para Term  AB Living   1 0 0 0 0 0   SAB IAB Ectopic Multiple Live Births   0 0 0 0 0      # Outcome Date GA Lbr Alphonse/2nd Weight Sex Type Anes PTL Lv   1 Current                Physical exam:  /78 (BP Location: Left arm, Patient Position: Sitting, Cuff Size: Adult Regular)   Pulse 96   Temp 97.7  F (36.5  C) (Temporal)   Resp 22   Ht 1.73 m (5' 8.11\")   Wt 91.2 kg (201 lb)   LMP 02/10/2024 (Exact Date)   SpO2 97%   BMI 30.46 kg/m      General: alert, female in no acute distress  Abdomen: gravid uterus at 24 cm, FHTs 140  Extremities: no edema    Supervision of normal first pregnancy, antepartum  G1 at 22+6 weeks today.  Pregnancy complicated by obesity, anxiety on SSRI, asthma.     Obesity affecting pregnancy, antepartum, unspecified obesity type  Pregravid BMI 30.26 - gained 2 lbs thus far.  Discussed goal weight gain less than 20 pounds during pregnancy.    Mild intermittent asthma without complication  Well-controlled at this time.    OPAL (generalized anxiety disorder)  Doing well on Celexa 20 mg daily.      Reviewed pre-edwin labs: O pos, myriad nml, girl    Follow up in 4 weeks for routine prenatal visit.     Emily Avalos MD  [facility]     "

## 2024-07-18 NOTE — PATIENT INSTRUCTIONS
Phone Numbers:  [facility]: [phone]  [facility]: [phone]     When to call or come in to the hospital  If you notice a decrease in your baby's movement.   If your begin to experience contractions that are 5 minutes or less apart and lasting for over 45 seconds, or if contractions are becoming more painful.  If you have any bleeding or leakage of fluids.   If you have a headache not resolved with tylenol, right upper abdominal pain, or sudden onset of swelling.  You know your body best. Never hesitate to call or go to the hospital if something doesn't feel right!    Gestational Diabetes Screen  At your next visit, you will be screened for gestational diabetes. You will drink a sugary drink and then have your blood drawn to see how your body responds to a sugar load. This test takes about an hour to complete - please plan your schedule accordingly!    The Benefits of Breastfeeding   Breastfeeding gives your new baby the very best start. It supplies nutrition, comfort, and love. Experts agree: Breastfeeding is the healthiest choice for babies during the first year of life and beyond. It s healthy for Mom, too. Breastfeeding may be challenging at first. But with support, you and your baby can succeed together.      Healthiest for Baby   Breastmilk is the ideal food for babies. It has all the nutrients your little one needs to grow healthy and strong. Here are some of the many benefits for your baby:   Breastfeeding provides contact that babies love. Frequent skin-to-skin time with Mom is calming and comforting.   Breastmilk is full of antibodies. These are substances that help your baby fight infection. Breastmilk reduces your baby's risk of respiratory illnesses, ear infections, and diarrhea.   Breastfeeding reduces your baby s risk of allergies, colds, and many other diseases.   Breastmilk changes as your baby grows, meeting her changing needs.   Breastmilk is easy for your baby to digest.   Breastmilk  contains DHA, a fat that is good for your baby s developing brain and eyes.   Breastmilk decreases the risk of sudden infant death syndrome (SIDS).    Healthiest for Mom   For many women, breastfeeding is an amazing experience. It creates a strong bond between mother and baby. Other benefits for Mom include:   You can feel proud knowing that your baby is growing healthy and strong because of your milk.   Breastmilk is convenient. It s free, clean, and always the right temperature.   Breastfeeding burns calories. This can help you lose pregnancy weight faster.   Breastfeeding releases hormones that contract the uterus. This helps the uterus return to its normal size after childbirth.   Mothers who breastfeed have a decreased risk of ovarian and breast cancers.    [place] parenting classes [email or web address]  [place] parenting classes [email or web address]  Free online classes through [employer]

## 2024-07-19 ENCOUNTER — PRENATAL OFFICE VISIT (OUTPATIENT)
Dept: FAMILY MEDICINE | Facility: CLINIC | Age: 29
End: 2024-07-19
Payer: COMMERCIAL

## 2024-07-19 VITALS
WEIGHT: 201 LBS | TEMPERATURE: 97.7 F | SYSTOLIC BLOOD PRESSURE: 116 MMHG | OXYGEN SATURATION: 97 % | DIASTOLIC BLOOD PRESSURE: 78 MMHG | RESPIRATION RATE: 22 BRPM | HEIGHT: 68 IN | BODY MASS INDEX: 30.46 KG/M2 | HEART RATE: 96 BPM

## 2024-07-19 DIAGNOSIS — F41.1 GAD (GENERALIZED ANXIETY DISORDER): ICD-10-CM

## 2024-07-19 DIAGNOSIS — O99.210 OBESITY AFFECTING PREGNANCY, ANTEPARTUM, UNSPECIFIED OBESITY TYPE: ICD-10-CM

## 2024-07-19 DIAGNOSIS — J45.20 MILD INTERMITTENT ASTHMA WITHOUT COMPLICATION: ICD-10-CM

## 2024-07-19 DIAGNOSIS — Z34.00 SUPERVISION OF NORMAL FIRST PREGNANCY, ANTEPARTUM: Primary | ICD-10-CM

## 2024-07-19 PROCEDURE — 99207 PR PRENATAL VISIT: CPT | Performed by: FAMILY MEDICINE

## 2024-07-19 ASSESSMENT — PAIN SCALES - GENERAL: PAINLEVEL: NO PAIN (0)

## 2024-08-16 ENCOUNTER — PRENATAL OFFICE VISIT (OUTPATIENT)
Dept: FAMILY MEDICINE | Facility: CLINIC | Age: 29
End: 2024-08-16
Payer: COMMERCIAL

## 2024-08-16 VITALS
HEIGHT: 68 IN | SYSTOLIC BLOOD PRESSURE: 106 MMHG | RESPIRATION RATE: 16 BRPM | OXYGEN SATURATION: 99 % | TEMPERATURE: 97.8 F | DIASTOLIC BLOOD PRESSURE: 72 MMHG | HEART RATE: 98 BPM | WEIGHT: 205.1 LBS | BODY MASS INDEX: 31.08 KG/M2

## 2024-08-16 DIAGNOSIS — G25.81 RESTLESS LEG SYNDROME: ICD-10-CM

## 2024-08-16 DIAGNOSIS — O99.210 OBESITY AFFECTING PREGNANCY, ANTEPARTUM, UNSPECIFIED OBESITY TYPE: ICD-10-CM

## 2024-08-16 DIAGNOSIS — F41.1 GAD (GENERALIZED ANXIETY DISORDER): ICD-10-CM

## 2024-08-16 DIAGNOSIS — J45.20 MILD INTERMITTENT ASTHMA WITHOUT COMPLICATION: ICD-10-CM

## 2024-08-16 DIAGNOSIS — Z34.00 SUPERVISION OF NORMAL FIRST PREGNANCY, ANTEPARTUM: Primary | ICD-10-CM

## 2024-08-16 LAB
ERYTHROCYTE [DISTWIDTH] IN BLOOD BY AUTOMATED COUNT: 12.8 % (ref 10–15)
FERRITIN SERPL-MCNC: 50 NG/ML (ref 6–175)
GLUCOSE 1H P 50 G GLC PO SERPL-MCNC: 99 MG/DL (ref 70–129)
HCT VFR BLD AUTO: 37.7 % (ref 35–47)
HGB BLD-MCNC: 12.6 G/DL (ref 11.7–15.7)
IRON BINDING CAPACITY (ROCHE): 304 UG/DL (ref 240–430)
IRON SATN MFR SERPL: 29 % (ref 15–46)
IRON SERPL-MCNC: 87 UG/DL (ref 37–145)
MAGNESIUM SERPL-MCNC: 1.8 MG/DL (ref 1.7–2.3)
MCH RBC QN AUTO: 30 PG (ref 26.5–33)
MCHC RBC AUTO-ENTMCNC: 33.4 G/DL (ref 31.5–36.5)
MCV RBC AUTO: 90 FL (ref 78–100)
PLATELET # BLD AUTO: 204 10E3/UL (ref 150–450)
RBC # BLD AUTO: 4.2 10E6/UL (ref 3.8–5.2)
T PALLIDUM AB SER QL: NONREACTIVE
WBC # BLD AUTO: 7.2 10E3/UL (ref 4–11)

## 2024-08-16 PROCEDURE — 83550 IRON BINDING TEST: CPT | Performed by: FAMILY MEDICINE

## 2024-08-16 PROCEDURE — 99207 PR PRENATAL VISIT: CPT | Performed by: FAMILY MEDICINE

## 2024-08-16 PROCEDURE — 83540 ASSAY OF IRON: CPT | Performed by: FAMILY MEDICINE

## 2024-08-16 PROCEDURE — 82728 ASSAY OF FERRITIN: CPT | Performed by: FAMILY MEDICINE

## 2024-08-16 PROCEDURE — 85027 COMPLETE CBC AUTOMATED: CPT | Performed by: FAMILY MEDICINE

## 2024-08-16 PROCEDURE — 36415 COLL VENOUS BLD VENIPUNCTURE: CPT | Performed by: FAMILY MEDICINE

## 2024-08-16 PROCEDURE — 82950 GLUCOSE TEST: CPT | Performed by: FAMILY MEDICINE

## 2024-08-16 PROCEDURE — 86780 TREPONEMA PALLIDUM: CPT | Performed by: FAMILY MEDICINE

## 2024-08-16 PROCEDURE — 83735 ASSAY OF MAGNESIUM: CPT | Performed by: FAMILY MEDICINE

## 2024-08-16 NOTE — PROGRESS NOTES
"Clinic Note - Return OB Visit    Christa Erickson is a 28 year old  female who presents to clinic for a follow up OB visit. She is currently 26w6d with an estimated date of delivery of 2024 via LMP c/w 1st tri . She denies headache, chest pain, shortness of breath, abdominal pain/contractions, vaginal bleeding, or abnormal discharge. She has felt baby move.     New concerns today:   -restless legs at night, makes sleep hard    OB History    Para Term  AB Living   1 0 0 0 0 0   SAB IAB Ectopic Multiple Live Births   0 0 0 0 0      # Outcome Date GA Lbr Alphonse/2nd Weight Sex Type Anes PTL Lv   1 Current                Physical exam:  /72   Pulse 98   Temp 97.8  F (36.6  C) (Temporal)   Resp 16   Ht 1.727 m (5' 8\")   Wt 93 kg (205 lb 1.6 oz)   LMP 02/10/2024 (Exact Date)   SpO2 99%   BMI 31.19 kg/m      General: alert, female in no acute distress  Abdomen: gravid uterus at 28 cm, FHTs 140  Extremities: no edema    Supervision of normal first pregnancy, antepartum  G1 at 26+6 weeks today.  Pregnancy complicated by obesity, anxiety on SSRI, asthma. 1 hr GCT, CBC and syphilis today.    Obesity affecting pregnancy, antepartum, unspecified obesity type  Pregravid BMI 30.26 - gained 6 lbs thus far.  Discussed goal weight gain less than 20 pounds during pregnancy.    Mild intermittent asthma without complication  Well-controlled at this time.    OPAL (generalized anxiety disorder)  Doing well on Celexa 20 mg daily.    Restless legs  Will check magnesium and iron levels today.      Reviewed pre-edwin labs: O pos, myriad nml, girl    Follow up in 2 weeks for routine prenatal visit - with Britton Avalos MD  [facility]     "

## 2024-08-16 NOTE — PATIENT INSTRUCTIONS
Phone Numbers:  [facility]: [phone]  [facility]: [phone]     When to call or come in to the hospital  If you notice a decrease in your baby's movement.   If your begin to experience contractions that are 5 minutes or less apart and lasting for over 45 seconds, or if contractions are becoming more painful.  If you have any bleeding or leakage of fluids.   If you have a headache not resolved with tylenol, right upper abdominal pain, or sudden onset of swelling.  You know your body best. Never hesitate to call or go to the hospital if something doesn't feel right!    [place] parenting classes [email or web address]  [place] parenting classes [email or web address]  Free online classes through [employer]    Blood Glucose Screening During Pregnancy   Gestational diabetes is diabetes that only pregnant women get. Changes in your body during pregnancy can cause high blood sugar (glucose). This can cause problems for you and your baby. It is a serious condition, but it can be controlled.  What to Know If You Test Positive   Gestational diabetes is treatable. The best way to control gestational diabetes is to find out you have it as early as possible and start treatment quickly.   Gestational diabetes can cause problems for the mother during pregnancy. It can also cause problems with the baby during pregnancy, delivery, and after. Treatment greatly lowers the chance for problems.   The changes in your body that cause gestational diabetes normally occur only when you are pregnant. After the baby is born, your body goes back to normal and the condition goes away. You may be more likely to have type 2 diabetes later, though. So talk to your doctor about ways to help prevent type 2 diabetes.  Treating Gestational Diabetes   You ll need to check your blood sugar regularly. You can do this at home by pricking your finger and  checking a drop of blood on a glucose monitor. Your health care provider will show you how and when to check your blood sugar and discuss your target blood sugar level.   To manage your blood sugar, you will be given a special plan. It will likely involve planning your meals and getting regular exercise. Some women need to take a hormone called insulin, or an oral hypoglycemic medication to help control their blood sugar.    Making Plans for Feeding Your Baby  By this point, you probably have read a lot about feeding your baby. Breastfeed or formula? Each mother s decision is her own and we respect you and your choices. We ve gathered information on both breastfeeding and formula feeding to help with your decision. Talking with your physician can help in your decision.     Skin-to-skin contact  Being close to mom helps your baby adjust to life outside of the womb. It helps your baby regulate their body temperature, heart rate, and breathing. Your baby will usually be placed skin-to-skin immediately following birth or as soon as possible, if medical intervention is needed.    Rooming-In  Having your baby stay with you in your room is called  rooming-in.  Keeping your baby in your room helps you to learn how to care for your baby by getting to know your baby s cues, body rhythms and sleep cycle.       Cue-based feeding  Cues (signals) are baby s way of telling you what he or she wants. When you learn your infant s cues, you know how to care for and feed your baby. Feeding cues are the licking and smacking of lips, bringing their fist to their mouth, and a reflex called  rooting - where baby turns and opens his or her mouth, searching for the breast or bottle. Crying is a late feeding cue. Babies can feed frequently, often at least 8 times in 24 hours.  Breastfeeding facts  Breast milk is the best source of nutrition for your baby and is available at birth. In the first couple of days, your milk volume is already  starting to increase, though it may not be noticeable. Breastfeeding frequently to increase your milk supply. Within three to five days, you will begin to notice larger milk volumes. An increase in breast size, heaviness and firmness are often described as the milk  coming in.  Frequent breastfeeding can help breasts from getting overly firm and painful. You will know the baby is getting enough milk if your baby is having wet and dirty diapers and gaining weight.   Positioning and attachment   Get comfortable. Use pillows as needed to support your arms and baby. Hold baby close at the level of your breast, facing you in a tummy to tummy position. Skin to skin helps with this. Position the baby with his or her nose by the nipple. There should be a straight line from baby s ear to shoulder to hips.  Tickle your baby s lips or wait for baby to open mouth wide, bring baby to breast by leading with the chin. Aim the nipple at the roof of baby s mouth. A rapid sucking pattern is followed by longer, drawing pattern with occasional swallows heard. When baby is correctly latched, your nipple and much of the areola are pulled well into baby s mouth.      Returning to work or school  Focus on a good start to breastfeeding. Many women continue to provide breastmilk for their baby when they return to work or school. Making plans about where to pump and store milk can make the transition go well. Talk with other mothers who have also returned to work or school for tips and support. Your employer s Human Resource department may be a resource as well.      babies can mean fewer  sick  days for you.  A quality breast pump will also save time and add comfort. Check with your insurance prior to giving birth for breast pump coverage. Many insurance companies include a pump within your benefits.  Breastfeed when you are with your baby. Reserve your bottles of breast milk for when you are away.   Your breasts will need to be   emptied  either by your baby or a pump. Plan to pump at least twice in an eight hour day.  If you cannot pump at work, continue breastfeeding at home. Any amount of breast milk is worth giving to your baby.    Formula feeding facts  If you are planning to use formula to feed your baby, you will want to make some preparations ahead of time. Talk to your doctor about what type of formula to use. Some are iron-fortified, meaning they have extra iron in them. You will want to purchase formula and bottles before your baby is born to be sure you are ready after you return from the hospital. The hospitals do not provide formula samples to take home.    Be sure to follow formula mixing directions closely. Regular milk in the dairy case at the grocery store should not be given to babies under 1 year old. Baby formula is sold in several forms including:  Ready-to-use. This is the most expensive, but no mixing is necessary.  Concentrated liquid. This is less expensive than ready-to-use and you mix with water.  Powder. This is the least expensive. You mix one level scoop of powdered formula with two ounces of water and stir well.    How do I warm my baby s bottles?  You may feed your baby a bottle without warming it first. It is OK for the breast milk or formula to be cool or room temperature. If your baby seems to prefer it warmed, you can put the filled bottle in a container of warm water and let it stand for a few minutes. Check the temperature of the liquid on your skin before feeding it to your baby; to be sure it isn t too hot. Do not heat bottles in the microwave. Microwaves heat food and liquids unevenly, and this can cause hot spots that can burn your baby.    How do I clean and sterilize bottles?  Sterilize bottles and nipples before you use them for the first time. You can do this by putting them in boiling water for 5 minutes. After that first time, you can wash them in hot and soapy water. Rinse them carefully to  be sure there is no soap left on them. You can also wash them in the .

## 2024-09-04 ENCOUNTER — PRENATAL OFFICE VISIT (OUTPATIENT)
Dept: FAMILY MEDICINE | Facility: CLINIC | Age: 29
End: 2024-09-04
Payer: COMMERCIAL

## 2024-09-04 VITALS
HEART RATE: 86 BPM | BODY MASS INDEX: 31.85 KG/M2 | DIASTOLIC BLOOD PRESSURE: 74 MMHG | WEIGHT: 209.5 LBS | TEMPERATURE: 97.8 F | SYSTOLIC BLOOD PRESSURE: 111 MMHG | RESPIRATION RATE: 20 BRPM | OXYGEN SATURATION: 97 %

## 2024-09-04 DIAGNOSIS — O99.210 OBESITY AFFECTING PREGNANCY, ANTEPARTUM, UNSPECIFIED OBESITY TYPE: ICD-10-CM

## 2024-09-04 DIAGNOSIS — Z34.00 SUPERVISION OF NORMAL FIRST PREGNANCY, ANTEPARTUM: Primary | ICD-10-CM

## 2024-09-04 DIAGNOSIS — J45.20 MILD INTERMITTENT ASTHMA WITHOUT COMPLICATION: ICD-10-CM

## 2024-09-04 DIAGNOSIS — F41.1 GAD (GENERALIZED ANXIETY DISORDER): ICD-10-CM

## 2024-09-04 PROCEDURE — 90715 TDAP VACCINE 7 YRS/> IM: CPT | Performed by: FAMILY MEDICINE

## 2024-09-04 PROCEDURE — 99207 PR PRENATAL VISIT: CPT | Performed by: FAMILY MEDICINE

## 2024-09-04 PROCEDURE — 90471 IMMUNIZATION ADMIN: CPT | Performed by: FAMILY MEDICINE

## 2024-09-04 PROCEDURE — 90656 IIV3 VACC NO PRSV 0.5 ML IM: CPT | Performed by: FAMILY MEDICINE

## 2024-09-04 PROCEDURE — 90472 IMMUNIZATION ADMIN EACH ADD: CPT | Performed by: FAMILY MEDICINE

## 2024-09-04 ASSESSMENT — ASTHMA QUESTIONNAIRES
QUESTION_4 LAST FOUR WEEKS HOW OFTEN HAVE YOU USED YOUR RESCUE INHALER OR NEBULIZER MEDICATION (SUCH AS ALBUTEROL): ONCE A WEEK OR LESS
QUESTION_2 LAST FOUR WEEKS HOW OFTEN HAVE YOU HAD SHORTNESS OF BREATH: ONCE OR TWICE A WEEK
QUESTION_5 LAST FOUR WEEKS HOW WOULD YOU RATE YOUR ASTHMA CONTROL: WELL CONTROLLED
ACT_TOTALSCORE: 22
QUESTION_1 LAST FOUR WEEKS HOW MUCH OF THE TIME DID YOUR ASTHMA KEEP YOU FROM GETTING AS MUCH DONE AT WORK, SCHOOL OR AT HOME: NONE OF THE TIME
QUESTION_3 LAST FOUR WEEKS HOW OFTEN DID YOUR ASTHMA SYMPTOMS (WHEEZING, COUGHING, SHORTNESS OF BREATH, CHEST TIGHTNESS OR PAIN) WAKE YOU UP AT NIGHT OR EARLIER THAN USUAL IN THE MORNING: NOT AT ALL
ACT_TOTALSCORE: 22

## 2024-09-04 NOTE — PATIENT INSTRUCTIONS
Phone Numbers:  [facility]: [phone]  [facility]: [phone]     When to call or come in to the hospital  If you notice a decrease in your baby's movement.   If your begin to experience contractions that are 5 minutes or less apart and lasting for over 45 seconds, or if contractions are becoming more painful.  If you have any bleeding or leakage of fluids.   If you have a headache not resolved with tylenol, right upper abdominal pain, or sudden onset of swelling.  You know your body best. Never hesitate to call or go to the hospital if something doesn't feel right!    [employer] parenting classes [email or web address]  [place] parenting classes [email or web address]  Free online classes through [employer]    Blood Glucose Screening During Pregnancy   Gestational diabetes is diabetes that only pregnant women get. Changes in your body during pregnancy can cause high blood sugar (glucose). This can cause problems for you and your baby. It is a serious condition, but it can be controlled.  What to Know If You Test Positive   Gestational diabetes is treatable. The best way to control gestational diabetes is to find out you have it as early as possible and start treatment quickly.   Gestational diabetes can cause problems for the mother during pregnancy. It can also cause problems with the baby during pregnancy, delivery, and after. Treatment greatly lowers the chance for problems.   The changes in your body that cause gestational diabetes normally occur only when you are pregnant. After the baby is born, your body goes back to normal and the condition goes away. You may be more likely to have type 2 diabetes later, though. So talk to your doctor about ways to help prevent type 2 diabetes.  Treating Gestational Diabetes   You ll need to check your blood sugar regularly. You can do this at home by pricking your finger and  checking a drop of blood on a glucose monitor. Your health care provider will show you how and when to check your blood sugar and discuss your target blood sugar level.   To manage your blood sugar, you will be given a special plan. It will likely involve planning your meals and getting regular exercise. Some women need to take a hormone called insulin, or an oral hypoglycemic medication to help control their blood sugar.    Making Plans for Feeding Your Baby  By this point, you probably have read a lot about feeding your baby. Breastfeed or formula? Each mother s decision is her own and we respect you and your choices. We ve gathered information on both breastfeeding and formula feeding to help with your decision. Talking with your physician can help in your decision.     Skin-to-skin contact  Being close to mom helps your baby adjust to life outside of the womb. It helps your baby regulate their body temperature, heart rate, and breathing. Your baby will usually be placed skin-to-skin immediately following birth or as soon as possible, if medical intervention is needed.    Rooming-In  Having your baby stay with you in your room is called  rooming-in.  Keeping your baby in your room helps you to learn how to care for your baby by getting to know your baby s cues, body rhythms and sleep cycle.       Cue-based feeding  Cues (signals) are baby s way of telling you what he or she wants. When you learn your infant s cues, you know how to care for and feed your baby. Feeding cues are the licking and smacking of lips, bringing their fist to their mouth, and a reflex called  rooting - where baby turns and opens his or her mouth, searching for the breast or bottle. Crying is a late feeding cue. Babies can feed frequently, often at least 8 times in 24 hours.  Breastfeeding facts  Breast milk is the best source of nutrition for your baby and is available at birth. In the first couple of days, your milk volume is already  starting to increase, though it may not be noticeable. Breastfeeding frequently to increase your milk supply. Within three to five days, you will begin to notice larger milk volumes. An increase in breast size, heaviness and firmness are often described as the milk  coming in.  Frequent breastfeeding can help breasts from getting overly firm and painful. You will know the baby is getting enough milk if your baby is having wet and dirty diapers and gaining weight.   Positioning and attachment   Get comfortable. Use pillows as needed to support your arms and baby. Hold baby close at the level of your breast, facing you in a tummy to tummy position. Skin to skin helps with this. Position the baby with his or her nose by the nipple. There should be a straight line from baby s ear to shoulder to hips.  Tickle your baby s lips or wait for baby to open mouth wide, bring baby to breast by leading with the chin. Aim the nipple at the roof of baby s mouth. A rapid sucking pattern is followed by longer, drawing pattern with occasional swallows heard. When baby is correctly latched, your nipple and much of the areola are pulled well into baby s mouth.      Returning to work or school  Focus on a good start to breastfeeding. Many women continue to provide breastmilk for their baby when they return to work or school. Making plans about where to pump and store milk can make the transition go well. Talk with other mothers who have also returned to work or school for tips and support. Your employer s Human Resource department may be a resource as well.      babies can mean fewer  sick  days for you.  A quality breast pump will also save time and add comfort. Check with your insurance prior to giving birth for breast pump coverage. Many insurance companies include a pump within your benefits.  Breastfeed when you are with your baby. Reserve your bottles of breast milk for when you are away.   Your breasts will need to be   emptied  either by your baby or a pump. Plan to pump at least twice in an eight hour day.  If you cannot pump at work, continue breastfeeding at home. Any amount of breast milk is worth giving to your baby.    Formula feeding facts  If you are planning to use formula to feed your baby, you will want to make some preparations ahead of time. Talk to your doctor about what type of formula to use. Some are iron-fortified, meaning they have extra iron in them. You will want to purchase formula and bottles before your baby is born to be sure you are ready after you return from the hospital. The hospitals do not provide formula samples to take home.    Be sure to follow formula mixing directions closely. Regular milk in the dairy case at the grocery store should not be given to babies under 1 year old. Baby formula is sold in several forms including:  Ready-to-use. This is the most expensive, but no mixing is necessary.  Concentrated liquid. This is less expensive than ready-to-use and you mix with water.  Powder. This is the least expensive. You mix one level scoop of powdered formula with two ounces of water and stir well.    How do I warm my baby s bottles?  You may feed your baby a bottle without warming it first. It is OK for the breast milk or formula to be cool or room temperature. If your baby seems to prefer it warmed, you can put the filled bottle in a container of warm water and let it stand for a few minutes. Check the temperature of the liquid on your skin before feeding it to your baby; to be sure it isn t too hot. Do not heat bottles in the microwave. Microwaves heat food and liquids unevenly, and this can cause hot spots that can burn your baby.    How do I clean and sterilize bottles?  Sterilize bottles and nipples before you use them for the first time. You can do this by putting them in boiling water for 5 minutes. After that first time, you can wash them in hot and soapy water. Rinse them carefully to  be sure there is no soap left on them. You can also wash them in the .

## 2024-09-04 NOTE — PROGRESS NOTES
Clinic Note - Return OB Visit    Christa Erickson is a 28 year old  female who presents to clinic for a follow up OB visit. She is currently 29w4d with an estimated date of delivery of 2024 via LMP c/w 1st [facility]. She denies headache, chest pain, shortness of breath, abdominal pain/contractions, vaginal bleeding, or abnormal discharge. She has felt baby move.     New concerns today:   -doing well     OB History    Para Term  AB Living   1 0 0 0 0 0   SAB IAB Ectopic Multiple Live Births   0 0 0 0 0      # Outcome Date GA Lbr Alphonse/2nd Weight Sex Type Anes PTL Lv   1 Current                Physical exam:  /74 (BP Location: Left arm, Patient Position: Sitting, Cuff Size: Adult Regular)   Pulse 86   Temp 97.8  F (36.6  C) (Oral)   Resp 20   Wt 95 kg (209 lb 8 oz)   LMP 02/10/2024 (Exact Date)   SpO2 97%   BMI 31.85 kg/m      General: alert, female in no acute distress  Abdomen: gravid uterus at 30 cm, FHTs 145  Extremities: no edema    Supervision of normal first pregnancy, antepartum  G1 at 29+4 weeks today.  Pregnancy complicated by obesity, anxiety on SSRI, asthma. Tdap and flu shot administered today.     Obesity affecting pregnancy, antepartum, unspecified obesity type  Pregravid BMI 30.26 - gained 10 lbs thus far.  Discussed goal weight gain less than 20 pounds during pregnancy.    Mild intermittent asthma without complication  Well-controlled at this time.    OPAL (generalized anxiety disorder)  Doing well on Celexa 20 mg daily.      Reviewed pre- labs: O pos, myriad nml, girl    Follow up in 2 weeks for routine prenatal visit    Emily Avalos MD  [facility]

## 2024-09-13 ENCOUNTER — MYC MEDICAL ADVICE (OUTPATIENT)
Dept: FAMILY MEDICINE | Facility: CLINIC | Age: 29
End: 2024-09-13
Payer: COMMERCIAL

## 2024-09-13 ENCOUNTER — NURSE TRIAGE (OUTPATIENT)
Dept: FAMILY MEDICINE | Facility: CLINIC | Age: 29
End: 2024-09-13
Payer: COMMERCIAL

## 2024-09-13 ASSESSMENT — ASTHMA QUESTIONNAIRES
QUESTION_5 LAST FOUR WEEKS HOW WOULD YOU RATE YOUR ASTHMA CONTROL: WELL CONTROLLED
QUESTION_2 LAST FOUR WEEKS HOW OFTEN HAVE YOU HAD SHORTNESS OF BREATH: ONCE OR TWICE A WEEK
ACT_TOTALSCORE: 21
QUESTION_4 LAST FOUR WEEKS HOW OFTEN HAVE YOU USED YOUR RESCUE INHALER OR NEBULIZER MEDICATION (SUCH AS ALBUTEROL): ONCE A WEEK OR LESS
ACT_TOTALSCORE: 21
QUESTION_1 LAST FOUR WEEKS HOW MUCH OF THE TIME DID YOUR ASTHMA KEEP YOU FROM GETTING AS MUCH DONE AT WORK, SCHOOL OR AT HOME: A LITTLE OF THE TIME
QUESTION_3 LAST FOUR WEEKS HOW OFTEN DID YOUR ASTHMA SYMPTOMS (WHEEZING, COUGHING, SHORTNESS OF BREATH, CHEST TIGHTNESS OR PAIN) WAKE YOU UP AT NIGHT OR EARLIER THAN USUAL IN THE MORNING: NOT AT ALL

## 2024-09-13 NOTE — TELEPHONE ENCOUNTER
When did fall occur? Typically if fall/injury was concerning we will consider monitoring pt at L&D for 4 hours - primarily looking for contractions/abdominal pain and vaginal bleeding. Pt's fall does not sound very serious and if she is having no abd pain or vaginal bleeding and baby is moving then fine to continue home monitoring    Dr Avalos

## 2024-09-13 NOTE — TELEPHONE ENCOUNTER
See PCP's response to nurse triage on 9/13/24.    Writer called the patient and relayed the above PCP's message to the patient.    Provider Recommendation Follow Up:   Reached patient/caregiver. Informed of provider's recommendations. Patient verbalized understanding and agrees with the plan.          Patient reports that she fell around 8 am this morning.    Denies abdominal pain, contractions, or vaginal bleeding at this time and baby is moving well.    Denies other questions and concerns at this time.    Writer will route the above information to the PCP to review.    Gin Cordon RN, BSN  [facility]

## 2024-09-13 NOTE — TELEPHONE ENCOUNTER
Nurse Triage SBAR    Is this a 2nd Level Triage? YES, LICENSED PRACTITIONER REVIEW IS REQUIRED    Situation: Patient Sent dianahart with concern of a fall.     Background: currently 30 weeks pregnant     Assessment: Patient stated the ground was wet and slipped and fell and landed on her knees and hands but did not land on her stomach. Patient stated there is no injury or cuts, patient denies any pain and still feels fetal movement. Patient denies any bleeding.     Protocol Recommended Disposition:   Go To LD Now    Recommendation: Patient was advised that may need to go to L& D for evaluation but wants to know if she is ok to wait. Patient was advised that this will be sent to Dr. Avalos for evaluation.      Routed to provider    Does the patient meet one of the following criteria for ADS visit consideration? 16+ years old, with an [facility] PCP     Moses Oliva RN  [facility]       TIP  Providers, please consider if this condition is appropriate for management at one of our Acute and Diagnostic Services sites.     If patient is a good candidate, please use dotphrase <dot>triageresponse and select Refer to ADS to document.      Reason for Disposition   Pregnant 20 or more weeks and fall to ground or floor (e.g., walking and tripped)    Additional Information   Negative: Major injury from dangerous force (e.g., fall > 10 feet or 3 meters)   Negative: Major bleeding (actively dripping or spurting) that can't be stopped   Negative: Shock suspected (e.g., cold/pale/clammy skin, too weak to stand, low BP, rapid pulse)   Negative: SEVERE abdominal pain   Negative: Vaginal bleeding and pregnant 20 or more weeks   Negative: Sounds like a life-threatening emergency to the triager   Negative: Abdomen injury is main concern   Negative: Patient has an injury to a specific part of the body (e.g., arm, leg, head)   Negative: Patient has a wound (abrasion, cut, puncture)   Negative: Vaginal bleeding and pregnant <  "20 weeks (Exception: Single episode of spotting.)   Negative: Abdominal pain (not severe) and pregnant < 20 weeks   Negative: Abdominal pain (not severe) and present > 1 hour   Negative: Sounds like a serious injury to the triager    Answer Assessment - Initial Assessment Questions  1. MECHANISM: \"How did the fall happen?\"      Wet and slipped and fell forward.   2. DOMESTIC VIOLENCE SCREENING: \"Did you fall because someone pushed you or tried to hurt you?\"      Nothing like that  3. ONSET: \"When did the fall happen?\" (e.g., minutes, hours, or days ago)      On 9/13 around 8:30 am   4. LOCATION: \"What part of the body hit the ground?\" (e.g., back, buttocks, head, hips, knees, hands, head, stomach)      Only hands and knees hit the ground  5. INJURY: \"Did you get hurt when you fell? Are there any obvious injuries?\" If Yes, ask: \"What does the injury look like?\"      No obvious injuries   6. PAIN: \"Is there any pain?\" If Yes, ask: \"How bad is the pain?\" (e.g., Scale 1-10; or mild,   moderate, severe)    - NONE (0): No pain.    - MILD (1-3): Doesn't interfere with normal activities.     - MODERATE (4-7): Interferes with normal activities or awakens from sleep     - SEVERE (8-10): Excruciating pain, unable to do any normal activities.       0/10  7. SIZE: For cuts, bruises, or swelling, ask: \"How large is it?\" (e.g., inches or centimeters)      nothing  8. SHEILA: \"What date are you expecting to deliver?\"      11/16/24  9. FETAL MOVEMENT: \"Has the baby's movement decreased or changed significantly from   normal?\"      Still a little, still normal   10. TETANUS: For any breaks in the skin, ask: \"When was the last tetanus booster?\"        Up to date   11. OTHER SYMPTOMS: \"Do you have any other symptoms?\" (e.g., abdomen pain, contractions, leaking of fluid from vagina or concerned water broke, vaginal bleeding, )        None    Protocols used: Pregnancy - Fall-A-OH    "

## 2024-09-13 NOTE — TELEPHONE ENCOUNTER
Moses Oliva RN called Christa on 9/13 and left a message to return call to clinic. If patient calls back, please route to [facility].     TC please route to RN to triage.     Moses Oliva RN  [facility]

## 2024-09-16 NOTE — PATIENT INSTRUCTIONS
Phone Numbers:  [facility]: [phone]  [facility]: [phone]     When to call or come in to the hospital   If you notice a decrease in your baby's movement.   If your begin to experience contractions that are 5 minutes or less apart and lasting for over 45 seconds, or if contractions are becoming more painful.  If you have any bleeding or leakage of fluids.   If you have a headache not resolved with tylenol, right upper abdominal pain, or sudden onset of swelling.  You know your body best. Never hesitate to call or go to the hospital if something doesn't feel right!    After-baby Birth Control Methods   It is important to consider contraception after your baby is born if you would like to prevent a pregnancy in the near future. If you are breast feeding, talk with your doctor to determine the best method for you. It is recommended that you wait 12 months after the birth of your baby to get pregnant again.   Natural Family Planning  It is possible to avoid pregnancy or time them based on your family goals without any hormones or medications or need for condoms. In order to be successful with this method, both partners need to be diligent in tracking fertility/ovulation and abiding by abstinence during certain times of the month to avoid pregnancy.  Condoms   Latex condoms can prevent pregnancy and STDs. Condoms work best when used with spermicide that is placed inside the vagina as well as inside the condom. Use only water-based lubricants. Petroleum based products (such as Vaseline and many massage oils) can weaken the latex and cause it to break.   IUD   IUD's are made of flexible plastic and must be inserted into your uterus by a doctor. The IUD works by stopping the fertilized egg from attaching itself to the uterus. IUDs may increase the risk of getting a pelvic infection (PID), which can lead to infertility if not diagnosed and treated early. This is a great option after delivery of your baby! These  last for 3, 5, or 10 years depending up on which type you choose, but can be removed earlier if you decide you would like to get pregnant sooner.  Tubal Ligation & Vasectomy   These are good choices for women and men who know that they do not want to have any more children.     HORMONES   Birth control pills, hormone implants and shots work by stopping ovulation (release of the egg from the ovary). Implants are placed in the upper arm by a minor surgical incision. They last for three years, but can be removed by your doctor if you decide to get pregnant. Hormone injections must be repeated every three months. The Pill must be taken every day.   All hormones can have side effects and create certain health risks. They are very effective in preventing pregnancy but they do not prevent STDs. You can talk more about the risks and benefits with your doctor.     Controlling Back Pain  As your body changes during pregnancy, your back must work in new ways. Back pain is due to many causes. Physical changes in your body can strain your back and its supporting muscles. Also, hormones (chemicals that carry messages throughout the body) increase during pregnancy. This can affect how the muscles and joints work together. All of these changes can lead to pain in the upper or lower back or pelvis. Some pregnant women have sciatica, pain caused by pressure on the sciatic nerve running down the back of the leg. Ask your healthcare provider for specific tips and exercises to help control your back pain.    Tips to Help You Rest  Good rest and sleep will help you feel better. Here are some ideas:  Ask your partner to massage your shoulders, neck, or back.  Limit the errands you do each day.  Lie down in the afternoon or after work for a few minutes.  Take a warm bath before you go to sleep.  Drink warm milk or teas without caffeine.  Avoid coffee, black tea, and cola.    Preventing Heartburn  Avoid spicy or acidic foods.   Eat small  amounts more often.  Wait 2 hours after eating before lying down   Sleep with your upper body raised 6 inches.  May use Tums as needed. Talk to your doctor about other medications to try.     [place] parenting classes [email or web address]  [place] parenting classes [email or web address]-information  Free online classes through [employer]

## 2024-09-16 NOTE — PROGRESS NOTES
"Clinic Note - Return OB Visit    Christa Erickson is a 28 year old  female who presents to clinic for a follow up OB visit. She is currently 31w4d with an estimated date of delivery of 2024 via LMP c/w 1st [facility]. She denies headache, chest pain, shortness of breath, abdominal pain/contractions, vaginal bleeding, or abnormal discharge. She has felt baby move.     New concerns today:   -itchy on belly and bottom of feet, more at night, no rash    OB History    Para Term  AB Living   1 0 0 0 0 0   SAB IAB Ectopic Multiple Live Births   0 0 0 0 0      # Outcome Date GA Lbr Alphonse/2nd Weight Sex Type Anes PTL Lv   1 Current                Physical exam:  /76 (BP Location: Right arm, Patient Position: Sitting, Cuff Size: Adult Regular)   Pulse 86   Temp 98.3  F (36.8  C) (Temporal)   Resp 22   Ht 1.715 m (5' 7.52\")   Wt 94.2 kg (207 lb 9.6 oz)   LMP 02/10/2024 (Exact Date)   SpO2 100%   BMI 32.02 kg/m      General: alert, female in no acute distress  Abdomen: gravid uterus at 34 cm, FHTs 145 - vertex on handheld US  Extremities: no edema    Supervision of normal first pregnancy, antepartum  G1 at 31+4 weeks today.  Pregnancy complicated by obesity, anxiety on SSRI, asthma. Tdap and flu shot given. COVID booster given today.    Obesity affecting pregnancy, antepartum, unspecified obesity type  Pregravid BMI 30.26 - gained 8 lbs thus far.  Discussed goal weight gain less than 20 pounds during pregnancy.    Mild intermittent asthma without complication  Well-controlled at this time.    OPAL (generalized anxiety disorder)  Doing well on Celexa 20 mg daily.    Itching   Pt reports itching concerning for possible cholestasis - will check bile acids today.     Reviewed pre-edwin labs: O pos, myriad nml, girl    Follow up in 2 weeks for routine prenatal visit    Emily Avalos MD  [facility]     "

## 2024-09-18 ENCOUNTER — PRENATAL OFFICE VISIT (OUTPATIENT)
Dept: FAMILY MEDICINE | Facility: CLINIC | Age: 29
End: 2024-09-18
Payer: COMMERCIAL

## 2024-09-18 VITALS
SYSTOLIC BLOOD PRESSURE: 114 MMHG | DIASTOLIC BLOOD PRESSURE: 76 MMHG | RESPIRATION RATE: 22 BRPM | WEIGHT: 207.6 LBS | BODY MASS INDEX: 31.46 KG/M2 | TEMPERATURE: 98.3 F | HEIGHT: 68 IN | OXYGEN SATURATION: 100 % | HEART RATE: 86 BPM

## 2024-09-18 DIAGNOSIS — F41.1 GAD (GENERALIZED ANXIETY DISORDER): ICD-10-CM

## 2024-09-18 DIAGNOSIS — L29.9 ITCHING: ICD-10-CM

## 2024-09-18 DIAGNOSIS — Z34.00 SUPERVISION OF NORMAL FIRST PREGNANCY, ANTEPARTUM: Primary | ICD-10-CM

## 2024-09-18 DIAGNOSIS — J45.20 MILD INTERMITTENT ASTHMA WITHOUT COMPLICATION: ICD-10-CM

## 2024-09-18 DIAGNOSIS — O99.210 OBESITY AFFECTING PREGNANCY, ANTEPARTUM, UNSPECIFIED OBESITY TYPE: ICD-10-CM

## 2024-09-18 PROCEDURE — 99207 PR PRENATAL VISIT: CPT | Performed by: FAMILY MEDICINE

## 2024-09-18 PROCEDURE — 91320 SARSCV2 VAC 30MCG TRS-SUC IM: CPT | Performed by: FAMILY MEDICINE

## 2024-09-18 PROCEDURE — 36415 COLL VENOUS BLD VENIPUNCTURE: CPT | Performed by: FAMILY MEDICINE

## 2024-09-18 PROCEDURE — 99000 SPECIMEN HANDLING OFFICE-LAB: CPT | Performed by: FAMILY MEDICINE

## 2024-09-18 PROCEDURE — 83789 MASS SPECTROMETRY QUAL/QUAN: CPT | Mod: 90 | Performed by: FAMILY MEDICINE

## 2024-09-18 PROCEDURE — 90480 ADMN SARSCOV2 VAC 1/ONLY CMP: CPT | Performed by: FAMILY MEDICINE

## 2024-09-18 ASSESSMENT — PAIN SCALES - GENERAL: PAINLEVEL: NO PAIN (0)

## 2024-09-23 LAB
BILE AC SERPL-SCNC: 4.5 UMOL/L
CDCAE SERPL-SCNC: 1.9 UMOL/L
CHOLATE SERPL-SCNC: 1.7 UMOL/L
DO-CHOLATE SERPL-SCNC: 0.6 UMOL/L
URSODEOXYCHOLATE SERPL-SCNC: 0.3 UMOL/L

## 2024-09-27 ASSESSMENT — ASTHMA QUESTIONNAIRES
QUESTION_5 LAST FOUR WEEKS HOW WOULD YOU RATE YOUR ASTHMA CONTROL: WELL CONTROLLED
QUESTION_3 LAST FOUR WEEKS HOW OFTEN DID YOUR ASTHMA SYMPTOMS (WHEEZING, COUGHING, SHORTNESS OF BREATH, CHEST TIGHTNESS OR PAIN) WAKE YOU UP AT NIGHT OR EARLIER THAN USUAL IN THE MORNING: NOT AT ALL
QUESTION_2 LAST FOUR WEEKS HOW OFTEN HAVE YOU HAD SHORTNESS OF BREATH: ONCE OR TWICE A WEEK
QUESTION_4 LAST FOUR WEEKS HOW OFTEN HAVE YOU USED YOUR RESCUE INHALER OR NEBULIZER MEDICATION (SUCH AS ALBUTEROL): ONCE A WEEK OR LESS
ACT_TOTALSCORE: 22
QUESTION_1 LAST FOUR WEEKS HOW MUCH OF THE TIME DID YOUR ASTHMA KEEP YOU FROM GETTING AS MUCH DONE AT WORK, SCHOOL OR AT HOME: NONE OF THE TIME
ACT_TOTALSCORE: 22

## 2024-09-30 NOTE — PATIENT INSTRUCTIONS
Phone Numbers:  [facility]: [phone]  [facility]: [phone]     When to call or come in to the hospital  If you notice a decrease in your baby's movement.   If your begin to experience contractions that are 5 minutes or less apart and lasting for over 45 seconds, or if contractions are becoming more painful.  If you have any bleeding or leakage of fluids.   If you have a headache not resolved with tylenol, right upper abdominal pain, or sudden onset of swelling.  You know your body best. Never hesitate to call or go to the hospital if something doesn't feel right!    Preparing for the hospital  You re likely feeling anxious as your child s birth approaches. This is normal. To give yourself some peace of mind, pack a bag 3-4 weeks before your due date. Here is a list of things to remember:  Personal care items like toothbrush, hair brush, lip balm, lotion, shampoo, glasses, contacts  Nightgown, bathrobe, slippers  Several pairs of underwear  Comfortable clothes for you to wear home  Camera with new batteries  Cell phone and   Insurance information and any other paperwork needed for your hospital stay  Clothes for baby to wear home  An infant, rear-facing car seat for bringing home your baby (this is required by law)    Managing Labor Pain   There are many ways to manage pain during labor. It can often be done with no anesthesia or strong pain medications. Talk to your health care provider about any options you would like to explore.     Help from Relaxation  Some of these are learned in special classes. Your health care provider can help you find classes. The hospital has a tub you can use during early labor. These methods may be of help to you:   Breathing techniques   A warm tub between contractions   Massage and therapeutic touch by your support person or labor    Reading materials that are comforting or inspiring   Music that is soothing   Hypnosis   Acupuncture and acupressure   Heat and  cold application    Help From Analgesics   Analgesics are mild medications that reduce pain. They can be used along with some relaxation methods. They can give you pain relief without total loss of feeling. They may lessen the pain of strong contractions. You may feel well enough to nap between contractions. They have little effect on your baby if given early in labor. This may be done by injection or by IV.     Help From Anesthetics   Anesthesia involves blockage of all feeling including pain. It can be given in the form of local anesthesia or general anesthesia.  Anesthesia is a type of medication to prevent pain. It is often used in labor. It may numb only one region of your body. This is called regional anesthesia. Or it may let you sleep during surgery. This is called general anesthesia. This type of medication is given by a trained specialist. When possible, regional anesthesia will be used. This is so you can be awake during your baby s birth.     Regional Anesthesia   Regional anesthesia may be used to numb your lower body for a vaginal or  birth. It does not go into your bloodstream. This means that little or none of it will reach your baby. There are two kinds:   Epidural. This is most often given while you sit up or lie on your side. A needle with a flexible tube (catheter) is put in your lower back. The needle is then removed. The anesthetic is sent through the catheter. A pump may be attached. This gives you a constant level of anesthetic. An epidural often only partly affects muscle control. This means you will still be able to push for a vaginal birth.   Spinal. This is most often given in one dose right before delivery. It acts fast. You may sit up or lie down when it is injected. It may affect muscle control in your lower body. This includes the ability to push.    General Anesthesia   General anesthesia lets you sleep and keeps you free of pain during surgery. It may be used for a .  It may be given as an injection. It may be given as an inhaled gas. Or it may be given as both. Delivery often occurs before the medication has reached the baby.    [place] parenting classes [email or web address]  [place] parenting classes [email or web address]-[email or web address]-information  Free online classes through [employer]

## 2024-09-30 NOTE — PROGRESS NOTES
"Clinic Note - Return OB Visit    Christa Erickson is a 28 year old  female who presents to clinic for a follow up OB visit. She is currently 33w4d with an estimated date of delivery of 2024 via LMP c/w 1st [facility]. She denies headache, chest pain, shortness of breath, abdominal pain/contractions, vaginal bleeding, or abnormal discharge. She has felt baby move.     New concerns today:   -cold this week so feeling tired  -itchiness has improved overall    OB History    Para Term  AB Living   1 0 0 0 0 0   SAB IAB Ectopic Multiple Live Births   0 0 0 0 0      # Outcome Date GA Lbr Alphonse/2nd Weight Sex Type Anes PTL Lv   1 Current                Physical exam:  /70   Pulse 90   Temp 98.2  F (36.8  C) (Temporal)   Resp 16   Ht 1.702 m (5' 7\")   Wt 93.7 kg (206 lb 8 oz)   LMP 02/10/2024 (Exact Date)   SpO2 98%   BMI 32.34 kg/m      General: alert, female in no acute distress  Abdomen: gravid uterus at 34 cm, FHTs 145 - vertex on handheld US  Extremities: no edema    Supervision of normal first pregnancy, antepartum  G1 at 33+4 weeks today.  Pregnancy complicated by obesity, anxiety on SSRI, asthma. Tdap, covid booster and flu shot given. RSV vaccine given today.    Obesity affecting pregnancy, antepartum, unspecified obesity type  Pregravid BMI 30.26 - gained 7 lbs thus far.  Discussed goal weight gain less than 20 pounds during pregnancy.    Mild intermittent asthma without complication  Well-controlled at this time.    OPAL (generalized anxiety disorder)  Doing well on Celexa 20 mg daily.      Reviewed pre-edwin labs: O pos, myriad nml, girl    Follow up in 2 weeks for routine prenatal visit    Emily Avalos MD  [facility]     "

## 2024-10-01 ENCOUNTER — MYC MEDICAL ADVICE (OUTPATIENT)
Dept: FAMILY MEDICINE | Facility: CLINIC | Age: 29
End: 2024-10-01
Payer: COMMERCIAL

## 2024-10-02 ENCOUNTER — PRENATAL OFFICE VISIT (OUTPATIENT)
Dept: FAMILY MEDICINE | Facility: CLINIC | Age: 29
End: 2024-10-02
Payer: COMMERCIAL

## 2024-10-02 VITALS
HEIGHT: 67 IN | HEART RATE: 90 BPM | BODY MASS INDEX: 32.41 KG/M2 | WEIGHT: 206.5 LBS | OXYGEN SATURATION: 98 % | RESPIRATION RATE: 16 BRPM | SYSTOLIC BLOOD PRESSURE: 110 MMHG | DIASTOLIC BLOOD PRESSURE: 70 MMHG | TEMPERATURE: 98.2 F

## 2024-10-02 DIAGNOSIS — Z34.00 SUPERVISION OF NORMAL FIRST PREGNANCY, ANTEPARTUM: Primary | ICD-10-CM

## 2024-10-02 DIAGNOSIS — J45.20 MILD INTERMITTENT ASTHMA WITHOUT COMPLICATION: ICD-10-CM

## 2024-10-02 DIAGNOSIS — O99.210 OBESITY AFFECTING PREGNANCY, ANTEPARTUM, UNSPECIFIED OBESITY TYPE: ICD-10-CM

## 2024-10-02 DIAGNOSIS — F41.1 GAD (GENERALIZED ANXIETY DISORDER): ICD-10-CM

## 2024-10-02 PROCEDURE — 90471 IMMUNIZATION ADMIN: CPT | Performed by: FAMILY MEDICINE

## 2024-10-02 PROCEDURE — 90678 RSV VACC PREF BIVALENT IM: CPT | Performed by: FAMILY MEDICINE

## 2024-10-02 PROCEDURE — 99207 PR PRENATAL VISIT: CPT | Performed by: FAMILY MEDICINE

## 2024-10-02 ASSESSMENT — PAIN SCALES - GENERAL: PAINLEVEL: NO PAIN (0)

## 2024-10-14 NOTE — PATIENT INSTRUCTIONS
Phone Numbers:  [facility]: [phone]  [facility]: [phone]     When to call or come in to the hospital  If you notice a decrease in your baby's movement.   If your begin to experience contractions that are 5 minutes or less apart and lasting for over 45 seconds, or if contractions are becoming more painful.  If you have any bleeding or leakage of fluids.   If you have a headache not resolved with tylenol, right upper abdominal pain, or sudden onset of swelling.  You know your body best. Never hesitate to call or go to the hospital if something doesn't feel right!    Preparing for the hospital  You re likely feeling anxious as your child s birth approaches. This is normal. To give yourself some peace of mind, pack a bag 3-4 weeks before your due date. Here is a list of things to remember:  Personal care items like toothbrush, hair brush, lip balm, lotion, shampoo, glasses, contacts  Nightgown, bathrobe, slippers  Several pairs of underwear  Comfortable clothes for you to wear home  Camera with new batteries  Cell phone and   Insurance information and any other paperwork needed for your hospital stay  Clothes for baby to wear home  An infant, rear-facing car seat for bringing home your baby (this is required by law)    Managing Labor Pain   There are many ways to manage pain during labor. It can often be done with no anesthesia or strong pain medications. Talk to your health care provider about any options you would like to explore.     Help from Relaxation  Some of these are learned in special classes. Your health care provider can help you find classes. The hospital has a tub you can use during early labor. These methods may be of help to you:   Breathing techniques   A warm tub between contractions   Massage and therapeutic touch by your support person or labor    Reading materials that are comforting or inspiring   Music that is soothing   Hypnosis   Acupuncture and acupressure   Heat and  cold application    Help From Analgesics   Analgesics are mild medications that reduce pain. They can be used along with some relaxation methods. They can give you pain relief without total loss of feeling. They may lessen the pain of strong contractions. You may feel well enough to nap between contractions. They have little effect on your baby if given early in labor. This may be done by injection or by IV.     Help From Anesthetics   Anesthesia involves blockage of all feeling including pain. It can be given in the form of local anesthesia or general anesthesia.  Anesthesia is a type of medication to prevent pain. It is often used in labor. It may numb only one region of your body. This is called regional anesthesia. Or it may let you sleep during surgery. This is called general anesthesia. This type of medication is given by a trained specialist. When possible, regional anesthesia will be used. This is so you can be awake during your baby s birth.     Regional Anesthesia   Regional anesthesia may be used to numb your lower body for a vaginal or  birth. It does not go into your bloodstream. This means that little or none of it will reach your baby. There are two kinds:   Epidural. This is most often given while you sit up or lie on your side. A needle with a flexible tube (catheter) is put in your lower back. The needle is then removed. The anesthetic is sent through the catheter. A pump may be attached. This gives you a constant level of anesthetic. An epidural often only partly affects muscle control. This means you will still be able to push for a vaginal birth.   Spinal. This is most often given in one dose right before delivery. It acts fast. You may sit up or lie down when it is injected. It may affect muscle control in your lower body. This includes the ability to push.    General Anesthesia   General anesthesia lets you sleep and keeps you free of pain during surgery. It may be used for a .  It may be given as an injection. It may be given as an inhaled gas. Or it may be given as both. Delivery often occurs before the medication has reached the baby.    [place] parenting classes [email or web address]  [place] parenting classes [email or web address]-information  Free online classes through [employer]

## 2024-10-14 NOTE — PROGRESS NOTES
"Clinic Note - Return OB Visit    Christa Erickson is a 28 year old  female who presents to clinic for a follow up OB visit. She is currently 35w3d with an estimated date of delivery of 2024 via LMP c/w 1st tri US. She denies headache, chest pain, shortness of breath, abdominal pain/contractions, vaginal bleeding, or abnormal discharge. She has felt baby move.     New concerns today:   -cough for 10-14 days - has tried OTC meds without much success    OB History    Para Term  AB Living   1 0 0 0 0 0   SAB IAB Ectopic Multiple Live Births   0 0 0 0 0      # Outcome Date GA Lbr Alphonse/2nd Weight Sex Type Anes PTL Lv   1 Current                Physical exam:  /60   Pulse 92   Temp 98.4  F (36.9  C) (Temporal)   Resp 16   Ht 1.702 m (5' 7\")   Wt 96.6 kg (213 lb)   LMP 02/10/2024 (Exact Date)   SpO2 97%   BMI 33.36 kg/m      General: alert, female in no acute distress  Abdomen: gravid uterus at 35 cm, FHTs 155 - vertex on handheld US  Extremities: no edema    Supervision of normal first pregnancy, antepartum  G1 at 35+3 weeks today.  Pregnancy complicated by obesity, anxiety on SSRI, asthma. Tdap, RSV, covid booster and flu shot given. GBS swab done today.     Obesity affecting pregnancy, antepartum, unspecified obesity type  Pregravid BMI 30.26 - gained 14 lbs thus far.  Discussed goal weight gain less than 20 pounds during pregnancy.    Mild intermittent asthma without complication  Well-controlled at this time.    OPAL (generalized anxiety disorder)  Doing well on Celexa 20 mg daily.    Bronchitis  Given duration of symptoms, will treat with augmentin today.       Reviewed pre- labs: O pos, myriad nml, girl    Follow up in 2 weeks for routine prenatal visit    Emily Avalos MD  [facility]     "

## 2024-10-15 ENCOUNTER — PRENATAL OFFICE VISIT (OUTPATIENT)
Dept: FAMILY MEDICINE | Facility: CLINIC | Age: 29
End: 2024-10-15
Payer: COMMERCIAL

## 2024-10-15 VITALS
DIASTOLIC BLOOD PRESSURE: 60 MMHG | WEIGHT: 213 LBS | TEMPERATURE: 98.4 F | OXYGEN SATURATION: 97 % | BODY MASS INDEX: 33.43 KG/M2 | SYSTOLIC BLOOD PRESSURE: 110 MMHG | HEIGHT: 67 IN | RESPIRATION RATE: 16 BRPM | HEART RATE: 92 BPM

## 2024-10-15 DIAGNOSIS — F41.1 GAD (GENERALIZED ANXIETY DISORDER): ICD-10-CM

## 2024-10-15 DIAGNOSIS — J45.20 MILD INTERMITTENT ASTHMA WITHOUT COMPLICATION: ICD-10-CM

## 2024-10-15 DIAGNOSIS — Z34.00 SUPERVISION OF NORMAL FIRST PREGNANCY, ANTEPARTUM: Primary | ICD-10-CM

## 2024-10-15 DIAGNOSIS — O99.210 OBESITY AFFECTING PREGNANCY, ANTEPARTUM, UNSPECIFIED OBESITY TYPE: ICD-10-CM

## 2024-10-15 DIAGNOSIS — J40 BRONCHITIS: ICD-10-CM

## 2024-10-15 PROCEDURE — 87653 STREP B DNA AMP PROBE: CPT | Performed by: FAMILY MEDICINE

## 2024-10-15 PROCEDURE — 99207 PR PRENATAL VISIT: CPT | Performed by: FAMILY MEDICINE

## 2024-10-15 PROCEDURE — 99213 OFFICE O/P EST LOW 20 MIN: CPT | Mod: 25 | Performed by: FAMILY MEDICINE

## 2024-10-15 ASSESSMENT — PAIN SCALES - GENERAL: PAINLEVEL: NO PAIN (0)

## 2024-10-18 LAB — GP B STREP DNA SPEC QL NAA+PROBE: POSITIVE

## 2024-10-30 ENCOUNTER — HOSPITAL ENCOUNTER (OUTPATIENT)
Facility: CLINIC | Age: 29
End: 2024-10-30
Admitting: FAMILY MEDICINE
Payer: COMMERCIAL

## 2024-10-30 ENCOUNTER — HOSPITAL ENCOUNTER (OUTPATIENT)
Facility: CLINIC | Age: 29
Discharge: HOME | End: 2024-10-30
Attending: FAMILY MEDICINE | Admitting: FAMILY MEDICINE
Payer: COMMERCIAL

## 2024-10-30 ENCOUNTER — NURSE TRIAGE (OUTPATIENT)
Dept: FAMILY MEDICINE | Facility: CLINIC | Age: 29
End: 2024-10-30

## 2024-10-30 VITALS
DIASTOLIC BLOOD PRESSURE: 75 MMHG | OXYGEN SATURATION: 94 % | TEMPERATURE: 98.2 F | SYSTOLIC BLOOD PRESSURE: 129 MMHG | RESPIRATION RATE: 18 BRPM

## 2024-10-30 PROBLEM — Z36.89 ENCOUNTER FOR TRIAGE IN PREGNANT PATIENT: Status: ACTIVE | Noted: 2024-10-30

## 2024-10-30 RX ORDER — LIDOCAINE 40 MG/G
CREAM TOPICAL
Status: DISCONTINUED | OUTPATIENT
Start: 2024-10-30 | End: 2024-10-30 | Stop reason: HOSPADM

## 2024-10-30 ASSESSMENT — ACTIVITIES OF DAILY LIVING (ADL)
ADLS_ACUITY_SCORE: 0

## 2024-10-30 NOTE — TELEPHONE ENCOUNTER
Called and spoke to [facility] and let them know that Dr. Avalos is out of office and they agreed to contact Dr. Squires with results of evaluation.     Moses Oliva RN  [facility]

## 2024-10-30 NOTE — TELEPHONE ENCOUNTER
Nurse Triage SBAR    Is this a 2nd Level Triage? NO    Situation: Patient called in with concerns of decreased fetal movement.     Background: 37 weeks and 4 Days    Assessment: Patient stated that she can usually feel baby move around 10 times in an hour and about 10 kicks per hour but maybe feeling a third the normal movement and only 3 kicks an hour.     Patient denies any symptoms of labor or vaginal bleeding or discharge, no abd pain or contractions. Denies vision changes or headaches.     Protocol Recommended Disposition:   Go To LD Now (Or To Office With PCP Approval)    Recommendation: Patient was advised to head to L & D for evaluation but [facility] is on [facility] so she is heading to [facility] for Evaluation.      Routed to provider    Does the patient meet one of the following criteria for ADS visit consideration? 16+ years old, with an [facility] PCP     Moses Oliva RN  [facility]       TIP  Providers, please consider if this condition is appropriate for management at one of our Acute and Diagnostic Services sites.     If patient is a good candidate, please use dotphrase <dot>triageresponse and select Refer to ADS to document.      Reason for Disposition   Pregnant 23 or more weeks and baby moving less today by kick count (e.g., kick count < 5 in 1 hour or < 10 in 2 hours)    Additional Information   Negative: Sounds like a life-threatening emergency to the triager   Negative: Pregnant > 36 weeks (i.e., term) and having contractions or other symptoms of labor   Negative: Pregnant < 37 weeks (i.e., ) and having contractions or other symptoms of labor   Negative: Pregnant > 20 weeks and having abdominal pain   Negative: Pregnant > 20 weeks and having vaginal bleeding or spotting   Negative: Blurred vision or visual change   Negative: SEVERE headache and not relieved with acetaminophen (e.g., Tylenol)   Negative: Leakage of fluid from vagina    Answer Assessment - Initial Assessment  "Questions  1. FETAL MOVEMENT: \"Has the baby's movement decreased or changed significantly from normal?\" (e.g., yes, no; describe)      Usually feel her 10 times within an hour and maybe only felt a third of that today.   2. SHEILA: \"What date are you expecting to deliver?\"       11/16/24  3. PREGNANCY: \"How many weeks pregnant are you?\"       37 weeks 4 days   4. OTHER SYMPTOMS: \"Do you have any other symptoms?\" (e.g., abdominal pain, leaking fluid from vagina, vaginal bleeding, etc.)      none    Protocols used: Pregnancy - Decreased Fetal Movement-A-OH    "

## 2024-10-30 NOTE — TELEPHONE ENCOUNTER
This sounds appropriate.  Do you mind calling [facility] at [phone] and just let them know that Dr. Avalos is out of town and they should call Dr. Squires with the results of the evaluation, thanks.  Dr. Squires is on-call.

## 2024-10-30 NOTE — DISCHARGE INSTRUCTIONS
"  Counting Your Baby's Kicks: Care Instructions  Overview     Counting your baby's kicks is one way your doctor can tell that your baby is healthy. You will probably feel your baby move for the first time between 16 and 22 weeks. The movement may feel like flutters rather than kicks. Your baby may move more at certain times of the day. When you are active, you may notice less kicking than when you are resting. At your prenatal visits, your doctor will ask whether the baby is active.  In your last trimester, your doctor may ask you to count the number of times you feel your baby move.  Follow-up care is a key part of your treatment and safety. Be sure to make and go to all appointments, and call your doctor if you are having problems. It's also a good idea to know your test results and keep a list of the medicines you take.  How do you count fetal kicks?  A common method of checking your baby's movement is to note the length of time it takes to count 10 movements (such as kicks, flutters, or rolls).  Pick your baby's most active time of day to count. This may be any time from morning to evening.  If you don't feel 10 movements in an hour, have something to eat or drink and count for another hour. If you don't feel at least 10 movements in the 2-hour period, call your doctor.  Do not use an at-home Doppler heart monitor in place of counting fetal movements.  When should you call for help?   Call your doctor now or seek immediate medical care if:    You feel fewer than 10 movements in a 2-hour period.     You noticed that your baby has stopped moving or is moving less than normal.   Watch closely for changes in your health, and be sure to contact your doctor if you have any problems.  Where can you learn more?  Go to https://www.healthwise.net/patiented  Enter U048 in the search box to learn more about \"Counting Your Baby's Kicks: Care Instructions.\"  Current as of: July 10, 2023  Content Version: 14.2 2024 [employer].   Care instructions adapted under license by your healthcare professional. If you have questions about a medical condition or this instruction, always ask your healthcare professional. [employer] disclaims any warranty or liability for your use of this information.  Learning About When to Call Your Doctor During Pregnancy (After 20 Weeks)  Overview  It's common to have concerns about what might be a problem when you're pregnant. Most pregnancies don't have any serious problems. But it's still important to know when to call your doctor if you have certain symptoms or signs of labor.  These are general suggestions. Your doctor may give you some more information about when to call.  When to call your doctor (after 20 weeks)  Call 911  anytime you think you may need emergency care. For example, call if:  You have severe vaginal bleeding. This means you are soaking through a pad each hour for 2 or more hours.  You have sudden, severe pain in your belly.  You have chest pain, are short of breath, or cough up blood.  You passed out (lost consciousness).  You have a seizure.  You see or feel the umbilical cord.  You think you are about to deliver your baby and can't make it safely to the hospital or birthing center.  Call your doctor now or seek immediate medical care if:  You have vaginal bleeding.  You have belly pain.  You have a fever.  You are dizzy or lightheaded, or you feel like you may faint.  You have signs of a blood clot in your leg (called a deep vein thrombosis), such as:  Pain in the calf, back of the knee, thigh, or groin.  Swelling in your leg or groin.  A color change on the leg or groin. The skin may be reddish or purplish, depending on your usual skin color.  You have symptoms of preeclampsia, such as:  Sudden swelling of your face, hands, or feet.  New vision problems (such as dimness, blurring, or seeing spots).  A severe headache.  You have a sudden release of fluid from  "your vagina. (You think your water broke.)  You've been having regular contractions for an hour. This means that you've had at least 6 contractions within 1 hour, even after you change your position and drink fluids.  You notice that your baby has stopped moving or is moving less than normal.  You have signs of heart failure, such as:  New or increased shortness of breath.  New or worse swelling in your legs, ankles, or feet.  Sudden weight gain, such as more than 2 to 3 pounds in a day or 5 pounds in a week.  Feeling so tired or weak that you cannot do your usual activities.  You have symptoms of a urinary tract infection. These may include:  Pain or burning when you urinate.  A frequent need to urinate without being able to pass much urine.  Pain in the flank, which is just below the rib cage and above the waist on either side of the back.  Blood in your urine.  Watch closely for changes in your health, and be sure to contact your doctor if:  You have vaginal discharge that smells bad.  You feel sad, anxious, or hopeless for more than a few days.  You have skin changes, such as a rash, itching, or a yellow color to your skin.  You have other concerns about your pregnancy.  If you have labor signs at 37 weeks or more  If you have signs of labor at 37 weeks or more, your doctor may tell you to call when your labor becomes more active. Symptoms of active labor include:  Contractions that are regular.  Contractions that are less than 5 minutes apart.  Contractions that are hard to talk through.  Follow-up care is a key part of your treatment and safety. Be sure to make and go to all appointments, and call your doctor if you are having problems. It's also a good idea to know your test results and keep a list of the medicines you take.  Where can you learn more?  Go to https://www.healthwise.net/patiented  Enter N531 in the search box to learn more about \"Learning About When to Call Your Doctor During Pregnancy (After " "20 Weeks).\"  Current as of: July 10, 2023  Content Version: 14.2 2024 [facility] [employer], [facility].   Care instructions adapted under license by your healthcare professional. If you have questions about a medical condition or this instruction, always ask your healthcare professional. [employer] disclaims any warranty or liability for your use of this information.    "

## 2024-10-30 NOTE — CARE PLAN
"Data: Patient assessed in the [facility] for decreased fetal movement. Cervical exam deferred. Membranes intact. Contractions are present on the monitor, but pt was unaware until alerted by the nurse. Pt states \"they must be Enrrique Wolf\". Contactions are 2-6 ,   minutes apart, and last 60-90  seconds. Uterine assessment is  mild during contractions and  soft at rest. See flowsheets for fetal assessment documentation.     Action: Presumed adequate fetal oxygenation documented. Discharge instructions reviewed. Patient instructed to report change in fetal movement, vaginal leaking of fluid or bleeding, abdominal pain, or any concerns related to the pregnancy to provider/clinic.      Response: Orders to discharge home per Dr Squires. Patient verbalized understanding of education and agreement with plan. Discharged to home at 0910.           "
"Data: Patient presented to [facility]: 10/30/2024  7:48 AM.  Reason for maternal/fetal assessment is decreased fetal movement. Patient reports feeling \"only 3 episodes of fetal movement since getting up 2 hours ago\" as compared \"to her normal 10 or more\" . Patient denies uterine contractions, leaking of vaginal fluid/rupture of membranes, vaginal bleeding, abdominal pain, pelvic pressure, nausea, vomiting, headache, visual disturbances, epigastric or RUQ pain, significant edema. Patient reports fetal movement is decreased. Patient is a 37w4d .  Prenatal record reviewed. Pregnancy has been uncomplicated.    Vital signs wnl. Support person is present.     Action: Verbal consent for EFM. Triage assessment completed.     Response: Patient verbalized agreement with plan. Will contact Dr Squires with update and further orders.     "
none

## 2024-10-31 NOTE — PATIENT INSTRUCTIONS
Phone Numbers:  [facility]: [phone]  [facility]: [phone]     When to call or come in to the hospital  If you notice a decrease in your baby's movement.   If your begin to experience contractions that are 5 minutes or less apart and lasting for over 45 seconds, or if contractions are becoming more painful.  If you have any bleeding or leakage of fluids.   If you have a headache not resolved with tylenol, right upper abdominal pain, or sudden onset of swelling.  You know your body best. Never hesitate to call or go to the hospital if something doesn't feel right!    Preparing for the hospital  You re likely feeling anxious as your child s birth approaches. This is normal. To give yourself some peace of mind, pack a bag 3-4 weeks before your due date. Here is a list of things to remember:  Personal care items like toothbrush, hair brush, lip balm, lotion, shampoo, glasses, contacts  Nightgown, bathrobe, slippers  Several pairs of underwear  Comfortable clothes for you to wear home  Camera with new batteries  Cell phone and   Insurance information and any other paperwork needed for your hospital stay  Clothes for baby to wear home  An infant, rear-facing car seat for bringing home your baby (this is required by law)

## 2024-10-31 NOTE — PROGRESS NOTES
"Clinic Note - Return OB Visit    Christa Erickson is a 28 year old  female who presents to clinic for a follow up OB visit. She is currently 37w6d with an estimated date of delivery of 2024 via LMP c/w 1st tri US. She denies headache, chest pain, shortness of breath, abdominal pain/contractions, vaginal bleeding, or abnormal discharge. She has felt baby move.     New concerns today:   -was seen at L&D 2 days ago for concern of decreased fetal movement, no concerns on evaluation so discharged home - states this was scary but good movement  -some more pelvic pressure but still overall feels like baby is high    OB History    Para Term  AB Living   1 0 0 0 0 0   SAB IAB Ectopic Multiple Live Births   0 0 0 0 0      # Outcome Date GA Lbr Alphonse/2nd Weight Sex Type Anes PTL Lv   1 Current                Physical exam:  /60   Pulse 63   Temp 97.1  F (36.2  C) (Temporal)   Resp 16   Ht 1.702 m (5' 7\")   Wt 96.3 kg (212 lb 6.4 oz)   LMP 02/10/2024 (Exact Date)   SpO2 98%   BMI 33.27 kg/m      General: alert, female in no acute distress  Abdomen: gravid uterus at 37 cm, FHTs 140 - vertex on handheld US  Cervix: closed  Extremities: no edema    Supervision of normal first pregnancy, antepartum  G1 at 37+6 weeks today.  Pregnancy complicated by obesity, anxiety on SSRI, asthma. Tdap, RSV, covid booster and flu shot given. GBS positive - will need PCN during labor.     Obesity affecting pregnancy, antepartum, unspecified obesity type  Pregravid BMI 30.26 - gained 13 lbs thus far.  Discussed goal weight gain less than 20 pounds during pregnancy.    Mild intermittent asthma without complication  Well-controlled at this time.    OPAL (generalized anxiety disorder)  Doing well on Celexa 20 mg daily.      Reviewed pre-edwin labs: O pos, myriad nml, girl    Follow up in 1 week for routine prenatal visit    Emily Avalos MD  [facility]     "
Yes

## 2024-11-01 ENCOUNTER — PRENATAL OFFICE VISIT (OUTPATIENT)
Dept: FAMILY MEDICINE | Facility: CLINIC | Age: 29
End: 2024-11-01
Payer: COMMERCIAL

## 2024-11-01 VITALS
BODY MASS INDEX: 33.34 KG/M2 | TEMPERATURE: 97.1 F | RESPIRATION RATE: 16 BRPM | HEART RATE: 63 BPM | WEIGHT: 212.4 LBS | OXYGEN SATURATION: 98 % | SYSTOLIC BLOOD PRESSURE: 110 MMHG | DIASTOLIC BLOOD PRESSURE: 60 MMHG | HEIGHT: 67 IN

## 2024-11-01 DIAGNOSIS — O99.210 OBESITY AFFECTING PREGNANCY, ANTEPARTUM, UNSPECIFIED OBESITY TYPE: ICD-10-CM

## 2024-11-01 DIAGNOSIS — J45.20 MILD INTERMITTENT ASTHMA WITHOUT COMPLICATION: ICD-10-CM

## 2024-11-01 DIAGNOSIS — F41.1 GAD (GENERALIZED ANXIETY DISORDER): ICD-10-CM

## 2024-11-01 DIAGNOSIS — Z34.00 SUPERVISION OF NORMAL FIRST PREGNANCY, ANTEPARTUM: Primary | ICD-10-CM

## 2024-11-01 PROCEDURE — 99207 PR PRENATAL VISIT: CPT | Performed by: FAMILY MEDICINE

## 2024-11-01 ASSESSMENT — PAIN SCALES - GENERAL: PAINLEVEL_OUTOF10: NO PAIN (0)

## 2024-11-03 ASSESSMENT — ASTHMA QUESTIONNAIRES
QUESTION_2 LAST FOUR WEEKS HOW OFTEN HAVE YOU HAD SHORTNESS OF BREATH: ONCE OR TWICE A WEEK
QUESTION_1 LAST FOUR WEEKS HOW MUCH OF THE TIME DID YOUR ASTHMA KEEP YOU FROM GETTING AS MUCH DONE AT WORK, SCHOOL OR AT HOME: NONE OF THE TIME
ACT_TOTALSCORE: 20
QUESTION_5 LAST FOUR WEEKS HOW WOULD YOU RATE YOUR ASTHMA CONTROL: WELL CONTROLLED
QUESTION_3 LAST FOUR WEEKS HOW OFTEN DID YOUR ASTHMA SYMPTOMS (WHEEZING, COUGHING, SHORTNESS OF BREATH, CHEST TIGHTNESS OR PAIN) WAKE YOU UP AT NIGHT OR EARLIER THAN USUAL IN THE MORNING: ONCE OR TWICE
ACT_TOTALSCORE: 20
QUESTION_4 LAST FOUR WEEKS HOW OFTEN HAVE YOU USED YOUR RESCUE INHALER OR NEBULIZER MEDICATION (SUCH AS ALBUTEROL): TWO OR THREE TIMES PER WEEK

## 2024-11-04 NOTE — PATIENT INSTRUCTIONS
Phone Numbers:  [facility]: [phone]  [facility]: [phone]     When to call or come in to the hospital  If you notice a decrease in your baby's movement.   If your begin to experience contractions that are 5 minutes or less apart and lasting for over 45 seconds, or if contractions are becoming more painful.  If you have any bleeding or leakage of fluids.   If you have a headache not resolved with tylenol, right upper abdominal pain, or sudden onset of swelling.  You know your body best. Never hesitate to call or go to the hospital if something doesn't feel right!    Preparing for the hospital  You re likely feeling anxious as your child s birth approaches. This is normal. To give yourself some peace of mind, pack a bag 3-4 weeks before your due date. Here is a list of things to remember:  Personal care items like toothbrush, hair brush, lip balm, lotion, shampoo, glasses, contacts  Nightgown, bathrobe, slippers  Several pairs of underwear  Comfortable clothes for you to wear home  Camera with new batteries  Cell phone and   Insurance information and any other paperwork needed for your hospital stay  Clothes for baby to wear home  An infant, rear-facing car seat for bringing home your baby (this is required by law)

## 2024-11-04 NOTE — PROGRESS NOTES
"Clinic Note - Return OB Visit    Christa Erickson is a 28 year old  female who presents to clinic for a follow up OB visit. She is currently 38w4d with an estimated date of delivery of 2024 via LMP c/w 1st tri US. She denies headache, chest pain, shortness of breath, abdominal pain/contractions, vaginal bleeding, or abnormal discharge. She has felt baby move.     New concerns today:   -some tightening/cramping and pelvic pressure    OB History    Para Term  AB Living   1 0 0 0 0 0   SAB IAB Ectopic Multiple Live Births   0 0 0 0 0      # Outcome Date GA Lbr Alphonse/2nd Weight Sex Type Anes PTL Lv   1 Current                Physical exam:  /80   Pulse 74   Temp 97.1  F (36.2  C) (Temporal)   Resp 16   Ht 1.702 m (5' 7\")   Wt 97.1 kg (214 lb 1.6 oz)   LMP 02/10/2024 (Exact Date)   SpO2 99%   BMI 33.53 kg/m      General: alert, female in no acute distress  Abdomen: gravid uterus at 36 cm, FHTs 130 - vertex on handheld US  Cervix: closed but soft and easier to reach than last week  Extremities: no edema    Supervision of normal first pregnancy, antepartum  G1 at 38+4 weeks today.  Pregnancy complicated by obesity, anxiety on SSRI, asthma. Tdap, RSV, covid booster and flu shot given. GBS positive - will need PCN during labor.     Obesity affecting pregnancy, antepartum, unspecified obesity type  Pregravid BMI 30.26 - gained 15 lbs thus far.  Discussed goal weight gain less than 20 pounds during pregnancy.    Mild intermittent asthma without complication  Well-controlled at this time.    OPAL (generalized anxiety disorder)  Doing well on Celexa 20 mg daily.      Reviewed pre- labs: O pos, myriad nml, girl    Follow up in 1 week for routine prenatal visit    Emily Avalos MD  [facility]     "

## 2024-11-06 ENCOUNTER — PRENATAL OFFICE VISIT (OUTPATIENT)
Dept: FAMILY MEDICINE | Facility: CLINIC | Age: 29
End: 2024-11-06
Payer: COMMERCIAL

## 2024-11-06 VITALS
DIASTOLIC BLOOD PRESSURE: 80 MMHG | BODY MASS INDEX: 33.6 KG/M2 | WEIGHT: 214.1 LBS | HEART RATE: 74 BPM | HEIGHT: 67 IN | SYSTOLIC BLOOD PRESSURE: 110 MMHG | TEMPERATURE: 97.1 F | RESPIRATION RATE: 16 BRPM | OXYGEN SATURATION: 99 %

## 2024-11-06 DIAGNOSIS — F41.1 GAD (GENERALIZED ANXIETY DISORDER): ICD-10-CM

## 2024-11-06 DIAGNOSIS — O99.210 OBESITY AFFECTING PREGNANCY, ANTEPARTUM, UNSPECIFIED OBESITY TYPE: ICD-10-CM

## 2024-11-06 DIAGNOSIS — Z34.00 SUPERVISION OF NORMAL FIRST PREGNANCY, ANTEPARTUM: Primary | ICD-10-CM

## 2024-11-06 DIAGNOSIS — J45.20 MILD INTERMITTENT ASTHMA WITHOUT COMPLICATION: ICD-10-CM

## 2024-11-06 PROCEDURE — 99207 PR PRENATAL VISIT: CPT | Performed by: FAMILY MEDICINE

## 2024-11-06 ASSESSMENT — PAIN SCALES - GENERAL: PAINLEVEL_OUTOF10: NO PAIN (0)

## 2024-11-08 ASSESSMENT — ASTHMA QUESTIONNAIRES
QUESTION_2 LAST FOUR WEEKS HOW OFTEN HAVE YOU HAD SHORTNESS OF BREATH: ONCE OR TWICE A WEEK
ACT_TOTALSCORE: 20
QUESTION_3 LAST FOUR WEEKS HOW OFTEN DID YOUR ASTHMA SYMPTOMS (WHEEZING, COUGHING, SHORTNESS OF BREATH, CHEST TIGHTNESS OR PAIN) WAKE YOU UP AT NIGHT OR EARLIER THAN USUAL IN THE MORNING: ONCE OR TWICE
QUESTION_4 LAST FOUR WEEKS HOW OFTEN HAVE YOU USED YOUR RESCUE INHALER OR NEBULIZER MEDICATION (SUCH AS ALBUTEROL): TWO OR THREE TIMES PER WEEK
QUESTION_5 LAST FOUR WEEKS HOW WOULD YOU RATE YOUR ASTHMA CONTROL: WELL CONTROLLED
ACT_TOTALSCORE: 20
QUESTION_1 LAST FOUR WEEKS HOW MUCH OF THE TIME DID YOUR ASTHMA KEEP YOU FROM GETTING AS MUCH DONE AT WORK, SCHOOL OR AT HOME: NONE OF THE TIME

## 2024-11-11 NOTE — PROGRESS NOTES
"Clinic Note - Return OB Visit    Christa Erickson is a 28 year old  female who presents to clinic for a follow up OB visit. She is currently 39w4d with an estimated date of delivery of 2024 via LMP c/w 1st tri US. She denies headache, chest pain, shortness of breath, abdominal pain/contractions, vaginal bleeding, or abnormal discharge. She has felt baby move.     New concerns today:   -not sleeping great - some dizziness    OB History    Para Term  AB Living   1 0 0 0 0 0   SAB IAB Ectopic Multiple Live Births   0 0 0 0 0      # Outcome Date GA Lbr Alphonse/2nd Weight Sex Type Anes PTL Lv   1 Current                Physical exam:  /70   Pulse 83   Temp 97  F (36.1  C) (Temporal)   Resp 16   Ht 1.702 m (5' 7\")   Wt 95.6 kg (210 lb 11.2 oz)   LMP 02/10/2024 (Exact Date)   SpO2 98%   BMI 33.00 kg/m      General: alert, female in no acute distress  Abdomen: gravid uterus at 37 cm, FHTs 135 - vertex on handheld US  Cervix: closed  Extremities: no edema    Supervision of normal first pregnancy, antepartum  G1 at 39+4 weeks today.  Pregnancy complicated by obesity, anxiety on SSRI, asthma. Tdap, RSV, covid booster and flu shot given. GBS positive - will need PCN during labor.     Obesity affecting pregnancy, antepartum, unspecified obesity type  Pregravid BMI 30.26 - gained 11 lbs thus far.  Discussed goal weight gain less than 20 pounds during pregnancy.    Mild intermittent asthma without complication  Well-controlled at this time.    OPAL (generalized anxiety disorder)  Doing well on Celexa 20 mg daily.      Reviewed pre-edwin labs: O pos, myriad nml, girl    Follow up in 1 week for routine prenatal visit    Emily Avalos MD  [facility]     "

## 2024-11-13 ENCOUNTER — PRENATAL OFFICE VISIT (OUTPATIENT)
Dept: FAMILY MEDICINE | Facility: CLINIC | Age: 29
End: 2024-11-13
Payer: COMMERCIAL

## 2024-11-13 VITALS
HEART RATE: 83 BPM | BODY MASS INDEX: 33.07 KG/M2 | TEMPERATURE: 97 F | WEIGHT: 210.7 LBS | HEIGHT: 67 IN | RESPIRATION RATE: 16 BRPM | OXYGEN SATURATION: 98 % | DIASTOLIC BLOOD PRESSURE: 70 MMHG | SYSTOLIC BLOOD PRESSURE: 110 MMHG

## 2024-11-13 DIAGNOSIS — O99.210 OBESITY AFFECTING PREGNANCY, ANTEPARTUM, UNSPECIFIED OBESITY TYPE: ICD-10-CM

## 2024-11-13 DIAGNOSIS — Z34.00 SUPERVISION OF NORMAL FIRST PREGNANCY, ANTEPARTUM: Primary | ICD-10-CM

## 2024-11-13 DIAGNOSIS — F41.1 GAD (GENERALIZED ANXIETY DISORDER): ICD-10-CM

## 2024-11-13 DIAGNOSIS — J45.20 MILD INTERMITTENT ASTHMA WITHOUT COMPLICATION: ICD-10-CM

## 2024-11-13 PROCEDURE — 99207 PR PRENATAL VISIT: CPT | Performed by: FAMILY MEDICINE

## 2024-11-13 ASSESSMENT — PAIN SCALES - GENERAL: PAINLEVEL_OUTOF10: NO PAIN (0)

## 2024-11-15 ASSESSMENT — ASTHMA QUESTIONNAIRES
QUESTION_3 LAST FOUR WEEKS HOW OFTEN DID YOUR ASTHMA SYMPTOMS (WHEEZING, COUGHING, SHORTNESS OF BREATH, CHEST TIGHTNESS OR PAIN) WAKE YOU UP AT NIGHT OR EARLIER THAN USUAL IN THE MORNING: ONCE OR TWICE
QUESTION_4 LAST FOUR WEEKS HOW OFTEN HAVE YOU USED YOUR RESCUE INHALER OR NEBULIZER MEDICATION (SUCH AS ALBUTEROL): TWO OR THREE TIMES PER WEEK
QUESTION_5 LAST FOUR WEEKS HOW WOULD YOU RATE YOUR ASTHMA CONTROL: WELL CONTROLLED
ACT_TOTALSCORE: 20
ACT_TOTALSCORE: 20
QUESTION_2 LAST FOUR WEEKS HOW OFTEN HAVE YOU HAD SHORTNESS OF BREATH: ONCE OR TWICE A WEEK
QUESTION_1 LAST FOUR WEEKS HOW MUCH OF THE TIME DID YOUR ASTHMA KEEP YOU FROM GETTING AS MUCH DONE AT WORK, SCHOOL OR AT HOME: NONE OF THE TIME

## 2024-11-18 NOTE — PROGRESS NOTES
"Clinic Note - Return OB Visit    Christa Erickson is a 28 year old  female who presents to clinic for a follow up OB visit. She is currently 40w4d with an estimated date of delivery of 2024 via LMP c/w 1st tri US. She denies headache, chest pain, shortness of breath, abdominal pain/contractions, vaginal bleeding, or abnormal discharge. She has felt baby move.     New concerns today:   -ready to be done - open to induction before the weekend if able, ok with either hospital but [facility]    OB History    Para Term  AB Living   1 0 0 0 0 0   SAB IAB Ectopic Multiple Live Births   0 0 0 0 0      # Outcome Date GA Lbr Alphonse/2nd Weight Sex Type Anes PTL Lv   1 Current                Physical exam:  /80   Pulse 94   Temp 97.4  F (36.3  C) (Temporal)   Resp 18   Ht 1.702 m (5' 7\")   Wt 97.3 kg (214 lb 9.6 oz)   LMP 02/10/2024 (Exact Date)   SpO2 99%   BMI 33.61 kg/m      General: alert, female in no acute distress  Abdomen: gravid uterus at 40 cm, FHTs 120 - vertex on handheld US  Cervix: fingertip - easier to reach cervix today compared to last week  Extremities: no edema    Supervision of normal first pregnancy, antepartum  G1 at 40+4 weeks today.  Pregnancy complicated by obesity, anxiety on SSRI, asthma. Tdap, RSV, covid booster and flu shot given. GBS done today. Will call hospital to schedule induction - given obesity should be fine to start prior to 41 weeks.    Obesity affecting pregnancy, antepartum, unspecified obesity type  Pregravid BMI 30.26 - gained 11 lbs thus far.  Discussed goal weight gain less than 20 pounds during pregnancy.    Mild intermittent asthma without complication  Well-controlled at this time.    OPAL (generalized anxiety disorder)  Doing well on Celexa 20 mg daily.      Reviewed pre- labs: O pos, myriad nml, girl    Follow up in 1 week for routine prenatal visit    Emily Avalos MD  [facility]     "

## 2024-11-18 NOTE — PATIENT INSTRUCTIONS
Phone Numbers:  [facility]: [phone]  [facility]: [phone]     When to call or come in to the hospital  If you notice a decrease in your baby's movement.   If your begin to experience contractions that are 5 minutes or less apart and lasting for over 45 seconds, or if contractions are becoming more painful.  If you have any bleeding or leakage of fluids.   If you have a headache not resolved with tylenol, right upper abdominal pain, or sudden onset of swelling.  You know your body best. Never hesitate to call or go to the hospital if something doesn't feel right!    Preparing for the hospital  You re likely feeling anxious as your child s birth approaches. This is normal. To give yourself some peace of mind, pack a bag 3-4 weeks before your due date. Here is a list of things to remember:  Personal care items like toothbrush, hair brush, lip balm, lotion, shampoo, glasses, contacts  Nightgown, bathrobe, slippers  Several pairs of underwear  Comfortable clothes for you to wear home  Camera with new batteries  Cell phone and   Insurance information and any other paperwork needed for your hospital stay  Clothes for baby to wear home  An infant, rear-facing car seat for bringing home your baby (this is required by law)

## 2024-11-20 ENCOUNTER — PRENATAL OFFICE VISIT (OUTPATIENT)
Dept: FAMILY MEDICINE | Facility: CLINIC | Age: 29
End: 2024-11-20
Payer: COMMERCIAL

## 2024-11-20 VITALS
RESPIRATION RATE: 18 BRPM | BODY MASS INDEX: 33.68 KG/M2 | DIASTOLIC BLOOD PRESSURE: 80 MMHG | WEIGHT: 214.6 LBS | TEMPERATURE: 97.4 F | SYSTOLIC BLOOD PRESSURE: 120 MMHG | OXYGEN SATURATION: 99 % | HEART RATE: 94 BPM | HEIGHT: 67 IN

## 2024-11-20 DIAGNOSIS — F41.1 GAD (GENERALIZED ANXIETY DISORDER): ICD-10-CM

## 2024-11-20 DIAGNOSIS — J45.20 MILD INTERMITTENT ASTHMA WITHOUT COMPLICATION: ICD-10-CM

## 2024-11-20 DIAGNOSIS — O99.210 OBESITY AFFECTING PREGNANCY, ANTEPARTUM, UNSPECIFIED OBESITY TYPE: ICD-10-CM

## 2024-11-20 DIAGNOSIS — Z34.00 SUPERVISION OF NORMAL FIRST PREGNANCY, ANTEPARTUM: Primary | ICD-10-CM

## 2024-11-20 ASSESSMENT — PAIN SCALES - GENERAL: PAINLEVEL_OUTOF10: NO PAIN (0)

## 2024-11-21 ENCOUNTER — HOSPITAL ENCOUNTER (INPATIENT)
Facility: HOSPITAL | Age: 29
LOS: 6 days | Discharge: HOME HEALTH | End: 2024-11-27
Attending: FAMILY MEDICINE | Admitting: FAMILY MEDICINE
Payer: COMMERCIAL

## 2024-11-21 DIAGNOSIS — O13.9 GESTATIONAL HYPERTENSION, ANTEPARTUM: ICD-10-CM

## 2024-11-21 DIAGNOSIS — Z34.90 ENCOUNTER FOR INDUCTION OF LABOR: Primary | ICD-10-CM

## 2024-11-21 DIAGNOSIS — Z98.891 STATUS POST PRIMARY LOW TRANSVERSE CESAREAN SECTION: ICD-10-CM

## 2024-11-21 LAB
ABO/RH(D): NORMAL
ANTIBODY SCREEN: NEGATIVE
GP B STREP DNA SPEC QL NAA+PROBE: POSITIVE
HGB BLD-MCNC: 13.5 G/DL (ref 11.7–15.7)
SPECIMEN EXPIRATION DATE: NORMAL
T PALLIDUM AB SER QL: NONREACTIVE

## 2024-11-21 PROCEDURE — 36415 COLL VENOUS BLD VENIPUNCTURE: CPT | Performed by: FAMILY MEDICINE

## 2024-11-21 PROCEDURE — 258N000003 HC RX IP 258 OP 636: Performed by: FAMILY MEDICINE

## 2024-11-21 PROCEDURE — 250N000013 HC RX MED GY IP 250 OP 250 PS 637: Performed by: FAMILY MEDICINE

## 2024-11-21 PROCEDURE — 120N000013 HC R&B IMCU

## 2024-11-21 PROCEDURE — 85018 HEMOGLOBIN: CPT | Performed by: FAMILY MEDICINE

## 2024-11-21 PROCEDURE — 86900 BLOOD TYPING SEROLOGIC ABO: CPT | Performed by: FAMILY MEDICINE

## 2024-11-21 PROCEDURE — 3E0P7VZ INTRODUCTION OF HORMONE INTO FEMALE REPRODUCTIVE, VIA NATURAL OR ARTIFICIAL OPENING: ICD-10-PCS | Performed by: FAMILY MEDICINE

## 2024-11-21 PROCEDURE — 99221 1ST HOSP IP/OBS SF/LOW 40: CPT | Performed by: FAMILY MEDICINE

## 2024-11-21 PROCEDURE — 86780 TREPONEMA PALLIDUM: CPT | Performed by: FAMILY MEDICINE

## 2024-11-21 RX ORDER — PENICILLIN G 3000000 [IU]/50ML
3 INJECTION, SOLUTION INTRAVENOUS EVERY 4 HOURS
Status: DISCONTINUED | OUTPATIENT
Start: 2024-11-21 | End: 2024-11-24 | Stop reason: HOSPADM

## 2024-11-21 RX ORDER — LIDOCAINE 40 MG/G
CREAM TOPICAL
Status: DISCONTINUED | OUTPATIENT
Start: 2024-11-21 | End: 2024-11-21 | Stop reason: HOSPADM

## 2024-11-21 RX ORDER — PROCHLORPERAZINE MALEATE 10 MG
10 TABLET ORAL EVERY 6 HOURS PRN
Status: DISCONTINUED | OUTPATIENT
Start: 2024-11-21 | End: 2024-11-24 | Stop reason: HOSPADM

## 2024-11-21 RX ORDER — METOCLOPRAMIDE HYDROCHLORIDE 5 MG/ML
10 INJECTION INTRAMUSCULAR; INTRAVENOUS EVERY 6 HOURS PRN
Status: DISCONTINUED | OUTPATIENT
Start: 2024-11-21 | End: 2024-11-24 | Stop reason: HOSPADM

## 2024-11-21 RX ORDER — NALOXONE HYDROCHLORIDE 0.4 MG/ML
0.4 INJECTION, SOLUTION INTRAMUSCULAR; INTRAVENOUS; SUBCUTANEOUS
Status: DISCONTINUED | OUTPATIENT
Start: 2024-11-21 | End: 2024-11-24 | Stop reason: HOSPADM

## 2024-11-21 RX ORDER — MORPHINE SULFATE 10 MG/ML
10 INJECTION, SOLUTION INTRAMUSCULAR; INTRAVENOUS
Status: DISCONTINUED | OUTPATIENT
Start: 2024-11-21 | End: 2024-11-24 | Stop reason: HOSPADM

## 2024-11-21 RX ORDER — LOPERAMIDE HYDROCHLORIDE 2 MG/1
2 CAPSULE ORAL
Status: DISCONTINUED | OUTPATIENT
Start: 2024-11-21 | End: 2024-11-24 | Stop reason: HOSPADM

## 2024-11-21 RX ORDER — KETOROLAC TROMETHAMINE 30 MG/ML
30 INJECTION, SOLUTION INTRAMUSCULAR; INTRAVENOUS
Status: COMPLETED | OUTPATIENT
Start: 2024-11-21 | End: 2024-11-24

## 2024-11-21 RX ORDER — OXYTOCIN/0.9 % SODIUM CHLORIDE 30/500 ML
100-340 PLASTIC BAG, INJECTION (ML) INTRAVENOUS CONTINUOUS PRN
Status: DISCONTINUED | OUTPATIENT
Start: 2024-11-21 | End: 2024-11-27 | Stop reason: HOSPADM

## 2024-11-21 RX ORDER — MISOPROSTOL 200 UG/1
400 TABLET ORAL
Status: DISCONTINUED | OUTPATIENT
Start: 2024-11-21 | End: 2024-11-24 | Stop reason: HOSPADM

## 2024-11-21 RX ORDER — NALOXONE HYDROCHLORIDE 0.4 MG/ML
0.2 INJECTION, SOLUTION INTRAMUSCULAR; INTRAVENOUS; SUBCUTANEOUS
Status: DISCONTINUED | OUTPATIENT
Start: 2024-11-21 | End: 2024-11-24 | Stop reason: HOSPADM

## 2024-11-21 RX ORDER — OXYTOCIN/0.9 % SODIUM CHLORIDE 30/500 ML
340 PLASTIC BAG, INJECTION (ML) INTRAVENOUS CONTINUOUS PRN
Status: DISCONTINUED | OUTPATIENT
Start: 2024-11-21 | End: 2024-11-24 | Stop reason: HOSPADM

## 2024-11-21 RX ORDER — IBUPROFEN 800 MG/1
800 TABLET, FILM COATED ORAL
Status: COMPLETED | OUTPATIENT
Start: 2024-11-21 | End: 2024-11-24

## 2024-11-21 RX ORDER — OXYTOCIN 10 [USP'U]/ML
10 INJECTION, SOLUTION INTRAMUSCULAR; INTRAVENOUS
Status: DISCONTINUED | OUTPATIENT
Start: 2024-11-21 | End: 2024-11-24 | Stop reason: HOSPADM

## 2024-11-21 RX ORDER — MISOPROSTOL 200 UG/1
800 TABLET ORAL
Status: DISCONTINUED | OUTPATIENT
Start: 2024-11-21 | End: 2024-11-24 | Stop reason: HOSPADM

## 2024-11-21 RX ORDER — TRANEXAMIC ACID 10 MG/ML
1 INJECTION, SOLUTION INTRAVENOUS EVERY 30 MIN PRN
Status: DISCONTINUED | OUTPATIENT
Start: 2024-11-21 | End: 2024-11-24 | Stop reason: HOSPADM

## 2024-11-21 RX ORDER — ONDANSETRON 4 MG/1
4 TABLET, ORALLY DISINTEGRATING ORAL EVERY 6 HOURS PRN
Status: DISCONTINUED | OUTPATIENT
Start: 2024-11-21 | End: 2024-11-24 | Stop reason: HOSPADM

## 2024-11-21 RX ORDER — LOPERAMIDE HYDROCHLORIDE 2 MG/1
4 CAPSULE ORAL
Status: DISCONTINUED | OUTPATIENT
Start: 2024-11-21 | End: 2024-11-24 | Stop reason: HOSPADM

## 2024-11-21 RX ORDER — METHYLERGONOVINE MALEATE 0.2 MG/ML
200 INJECTION INTRAVENOUS
Status: DISCONTINUED | OUTPATIENT
Start: 2024-11-21 | End: 2024-11-24 | Stop reason: HOSPADM

## 2024-11-21 RX ORDER — CITRIC ACID/SODIUM CITRATE 334-500MG
30 SOLUTION, ORAL ORAL
Status: DISCONTINUED | OUTPATIENT
Start: 2024-11-21 | End: 2024-11-24 | Stop reason: HOSPADM

## 2024-11-21 RX ORDER — PENICILLIN G POTASSIUM 5000000 [IU]/1
5 INJECTION, POWDER, FOR SOLUTION INTRAMUSCULAR; INTRAVENOUS ONCE
Status: COMPLETED | OUTPATIENT
Start: 2024-11-21 | End: 2024-11-24

## 2024-11-21 RX ORDER — METOCLOPRAMIDE 10 MG/1
10 TABLET ORAL EVERY 6 HOURS PRN
Status: DISCONTINUED | OUTPATIENT
Start: 2024-11-21 | End: 2024-11-24 | Stop reason: HOSPADM

## 2024-11-21 RX ORDER — TERBUTALINE SULFATE 1 MG/ML
0.25 INJECTION, SOLUTION SUBCUTANEOUS
Status: DISCONTINUED | OUTPATIENT
Start: 2024-11-21 | End: 2024-11-24 | Stop reason: HOSPADM

## 2024-11-21 RX ORDER — ONDANSETRON 2 MG/ML
4 INJECTION INTRAMUSCULAR; INTRAVENOUS EVERY 6 HOURS PRN
Status: DISCONTINUED | OUTPATIENT
Start: 2024-11-21 | End: 2024-11-24 | Stop reason: HOSPADM

## 2024-11-21 RX ORDER — OXYTOCIN 10 [USP'U]/ML
10 INJECTION, SOLUTION INTRAMUSCULAR; INTRAVENOUS
Status: DISCONTINUED | OUTPATIENT
Start: 2024-11-21 | End: 2024-11-27 | Stop reason: HOSPADM

## 2024-11-21 RX ORDER — CARBOPROST TROMETHAMINE 250 UG/ML
250 INJECTION, SOLUTION INTRAMUSCULAR
Status: DISCONTINUED | OUTPATIENT
Start: 2024-11-21 | End: 2024-11-24 | Stop reason: HOSPADM

## 2024-11-21 RX ORDER — ACETAMINOPHEN 325 MG/1
975 TABLET ORAL EVERY 4 HOURS PRN
Status: DISCONTINUED | OUTPATIENT
Start: 2024-11-21 | End: 2024-11-24 | Stop reason: HOSPADM

## 2024-11-21 RX ORDER — HYDROXYZINE HYDROCHLORIDE 50 MG/1
50 TABLET, FILM COATED ORAL
Status: DISCONTINUED | OUTPATIENT
Start: 2024-11-21 | End: 2024-11-24 | Stop reason: HOSPADM

## 2024-11-21 RX ADMIN — DINOPROSTONE 10 MG: 10 INSERT VAGINAL at 10:03

## 2024-11-21 RX ADMIN — SODIUM CHLORIDE, POTASSIUM CHLORIDE, SODIUM LACTATE AND CALCIUM CHLORIDE 500 ML: 600; 310; 30; 20 INJECTION, SOLUTION INTRAVENOUS at 18:02

## 2024-11-21 ASSESSMENT — ACTIVITIES OF DAILY LIVING (ADL)
ADLS_ACUITY_SCORE: 0

## 2024-11-21 NOTE — PLAN OF CARE
Goal Outcome Evaluation:    Problem: Adult Inpatient Plan of Care  Goal: Patient-Specific Goal (Individualized)  Outcome: Progressing  Flowsheets (Taken 11/21/2024 2006)  Individualized Care Needs: induction of labor for postdates, elevated BMI  Anxieties, Fears or Concerns: anxious to meet baby  Patient/Family-Specific Goals (Include Timeframe): pt will have a snider score of >8 by tomorrow morning     VSS. Pt was seen in the clinic yesterday, SVE done by Dr. Avalos was fingertip. Pt was vertex on ultrasound in clinic yesterday per provider. Pt reports not feeling contractions overnight. Orders obtained for cervidil which was placed at 1003. PIV placed. Will continue to monitor.

## 2024-11-21 NOTE — PROGRESS NOTES
"Dr. Avalos updated of uterine tachysystole with cervidil in place. At 1726, pt had 2 recurrent late decelerations, otherwise baby has had moderate variability and accels. Pt reports feeling contractions \"about every 5-10 minutes\" but feels them as cramping pain. Provider states to give a 500ml bolus of LR. If tachysystole continues, remove cervidil and notify provider. Pt and significant other updated and agreeable to plan of care. Will continue to monitor.    "

## 2024-11-21 NOTE — PROGRESS NOTES
Data: [facility] presented to [facility]: 2024  8:05 AM.  Reason for maternal/fetal assessment is  scheduled induction of labor for postdates, elevated BMI . Patient denies uterine contractions, leaking of vaginal fluid/rupture of membranes, vaginal bleeding, abdominal pain, pelvic pressure, nausea, vomiting, headache, visual disturbances, epigastric or RUQ pain, significant edema. Patient reports fetal movement is normal. Patient is a 40w5d . Prenatal record reviewed. Pregnancy has been uncomplicated. Support person is present.     Fetal HR baseline was  135, variability is  moderate. Accelerations:  absent. Decelerations:  late. Uterine assessment is mild during contractions and soft  at rest. Membranes: intact.    Vital signs wnl. Patient reports no pain and is coping.     Action: Verbal consent for EFM. Triage assessment completed. Dr. Avalos notified of tracing. Plan to reposition pt and watch EFM for a little longer before determining further plan of care. Will continue to monitor.

## 2024-11-21 NOTE — H&P
OB Admission H&P  Christa Erickson,  1995, MRN [id number]    CC: Encounter for induction of labor    HPI: Christa Erickson is a 28 year old year old  at 40w5d weeks by LMP c/w 1st tri  presenting for induction of labor. She denies contractions, leakage of fluid, and vaginal bleeding; she reports good fetal movement.    Pregnancy complicated by: obesity, anxiety, asthma    ROS: She denies fevers, chills, chest pain, shortness of breath, nausea, vomiting, or dysuria    OB History  OB History    Para Term  AB Living   1 0 0 0 0 0   SAB IAB Ectopic Multiple Live Births   0 0 0 0 0      # Outcome Date GA Lbr Alphonse/2nd Weight Sex Type Anes PTL Lv   1 Current                Medical History:  Past Medical History:   Diagnosis Date    Abdominal migraine 2015    OPAL (generalized anxiety disorder)     Migraine headache without aura     Uncomplicated asthma         Surgical History:   has a past surgical history that includes lasik and Dental surgery.    Social History  Reviewed, and she  reports that she has never smoked. She has never been exposed to tobacco smoke. She has never used smokeless tobacco. She reports current alcohol use. She reports that she does not use drugs.  Denies alcoholol, tobacco, and illicit drugs    Family History:    Allergies   Allergen Reactions    Tree Nuts [Nuts] Itching and Swelling       Medications Prior to Admission   Medication Sig Dispense Refill Last Dose/Taking    citalopram (CELEXA) 20 MG tablet TAKE 1 TABLET(20 MG) BY MOUTH DAILY 90 tablet 3 2024 Morning    loratadine (CLARITIN) 10 MG tablet Take 10 mg by mouth daily   2024 Morning    magnesium gluconate (MAGONATE) 500 (27 Mg) MG tablet Take 500 mg by mouth 2 times daily.   2024 Morning    Prenatal Vit-Fe Fumarate-FA (PNV PRENATAL PLUS MULTIVITAMIN) 27-1 MG TABS per tablet Take 1 tablet by mouth daily   2024 Morning    ondansetron (ZOFRAN ODT) 4 MG ODT tab Take 1 tablet (4 mg) by  "mouth every 8 hours as needed for nausea 20 tablet 1     QVAR REDIHALER 40 MCG/ACT inhaler 1 puff twice daily       VENTOLIN  (90 Base) MCG/ACT inhaler Inhale 2 puffs into the lungs every 4 hours as needed for wheezing          Physical Exam:  Temp:  [98.2  F (36.8  C)-98.4  F (36.9  C)] 98.4  F (36.9  C)  Resp:  [16-18] 18  BP: (119-136)/(70-85) 119/70  SpO2:  [98 %] 98 %    /70 (BP Location: Right arm, Patient Position: Semi-Nunez's, Cuff Size: Adult Regular)   Temp 98.4  F (36.9  C) (Oral)   Resp 18   Ht 1.727 m (5' 8\")   Wt 97.1 kg (214 lb)   LMP 02/10/2024 (Exact Date)   SpO2 98%   BMI 32.54 kg/m      Gen: no acute distress, resting comfortably   CV: acyanotic   Pulm: unlabored respirations   Abd: gravid, soft, nontender   Extremities: soft, nontender    Cervical Exam: yesterday in clinic was a tight 1cm  FHR: 135, mod julienne, +accels, -decels  [place]:  q2-4min    Pertinent Labs  Admission on 11/21/2024   Component Date Value Ref Range Status    Hemoglobin 11/21/2024 13.5  11.7 - 15.7 g/dL Final    Treponema Antibody Total 11/21/2024 Nonreactive  Nonreactive Final    ABO/RH(D) 11/21/2024 O POS   Final    Antibody Screen 11/21/2024 Negative  Negative Final    SPECIMEN EXPIRATION DATE 11/21/2024 [id number]   Final   Prenatal Office Visit on 10/15/2024   Component Date Value Ref Range Status    Group B Strep PCR 10/15/2024 Positive (A)  Negative Final    ALERT: Streptococcus agalactiae (Group B Streptococcus) has a high rate of resistance to clindamycin. Therefore, clindamycin is not recommended for treatment unless susceptibility testing has been performed.   Prenatal Office Visit on 09/18/2024   Component Date Value Ref Range Status    Ursodeoxycholic Ac 09/18/2024 0.3  0.0 - 1.0 umol/L Final    Cholic Acid 09/18/2024 1.7  0.0 - 1.9 umol/L Final    Chenodeoxycholic 09/18/2024 1.9  0.0 - 3.4 umol/L Final    Deoxycholic Acid 09/18/2024 0.6  0.0 - 2.5 umol/L Final    Bile Acids Total 09/18/2024 " 4.5  0.0 - 7.0 umol/L Final    INTERPRETIVE INFORMATION: Bile Acids, Fractionated and Total    This test was developed and its performance characteristics   determined by [facility]. It has not been cleared or   approved by the US Food and Drug Administration. This test   was performed in a CLIA certified laboratory and is   intended for clinical purposes.  Performed By: [facility]  [address] [place] [id number]  : Daniel Mckeon MD, PhD  CLIA Number: [id number]   Prenatal Office Visit on 08/16/2024   Component Date Value Ref Range Status    Glu Gest Screen 1hr 50g 08/16/2024 99  70 - 129 mg/dL Final    WBC Count 08/16/2024 7.2  4.0 - 11.0 10e3/uL Final    RBC Count 08/16/2024 4.20  3.80 - 5.20 10e6/uL Final    Hemoglobin 08/16/2024 12.6  11.7 - 15.7 g/dL Final    Hematocrit 08/16/2024 37.7  35.0 - 47.0 % Final    MCV 08/16/2024 90  78 - 100 fL Final    MCH 08/16/2024 30.0  26.5 - 33.0 pg Final    MCHC 08/16/2024 33.4  31.5 - 36.5 g/dL Final    RDW 08/16/2024 12.8  10.0 - 15.0 % Final    Platelet Count 08/16/2024 204  150 - 450 10e3/uL Final    Treponema Antibody Total 08/16/2024 Nonreactive  Nonreactive Final    Magnesium 08/16/2024 1.8  1.7 - 2.3 mg/dL Final    Ferritin 08/16/2024 50  6 - 175 ng/mL Final    Iron 08/16/2024 87  37 - 145 ug/dL Final    Iron Binding Capacity 08/16/2024 304  240 - 430 ug/dL Final    Iron Sat Index 08/16/2024 29  15 - 46 % Final   Lab on 04/22/2024   Component Date Value Ref Range Status    Hepatitis B Surface Antigen 04/22/2024 Nonreactive  Nonreactive Final    WBC Count 04/22/2024 8.7  4.0 - 11.0 10e3/uL Final    RBC Count 04/22/2024 4.49  3.80 - 5.20 10e6/uL Final    Hemoglobin 04/22/2024 13.4  11.7 - 15.7 g/dL Final    Hematocrit 04/22/2024 38.6  35.0 - 47.0 % Final    MCV 04/22/2024 86  78 - 100 fL Final    MCH 04/22/2024 29.8  26.5 - 33.0 pg Final    MCHC 04/22/2024 34.7  31.5 - 36.5 g/dL Final    RDW 04/22/2024 11.7  10.0 - 15.0  % Final    Platelet Count 04/22/2024 264  150 - 450 10e3/uL Final    HIV Antigen Antibody Combo 04/22/2024 Nonreactive  Nonreactive Final    Negative HIV-1 p24 antigen and HIV-1/2 antibody screening test results usually indicate the absence of HIV-1 and HIV-2 infection. However, such negative results do not rule-out acute HIV infection.  If acute HIV-1 or HIV-2 infection is suspected, detection of HIV-1 or HIV-2 RNA  is recommended.     Rubella [place]y IgG Instrument Value 04/22/2024 2.93  <0.90 Index Final    Rubella Antibody IgG 04/22/2024 Positive   Final    Suggests previous exposure or immunization and probable immunity.    Treponema Antibody Total 04/22/2024 Nonreactive  Nonreactive Final    Hepatitis C Antibody 04/22/2024 Nonreactive  Nonreactive Final    A nonreactive screening test result does not exclude the possibility of exposure to or infection with HCV. Nonreactive screening test results in individuals with prior exposure to HCV may be due to antibody levels below the limit of detection of this assay or lack of reactivity to the HCV antigens used in this assay. Patients with recent HCV infections (<3 months from time of exposure) may have false-negative HCV antibody results due to the time needed for seroconversion (average of 8 to 9 weeks).    See Scanned Result 04/22/2024 [facility] NON-INVASIVE PRENATAL SCREENING PREQUEL-Scanned   Final    ABO/RH(D) 04/22/2024 O POS   Final    Antibody Screen 04/22/2024 Negative  Negative Final    SPECIMEN EXPIRATION DATE 04/22/2024 [id number]   Final   Prenatal Office Visit on 04/18/2024   Component Date Value Ref Range Status    Culture 04/18/2024 <10,000 CFU/mL Mixture of Urogenital Fang   Final    Chlamydia trachomatis 04/18/2024 Negative  Negative Final    A negative result by transcription mediated amplification does not preclude the presence of C. trachomatis infection because results are dependent on proper and adequate collection, absence of inhibitors and  sufficient rRNA to be detected.    Neisseria gonorrhoeae 04/18/2024 Negative  Negative Final    Negative for N. gonorrhoeae rRNA by transcription mediated amplification. A negative result by transcription mediated amplification does not preclude the presence of C. trachomatis infection because results are dependent on proper and adequate collection, absence of inhibitors and sufficient rRNA to be detected.    Color Urine 04/18/2024 Yellow  Colorless, Straw, Light Yellow, Yellow Final    Appearance Urine 04/18/2024 Clear  Clear Final    Glucose Urine 04/18/2024 Negative  Negative mg/dL Final    Bilirubin Urine 04/18/2024 Negative  Negative Final    Ketones Urine 04/18/2024 Negative  Negative mg/dL Final    Specific Gravity Urine 04/18/2024 1.025  1.005 - 1.030 Final    Blood Urine 04/18/2024 Negative  Negative Final    pH Urine 04/18/2024 7.5  5.0 - 8.0 Final    Protein Albumin Urine 04/18/2024 Negative  Negative mg/dL Final    Urobilinogen Urine 04/18/2024 0.2  0.2, 1.0 E.U./dL Final    Nitrite Urine 04/18/2024 Negative  Negative Final    Leukocyte Esterase Urine 04/18/2024 Trace (A)  Negative Final    Bacteria Urine 04/18/2024 Few (A)  None Seen /HPF Final    RBC Urine 04/18/2024 0-2  0-2 /HPF /HPF Final    WBC Urine 04/18/2024 0-5  0-5 /HPF /HPF Final    Squamous Epithelials Urine 04/18/2024 Few (A)  None Seen /LPF Final    Amorphous Crystals Urine 04/18/2024 Moderate (A)  None Seen /HPF Final   Office Visit on 03/12/2024   Component Date Value Ref Range Status    hCG Urine Qualitative 03/12/2024 Positive (A)  Negative Final    This test is for screening purposes.  Results should be interpreted along with the clinical picture.  Confirmation testing is available if warranted by ordering [id number], HCG Quantitative Pregnancy.       Prenatal Labs  Blood type: O pos  GBS positive 5 weeks ago - swab done yesterday and still in process   Rubella immune, Hep B negative, HIV negative, RPR non-reactive   GC / CT negative   1  hr GCT passed     Impression:  Christa Erickson is a 28 year old year old at 40w5d weeks admitted for induction of labor, pregnancy complicated by obesity, asthma, anxiety. Fetal status reassuring. Prenatal labs WNL.      Plan:  1. Induction of Labor   Cervical ripening with cervidil - will be removed tonight, nurse to check cervix and then call me with update; depending on pt/fetal status would consider further ripening with PO cytotec   GBS positive 5 weeks ago - swab done yesterday and still in process; if pt delivering before result returns will treat with PCN  2. Fetal status   cEFM w/toco   Baby girl  3. Postpartum   plans to breastfeed    Emily Avalos MD  [facility]

## 2024-11-22 PROCEDURE — 250N000013 HC RX MED GY IP 250 OP 250 PS 637: Performed by: FAMILY MEDICINE

## 2024-11-22 PROCEDURE — 120N000013 HC R&B IMCU

## 2024-11-22 PROCEDURE — 99231 SBSQ HOSP IP/OBS SF/LOW 25: CPT | Performed by: FAMILY MEDICINE

## 2024-11-22 RX ORDER — MISOPROSTOL 100 UG/1
25 TABLET ORAL
Status: COMPLETED | OUTPATIENT
Start: 2024-11-22 | End: 2024-11-23

## 2024-11-22 RX ORDER — CITALOPRAM HYDROBROMIDE 20 MG/1
20 TABLET ORAL DAILY
Status: DISCONTINUED | OUTPATIENT
Start: 2024-11-22 | End: 2024-11-27 | Stop reason: HOSPADM

## 2024-11-22 RX ORDER — PRENATAL VIT/IRON FUM/FOLIC AC 27MG-0.8MG
1 TABLET ORAL DAILY
Status: DISCONTINUED | OUTPATIENT
Start: 2024-11-22 | End: 2024-11-27 | Stop reason: HOSPADM

## 2024-11-22 RX ADMIN — MISOPROSTOL 25 MCG: 100 TABLET ORAL at 15:58

## 2024-11-22 RX ADMIN — MISOPROSTOL 25 MCG: 100 TABLET ORAL at 08:04

## 2024-11-22 RX ADMIN — MISOPROSTOL 25 MCG: 100 TABLET ORAL at 10:00

## 2024-11-22 RX ADMIN — PRENATAL VIT W/ FE FUMARATE-FA TAB 27-0.8 MG 1 TABLET: 27-0.8 TAB at 21:02

## 2024-11-22 RX ADMIN — CITALOPRAM HYDROBROMIDE 20 MG: 20 TABLET ORAL at 21:02

## 2024-11-22 RX ADMIN — MISOPROSTOL 25 MCG: 100 TABLET ORAL at 14:03

## 2024-11-22 RX ADMIN — MISOPROSTOL 25 MCG: 100 TABLET ORAL at 18:03

## 2024-11-22 RX ADMIN — MISOPROSTOL 25 MCG: 100 TABLET ORAL at 12:06

## 2024-11-22 RX ADMIN — MISOPROSTOL 25 MCG: 100 TABLET ORAL at 22:35

## 2024-11-22 RX ADMIN — MISOPROSTOL 25 MCG: 100 TABLET ORAL at 20:06

## 2024-11-22 NOTE — PLAN OF CARE
Goal Outcome Evaluation:      Plan of Care Reviewed With: patient, spouse    Overall Patient Progress: improvingOverall Patient Progress: improving    Outcome Evaluation: Able to make needs known. VSS on room air. up independent. last cervical exam 1/50/-2 inducing with cytotec    Report given to Loretta WALSH RN

## 2024-11-22 NOTE — PLAN OF CARE
Problem: Adult Inpatient Plan of Care  Goal: Plan of Care Review  Description: The Plan of Care Review/Shift note should be completed every shift.  The Outcome Evaluation is a brief statement about your assessment that the patient is improving, declining, or no change.  This information will be displayed automatically on your shift  note.  Outcome: Not Progressing  Flowsheets (Taken 11/22/2024 2353)  Outcome Evaluation: Patient slept overnight, continue to monitor and reevaluate this morning with plan for induction. VSS, cat 1 tracing, contractions every 3-4min  Plan of Care Reviewed With: patient  Overall Patient Progress: no change   Goal Outcome Evaluation:      Plan of Care Reviewed With: patient    Overall Patient Progress: no changeOverall Patient Progress: no change    Outcome Evaluation: Patient slept overnight, continue to monitor and reevaluate this morning with plan for induction. VSS, cat 1 tracing, contractions every 3-4min

## 2024-11-22 NOTE — PROGRESS NOTES
Notified MD at 1937PM regarding  uterine tachysystole .      Spoke with: Dr Avalos    Orders  removal of cervidil continue to monitor .

## 2024-11-22 NOTE — PROGRESS NOTES
Labor Progress Note    Subjective:  Christa Erickson is a 28 year old yo  who was admitted for induction for obesity and postdates. Pt did cervidil yesterday, it was removed a little early given occasional decelerations and uterine tachysystole. Pt rested overnight - started PO cytotec this morning, has gotten 4 doses so far. Pt overall feeling well, mild pain/cramping in back.     Objective:  Temp:  [97.8  F (36.6  C)-98.8  F (37.1  C)] 97.8  F (36.6  C)  Resp:  [16-18] 17  BP: (115-139)/(60-80) 115/68  SpO2:  [98 %] 98 %    Cervix: 1/50/-2 per last nurse check  Membranes: are intact  FHT: 130 bpm, moderate variability, +accels, occasional variable decels  Contractions: every 2-4 mins    Impression/Plan:  Christa Erickson is a 28 year old year old at 40w6d weeks here for induction for obesity and postdates.   -continue PO cytotec  -GBS positive - will do PCN once in active labor    Emily Avalos MD

## 2024-11-23 ENCOUNTER — ANESTHESIA (OUTPATIENT)
Dept: OBGYN | Facility: HOSPITAL | Age: 29
End: 2024-11-23
Payer: COMMERCIAL

## 2024-11-23 ENCOUNTER — ANESTHESIA EVENT (OUTPATIENT)
Dept: OBGYN | Facility: HOSPITAL | Age: 29
End: 2024-11-23
Payer: COMMERCIAL

## 2024-11-23 PROCEDURE — 250N000013 HC RX MED GY IP 250 OP 250 PS 637: Performed by: FAMILY MEDICINE

## 2024-11-23 PROCEDURE — 120N000013 HC R&B IMCU

## 2024-11-23 PROCEDURE — 250N000011 HC RX IP 250 OP 636: Performed by: FAMILY MEDICINE

## 2024-11-23 PROCEDURE — 250N000011 HC RX IP 250 OP 636: Performed by: ANESTHESIOLOGY

## 2024-11-23 PROCEDURE — 59510 CESAREAN DELIVERY: CPT | Performed by: ANESTHESIOLOGY

## 2024-11-23 PROCEDURE — 250N000009 HC RX 250: Performed by: FAMILY MEDICINE

## 2024-11-23 PROCEDURE — 3E033VJ INTRODUCTION OF OTHER HORMONE INTO PERIPHERAL VEIN, PERCUTANEOUS APPROACH: ICD-10-PCS | Performed by: FAMILY MEDICINE

## 2024-11-23 PROCEDURE — 62322 NJX INTERLAMINAR LMBR/SAC: CPT | Mod: 59 | Performed by: STUDENT IN AN ORGANIZED HEALTH CARE EDUCATION/TRAINING PROGRAM

## 2024-11-23 PROCEDURE — 10907ZC DRAINAGE OF AMNIOTIC FLUID, THERAPEUTIC FROM PRODUCTS OF CONCEPTION, VIA NATURAL OR ARTIFICIAL OPENING: ICD-10-PCS | Performed by: FAMILY MEDICINE

## 2024-11-23 PROCEDURE — 258N000003 HC RX IP 258 OP 636: Performed by: FAMILY MEDICINE

## 2024-11-23 PROCEDURE — 59510 CESAREAN DELIVERY: CPT

## 2024-11-23 PROCEDURE — 99231 SBSQ HOSP IP/OBS SF/LOW 25: CPT | Performed by: FAMILY MEDICINE

## 2024-11-23 RX ORDER — SODIUM CHLORIDE, SODIUM LACTATE, POTASSIUM CHLORIDE, CALCIUM CHLORIDE 600; 310; 30; 20 MG/100ML; MG/100ML; MG/100ML; MG/100ML
INJECTION, SOLUTION INTRAVENOUS CONTINUOUS PRN
Status: DISCONTINUED | OUTPATIENT
Start: 2024-11-23 | End: 2024-11-24 | Stop reason: HOSPADM

## 2024-11-23 RX ORDER — FENTANYL CITRATE-0.9 % NACL/PF 10 MCG/ML
100 PLASTIC BAG, INJECTION (ML) INTRAVENOUS EVERY 5 MIN PRN
Status: DISCONTINUED | OUTPATIENT
Start: 2024-11-23 | End: 2024-11-24 | Stop reason: HOSPADM

## 2024-11-23 RX ORDER — OXYTOCIN/0.9 % SODIUM CHLORIDE 30/500 ML
1-24 PLASTIC BAG, INJECTION (ML) INTRAVENOUS CONTINUOUS
Status: DISCONTINUED | OUTPATIENT
Start: 2024-11-23 | End: 2024-11-24 | Stop reason: HOSPADM

## 2024-11-23 RX ORDER — BUPIVACAINE HYDROCHLORIDE 2.5 MG/ML
INJECTION, SOLUTION EPIDURAL; INFILTRATION; INTRACAUDAL
Status: COMPLETED | OUTPATIENT
Start: 2024-11-23 | End: 2024-11-24

## 2024-11-23 RX ORDER — LORATADINE 10 MG/1
10 TABLET ORAL DAILY
Status: DISCONTINUED | OUTPATIENT
Start: 2024-11-23 | End: 2024-11-23

## 2024-11-23 RX ORDER — TERBUTALINE SULFATE 1 MG/ML
0.25 INJECTION, SOLUTION SUBCUTANEOUS
Status: DISCONTINUED | OUTPATIENT
Start: 2024-11-23 | End: 2024-11-24 | Stop reason: HOSPADM

## 2024-11-23 RX ORDER — NALBUPHINE HYDROCHLORIDE 20 MG/ML
2.5-5 INJECTION, SOLUTION INTRAMUSCULAR; INTRAVENOUS; SUBCUTANEOUS EVERY 6 HOURS PRN
Status: DISCONTINUED | OUTPATIENT
Start: 2024-11-23 | End: 2024-11-27 | Stop reason: HOSPADM

## 2024-11-23 RX ORDER — FENTANYL CITRATE 50 UG/ML
100 INJECTION, SOLUTION INTRAMUSCULAR; INTRAVENOUS
Status: DISCONTINUED | OUTPATIENT
Start: 2024-11-23 | End: 2024-11-27 | Stop reason: HOSPADM

## 2024-11-23 RX ORDER — FENTANYL/ROPIVACAINE/NS/PF 2MCG/ML-.1
PLASTIC BAG, INJECTION (ML) EPIDURAL
Status: DISCONTINUED | OUTPATIENT
Start: 2024-11-23 | End: 2024-11-24 | Stop reason: HOSPADM

## 2024-11-23 RX ORDER — LORATADINE 10 MG/1
10 TABLET ORAL DAILY PRN
Status: DISCONTINUED | OUTPATIENT
Start: 2024-11-23 | End: 2024-11-27 | Stop reason: HOSPADM

## 2024-11-23 RX ADMIN — SODIUM CHLORIDE, POTASSIUM CHLORIDE, SODIUM LACTATE AND CALCIUM CHLORIDE: 600; 310; 30; 20 INJECTION, SOLUTION INTRAVENOUS at 15:24

## 2024-11-23 RX ADMIN — BUPIVACAINE HYDROCHLORIDE 8 ML: 2.5 INJECTION, SOLUTION EPIDURAL; INFILTRATION; INTRACAUDAL at 23:20

## 2024-11-23 RX ADMIN — FENTANYL CITRATE 100 MCG: 50 INJECTION, SOLUTION INTRAMUSCULAR; INTRAVENOUS at 11:22

## 2024-11-23 RX ADMIN — METOCLOPRAMIDE 10 MG: 5 INJECTION, SOLUTION INTRAMUSCULAR; INTRAVENOUS at 06:22

## 2024-11-23 RX ADMIN — CITALOPRAM HYDROBROMIDE 20 MG: 20 TABLET ORAL at 21:39

## 2024-11-23 RX ADMIN — MISOPROSTOL 25 MCG: 100 TABLET ORAL at 06:04

## 2024-11-23 RX ADMIN — LORATADINE 10 MG: 10 TABLET ORAL at 01:26

## 2024-11-23 RX ADMIN — Medication: at 23:21

## 2024-11-23 RX ADMIN — PRENATAL VIT W/ FE FUMARATE-FA TAB 27-0.8 MG 1 TABLET: 27-0.8 TAB at 21:39

## 2024-11-23 RX ADMIN — FENTANYL CITRATE 100 MCG: 50 INJECTION, SOLUTION INTRAMUSCULAR; INTRAVENOUS at 14:08

## 2024-11-23 RX ADMIN — Medication 1 MILLI-UNITS/MIN: at 15:24

## 2024-11-23 RX ADMIN — MISOPROSTOL 25 MCG: 100 TABLET ORAL at 03:47

## 2024-11-23 RX ADMIN — MISOPROSTOL 25 MCG: 100 TABLET ORAL at 01:16

## 2024-11-23 RX ADMIN — ONDANSETRON 4 MG: 2 INJECTION INTRAMUSCULAR; INTRAVENOUS at 08:14

## 2024-11-23 NOTE — PROVIDER NOTIFICATION
11/23/24 0825   Provider Notification   Provider Name/Title Dr. Avalos   Method of Notification Phone   Request Evaluate - Remote   Notification Reason SVE;Status Update;Labor Status     Dr. Avalos notified of SVE 1.5/60/-2. Plan for in house OB to place cook balloon. Patient and  updated with plan of care. No questions or concerns at this time. OK for break from EFM for shower, breakfast and walk on unit.

## 2024-11-23 NOTE — PLAN OF CARE
Problem: Adult Inpatient Plan of Care  Goal: Plan of Care Review  Description: The Plan of Care Review/Shift note should be completed every shift.  The Outcome Evaluation is a brief statement about your assessment that the patient is improving, declining, or no change.  This information will be displayed automatically on your shift  note.  11/23/2024 0626 by Maryuri Madrid, RN  Outcome: Progressing  Flowsheets (Taken 11/23/2024 0626)  Outcome Evaluation: Continue with current plan of cytotec every two hours. Patient unaware of contractions and able to rest in bewteeen cytotec.  Pt to call nurse as needed.  Plan of Care Reviewed With: patient  Overall Patient Progress: no change  11/23/2024 0137 by Maryuri Madrid RN  Outcome: Progressing  Flowsheets (Taken 11/23/2024 0137)  Outcome Evaluation: Patient continue with Cytotec every 2 hours as able. Per patient, she feels cramping and tolerable. Patient is able to sleep during cramping.  Patient encouraged to call nurse as needed.  Plan of Care Reviewed With: patient  Overall Patient Progress: improving   Goal Outcome Evaluation:      Plan of Care Reviewed With: patient    Overall Patient Progress: no changeOverall Patient Progress: no change    Outcome Evaluation: Continue with current plan of cytotec every two hours. Patient unaware of contractions and able to rest in bewteeen cytotec.  Pt to call nurse as needed.

## 2024-11-23 NOTE — PLAN OF CARE
Goal Outcome Evaluation:      Plan of Care Reviewed With: patient    Overall Patient Progress: improvingOverall Patient Progress: improving    Outcome Evaluation: VSS. Patient given break off monitor to shower and ambulate in hallway. Cook balloon placed at 0922. Patient reports increase in intensity of contractions after balloon placement. Fentanyl given x2.  at bedside supportive of patient.

## 2024-11-23 NOTE — CONSULTS
Consulted by Dr. Avalos to place cervical ripening catheter for labor induction.   Discussed can be in for 12 hours, may fall out sooner.         Procedure note: Cook cervical ripening catheter placement    Procedure date: 11/23/2024    Procedure: The options for labor induction were discussed with the patient and the patient was agreeable to Cook cervical ripening catheter placement. Patient's fetus was confirmed to be vertex.    Sterile gloves were donned. The stylet was placed in the stylet port of the Cook catheter. Sterile digital cervical exam performed and Cook catheter advanced into the cervical os so that the internal balloon was just inside the internal os. The uterine balloon was inflated to 60 mL with sterile saline. The device was placed to traction.    The patient tolerated the procedure well. The catheter will be removed after 12 hours if not expelled prior.    Meggan Alaniz MD

## 2024-11-23 NOTE — PROGRESS NOTES
Labor Progress Note    Subjective:  Christa Erickson is a 28 year old yo  who was admitted for induction for obesity and postdates. Pt did cervidil on Thursday, it was removed a little early given occasional decelerations and uterine tachysystole. Pt completed 11 doses of cytotec - she vomited 5 mins after last dose. No significant change on last nurse check. Pt otherwise feeling fine, baby category 1.    Objective:  Temp:  [97.8  F (36.6  C)-98.5  F (36.9  C)] 98.1  F (36.7  C)  Resp:  [16-18] 18  BP: (111-118)/(60-72) 115/70  SpO2:  [95 %-98 %] 95 %    Cervix: 1.5/50/-2 per last nurse check  Membranes: are intact  FHT: 125 bpm, moderate variability, +accels, no decels  Contractions: every 2-4 mins    Impression/Plan:  Christa Erickson is a 28 year old year old at 40w6d weeks here for induction for obesity and postdates.   -consult in house OB for placement of mechanical ripening  -GBS positive - will do PCN once in active labor    Emily Avalos MD

## 2024-11-23 NOTE — PROGRESS NOTES
Labor Progress Note    Subjective:  Christa Erickson is a 28 year old yo  who was admitted for induction for obesity and postdates. Pt did cervidil on Thursday, it was removed a little early given occasional decelerations and uterine tachysystole. Pt completed 11 doses of cytotec - she vomited 5 mins after last dose. In house OB placed mechanical ripening catheter this morning around 9:30am. Pt has had pain/discomfort from this - some thinning on last nurse check but still 1 cm dilated.     Objective:  Temp:  [98.1  F (36.7  C)-98.5  F (36.9  C)] 98.2  F (36.8  C)  Resp:  [16-18] 18  BP: (111-134)/(65-75) 134/75  SpO2:  [95 %-97 %] 96 %    Cervix: 1/60/-2 per last nurse check  Membranes: are intact  FHT: 125 bpm, moderate variability, +accels, no decels  Contractions: every 4-6 mins    Impression/Plan:  Christa Erickson is a 28 year old year old at 40w6d weeks here for induction for obesity and postdates.   -cook catheter in place for mechanical ripening - will add low dose pitocin per evidence based cervical ripening practices  -GBS positive - will do PCN once in active labor  -pt planning epidural    Emily Avalos MD

## 2024-11-23 NOTE — PLAN OF CARE
Problem: Adult Inpatient Plan of Care  Goal: Plan of Care Review  Description: The Plan of Care Review/Shift note should be completed every shift.  The Outcome Evaluation is a brief statement about your assessment that the patient is improving, declining, or no change.  This information will be displayed automatically on your shift  note.  Outcome: Progressing  Flowsheets (Taken 11/23/2024 0137)  Outcome Evaluation: Patient continue with Cytotec every 2 hours as able. Per patient, she feels cramping and tolerable. Patient is able to sleep during cramping.  Patient encouraged to call nurse as needed.  Plan of Care Reviewed With: patient  Overall Patient Progress: improving  Goal: Optimal Comfort and Wellbeing  Intervention: Monitor Pain and Promote Comfort  Recent Flowsheet Documentation  Taken 11/22/2024 2004 by Maryuri Madrid RN  Pain Management Interventions: declines  Intervention: Provide Person-Centered Care  Recent Flowsheet Documentation  Taken 11/23/2024 0115 by Maryuri Madrid RN  Trust Relationship/Rapport:   care explained   choices provided   emotional support provided   empathic listening provided   questions answered   questions encouraged   reassurance provided   thoughts/feelings acknowledged  Taken 11/22/2024 2004 by Maryuri Madrid RN  Trust Relationship/Rapport:   care explained   choices provided   emotional support provided   empathic listening provided   questions answered   questions encouraged   reassurance provided   thoughts/feelings acknowledged   Goal Outcome Evaluation:      Plan of Care Reviewed With: patient    Overall Patient Progress: improvingOverall Patient Progress: improving    Outcome Evaluation: Patient continue with Cytotec every 2 hours as able. Per patient, she feels cramping and tolerable. Patient is able to sleep during cramping.  Patient encouraged to call nurse as needed.

## 2024-11-24 LAB
ABO/RH(D): NORMAL
ANTIBODY SCREEN: NEGATIVE
APTT PPP: 29 SECONDS (ref 22–38)
D DIMER PPP FEU-MCNC: 1.45 UG/ML FEU (ref 0–0.5)
FIBRINOGEN PPP-MCNC: 490 MG/DL (ref 170–510)
HGB BLD-MCNC: 13.2 G/DL (ref 11.7–15.7)
HOLD SPECIMEN: NORMAL
HOLD SPECIMEN: NORMAL
INR PPP: 1.03 (ref 0.85–1.15)
PLATELET # BLD AUTO: 163 10E3/UL (ref 150–450)
SPECIMEN EXPIRATION DATE: NORMAL

## 2024-11-24 PROCEDURE — 258N000003 HC RX IP 258 OP 636: Performed by: OBSTETRICS & GYNECOLOGY

## 2024-11-24 PROCEDURE — 999N000249 HC STATISTIC C-SECTION ON UNIT: Performed by: OBSTETRICS & GYNECOLOGY

## 2024-11-24 PROCEDURE — 250N000009 HC RX 250: Performed by: STUDENT IN AN ORGANIZED HEALTH CARE EDUCATION/TRAINING PROGRAM

## 2024-11-24 PROCEDURE — 85379 FIBRIN DEGRADATION QUANT: CPT | Performed by: OBSTETRICS & GYNECOLOGY

## 2024-11-24 PROCEDURE — 250N000011 HC RX IP 250 OP 636: Performed by: FAMILY MEDICINE

## 2024-11-24 PROCEDURE — 250N000011 HC RX IP 250 OP 636: Performed by: ANESTHESIOLOGY

## 2024-11-24 PROCEDURE — 120N000013 HC R&B IMCU

## 2024-11-24 PROCEDURE — 250N000011 HC RX IP 250 OP 636: Performed by: OBSTETRICS & GYNECOLOGY

## 2024-11-24 PROCEDURE — 999N000249 HC STATISTIC C-SECTION ON UNIT

## 2024-11-24 PROCEDURE — 85049 AUTOMATED PLATELET COUNT: CPT | Performed by: OBSTETRICS & GYNECOLOGY

## 2024-11-24 PROCEDURE — 85384 FIBRINOGEN ACTIVITY: CPT | Performed by: OBSTETRICS & GYNECOLOGY

## 2024-11-24 PROCEDURE — 36415 COLL VENOUS BLD VENIPUNCTURE: CPT | Performed by: OBSTETRICS & GYNECOLOGY

## 2024-11-24 PROCEDURE — 86780 TREPONEMA PALLIDUM: CPT | Performed by: OBSTETRICS & GYNECOLOGY

## 2024-11-24 PROCEDURE — 370N000017 HC ANESTHESIA TECHNICAL FEE, PER MIN: Performed by: OBSTETRICS & GYNECOLOGY

## 2024-11-24 PROCEDURE — 250N000009 HC RX 250: Performed by: OBSTETRICS & GYNECOLOGY

## 2024-11-24 PROCEDURE — 86900 BLOOD TYPING SEROLOGIC ABO: CPT | Performed by: OBSTETRICS & GYNECOLOGY

## 2024-11-24 PROCEDURE — 258N000003 HC RX IP 258 OP 636: Performed by: FAMILY MEDICINE

## 2024-11-24 PROCEDURE — 258N000003 HC RX IP 258 OP 636: Performed by: STUDENT IN AN ORGANIZED HEALTH CARE EDUCATION/TRAINING PROGRAM

## 2024-11-24 PROCEDURE — 250N000013 HC RX MED GY IP 250 OP 250 PS 637: Performed by: OBSTETRICS & GYNECOLOGY

## 2024-11-24 PROCEDURE — 85610 PROTHROMBIN TIME: CPT | Performed by: OBSTETRICS & GYNECOLOGY

## 2024-11-24 PROCEDURE — 360N000076 HC SURGERY LEVEL 3, PER MIN: Performed by: OBSTETRICS & GYNECOLOGY

## 2024-11-24 PROCEDURE — 250N000011 HC RX IP 250 OP 636: Performed by: STUDENT IN AN ORGANIZED HEALTH CARE EDUCATION/TRAINING PROGRAM

## 2024-11-24 PROCEDURE — 85730 THROMBOPLASTIN TIME PARTIAL: CPT | Performed by: OBSTETRICS & GYNECOLOGY

## 2024-11-24 PROCEDURE — 710N000010 HC RECOVERY PHASE 1, LEVEL 2, PER MIN: Performed by: OBSTETRICS & GYNECOLOGY

## 2024-11-24 PROCEDURE — 85018 HEMOGLOBIN: CPT | Performed by: OBSTETRICS & GYNECOLOGY

## 2024-11-24 PROCEDURE — 272N000001 HC OR GENERAL SUPPLY STERILE: Performed by: OBSTETRICS & GYNECOLOGY

## 2024-11-24 PROCEDURE — 999N000016 HC STATISTIC ATTENDANCE AT DELIVERY

## 2024-11-24 PROCEDURE — 250N000013 HC RX MED GY IP 250 OP 250 PS 637: Performed by: FAMILY MEDICINE

## 2024-11-24 RX ORDER — TRANEXAMIC ACID 10 MG/ML
1 INJECTION, SOLUTION INTRAVENOUS EVERY 30 MIN PRN
Status: DISCONTINUED | OUTPATIENT
Start: 2024-11-24 | End: 2024-11-24 | Stop reason: HOSPADM

## 2024-11-24 RX ORDER — DEXTROSE, SODIUM CHLORIDE, SODIUM LACTATE, POTASSIUM CHLORIDE, AND CALCIUM CHLORIDE 5; .6; .31; .03; .02 G/100ML; G/100ML; G/100ML; G/100ML; G/100ML
INJECTION, SOLUTION INTRAVENOUS CONTINUOUS
Status: DISCONTINUED | OUTPATIENT
Start: 2024-11-24 | End: 2024-11-27

## 2024-11-24 RX ORDER — CEFAZOLIN SODIUM/WATER 2 G/20 ML
2 SYRINGE (ML) INTRAVENOUS SEE ADMIN INSTRUCTIONS
Status: DISCONTINUED | OUTPATIENT
Start: 2024-11-24 | End: 2024-11-24 | Stop reason: HOSPADM

## 2024-11-24 RX ORDER — ONDANSETRON 2 MG/ML
4 INJECTION INTRAMUSCULAR; INTRAVENOUS EVERY 6 HOURS PRN
Status: DISCONTINUED | OUTPATIENT
Start: 2024-11-24 | End: 2024-11-27 | Stop reason: HOSPADM

## 2024-11-24 RX ORDER — MORPHINE SULFATE 1 MG/ML
INJECTION, SOLUTION EPIDURAL; INTRATHECAL; INTRAVENOUS
Status: COMPLETED | OUTPATIENT
Start: 2024-11-24 | End: 2024-11-24

## 2024-11-24 RX ORDER — SODIUM PHOSPHATE,MONO-DIBASIC 19G-7G/118
1 ENEMA (ML) RECTAL DAILY PRN
Status: DISCONTINUED | OUTPATIENT
Start: 2024-11-26 | End: 2024-11-27 | Stop reason: HOSPADM

## 2024-11-24 RX ORDER — MISOPROSTOL 200 UG/1
800 TABLET ORAL
Status: DISCONTINUED | OUTPATIENT
Start: 2024-11-24 | End: 2024-11-27 | Stop reason: HOSPADM

## 2024-11-24 RX ORDER — METOCLOPRAMIDE 10 MG/1
10 TABLET ORAL EVERY 6 HOURS PRN
Status: DISCONTINUED | OUTPATIENT
Start: 2024-11-24 | End: 2024-11-27 | Stop reason: HOSPADM

## 2024-11-24 RX ORDER — SODIUM CHLORIDE, SODIUM LACTATE, POTASSIUM CHLORIDE, CALCIUM CHLORIDE 600; 310; 30; 20 MG/100ML; MG/100ML; MG/100ML; MG/100ML
INJECTION, SOLUTION INTRAVENOUS CONTINUOUS
Status: DISCONTINUED | OUTPATIENT
Start: 2024-11-24 | End: 2024-11-24 | Stop reason: HOSPADM

## 2024-11-24 RX ORDER — CARBOPROST TROMETHAMINE 250 UG/ML
250 INJECTION, SOLUTION INTRAMUSCULAR
Status: DISCONTINUED | OUTPATIENT
Start: 2024-11-24 | End: 2024-11-27 | Stop reason: HOSPADM

## 2024-11-24 RX ORDER — HYDROCORTISONE 25 MG/G
CREAM TOPICAL 3 TIMES DAILY PRN
Status: DISCONTINUED | OUTPATIENT
Start: 2024-11-24 | End: 2024-11-27 | Stop reason: HOSPADM

## 2024-11-24 RX ORDER — OXYTOCIN/0.9 % SODIUM CHLORIDE 30/500 ML
100-340 PLASTIC BAG, INJECTION (ML) INTRAVENOUS CONTINUOUS PRN
Status: DISCONTINUED | OUTPATIENT
Start: 2024-11-24 | End: 2024-11-27 | Stop reason: HOSPADM

## 2024-11-24 RX ORDER — CARBOPROST TROMETHAMINE 250 UG/ML
250 INJECTION, SOLUTION INTRAMUSCULAR
Status: DISCONTINUED | OUTPATIENT
Start: 2024-11-24 | End: 2024-11-24 | Stop reason: HOSPADM

## 2024-11-24 RX ORDER — OXYTOCIN 10 [USP'U]/ML
10 INJECTION, SOLUTION INTRAMUSCULAR; INTRAVENOUS
Status: DISCONTINUED | OUTPATIENT
Start: 2024-11-24 | End: 2024-11-27 | Stop reason: HOSPADM

## 2024-11-24 RX ORDER — MODIFIED LANOLIN
OINTMENT (GRAM) TOPICAL
Status: DISCONTINUED | OUTPATIENT
Start: 2024-11-24 | End: 2024-11-27 | Stop reason: HOSPADM

## 2024-11-24 RX ORDER — LOPERAMIDE HYDROCHLORIDE 2 MG/1
2 CAPSULE ORAL
Status: DISCONTINUED | OUTPATIENT
Start: 2024-11-24 | End: 2024-11-27 | Stop reason: HOSPADM

## 2024-11-24 RX ORDER — NALOXONE HYDROCHLORIDE 0.4 MG/ML
0.2 INJECTION, SOLUTION INTRAMUSCULAR; INTRAVENOUS; SUBCUTANEOUS
Status: DISCONTINUED | OUTPATIENT
Start: 2024-11-24 | End: 2024-11-27 | Stop reason: HOSPADM

## 2024-11-24 RX ORDER — MISOPROSTOL 200 UG/1
800 TABLET ORAL
Status: DISCONTINUED | OUTPATIENT
Start: 2024-11-24 | End: 2024-11-24 | Stop reason: HOSPADM

## 2024-11-24 RX ORDER — SODIUM CHLORIDE, SODIUM LACTATE, POTASSIUM CHLORIDE, CALCIUM CHLORIDE 600; 310; 30; 20 MG/100ML; MG/100ML; MG/100ML; MG/100ML
INJECTION, SOLUTION INTRAVENOUS CONTINUOUS
Status: DISCONTINUED | OUTPATIENT
Start: 2024-11-24 | End: 2024-11-27

## 2024-11-24 RX ORDER — ONDANSETRON 4 MG/1
4 TABLET, ORALLY DISINTEGRATING ORAL EVERY 6 HOURS PRN
Status: DISCONTINUED | OUTPATIENT
Start: 2024-11-24 | End: 2024-11-27 | Stop reason: HOSPADM

## 2024-11-24 RX ORDER — KETOROLAC TROMETHAMINE 30 MG/ML
30 INJECTION, SOLUTION INTRAMUSCULAR; INTRAVENOUS EVERY 6 HOURS
Status: COMPLETED | OUTPATIENT
Start: 2024-11-24 | End: 2024-11-25

## 2024-11-24 RX ORDER — LIDOCAINE 40 MG/G
CREAM TOPICAL
Status: DISCONTINUED | OUTPATIENT
Start: 2024-11-24 | End: 2024-11-24 | Stop reason: HOSPADM

## 2024-11-24 RX ORDER — CEFAZOLIN SODIUM/WATER 2 G/20 ML
2 SYRINGE (ML) INTRAVENOUS
Status: COMPLETED | OUTPATIENT
Start: 2024-11-24 | End: 2024-11-24

## 2024-11-24 RX ORDER — SODIUM CHLORIDE, SODIUM LACTATE, POTASSIUM CHLORIDE, CALCIUM CHLORIDE 600; 310; 30; 20 MG/100ML; MG/100ML; MG/100ML; MG/100ML
INJECTION, SOLUTION INTRAVENOUS CONTINUOUS PRN
Status: DISCONTINUED | OUTPATIENT
Start: 2024-11-24 | End: 2024-11-24

## 2024-11-24 RX ORDER — MISOPROSTOL 200 UG/1
400 TABLET ORAL
Status: DISCONTINUED | OUTPATIENT
Start: 2024-11-24 | End: 2024-11-27 | Stop reason: HOSPADM

## 2024-11-24 RX ORDER — BISACODYL 10 MG
10 SUPPOSITORY, RECTAL RECTAL DAILY PRN
Status: DISCONTINUED | OUTPATIENT
Start: 2024-11-26 | End: 2024-11-27 | Stop reason: HOSPADM

## 2024-11-24 RX ORDER — MISOPROSTOL 200 UG/1
400 TABLET ORAL
Status: DISCONTINUED | OUTPATIENT
Start: 2024-11-24 | End: 2024-11-24 | Stop reason: HOSPADM

## 2024-11-24 RX ORDER — LOPERAMIDE HYDROCHLORIDE 2 MG/1
4 CAPSULE ORAL
Status: DISCONTINUED | OUTPATIENT
Start: 2024-11-24 | End: 2024-11-27 | Stop reason: HOSPADM

## 2024-11-24 RX ORDER — ONDANSETRON 2 MG/ML
INJECTION INTRAMUSCULAR; INTRAVENOUS PRN
Status: DISCONTINUED | OUTPATIENT
Start: 2024-11-24 | End: 2024-11-24

## 2024-11-24 RX ORDER — OXYTOCIN 10 [USP'U]/ML
INJECTION, SOLUTION INTRAMUSCULAR; INTRAVENOUS PRN
Status: DISCONTINUED | OUTPATIENT
Start: 2024-11-24 | End: 2024-11-27 | Stop reason: HOSPADM

## 2024-11-24 RX ORDER — NALOXONE HYDROCHLORIDE 0.4 MG/ML
0.4 INJECTION, SOLUTION INTRAMUSCULAR; INTRAVENOUS; SUBCUTANEOUS
Status: DISCONTINUED | OUTPATIENT
Start: 2024-11-24 | End: 2024-11-27 | Stop reason: HOSPADM

## 2024-11-24 RX ORDER — FENTANYL CITRATE 50 UG/ML
INJECTION, SOLUTION INTRAMUSCULAR; INTRAVENOUS PRN
Status: DISCONTINUED | OUTPATIENT
Start: 2024-11-24 | End: 2024-11-24

## 2024-11-24 RX ORDER — LIDOCAINE 40 MG/G
CREAM TOPICAL
Status: DISCONTINUED | OUTPATIENT
Start: 2024-11-24 | End: 2024-11-27 | Stop reason: HOSPADM

## 2024-11-24 RX ORDER — ACETAMINOPHEN 325 MG/1
975 TABLET ORAL ONCE
Status: COMPLETED | OUTPATIENT
Start: 2024-11-24 | End: 2024-11-24

## 2024-11-24 RX ORDER — LOPERAMIDE HYDROCHLORIDE 2 MG/1
2 CAPSULE ORAL
Status: DISCONTINUED | OUTPATIENT
Start: 2024-11-24 | End: 2024-11-24 | Stop reason: HOSPADM

## 2024-11-24 RX ORDER — METHYLERGONOVINE MALEATE 0.2 MG/ML
200 INJECTION INTRAVENOUS
Status: DISCONTINUED | OUTPATIENT
Start: 2024-11-24 | End: 2024-11-27 | Stop reason: HOSPADM

## 2024-11-24 RX ORDER — TRANEXAMIC ACID 10 MG/ML
1 INJECTION, SOLUTION INTRAVENOUS EVERY 30 MIN PRN
Status: DISCONTINUED | OUTPATIENT
Start: 2024-11-24 | End: 2024-11-27 | Stop reason: HOSPADM

## 2024-11-24 RX ORDER — OXYCODONE HYDROCHLORIDE 5 MG/1
5 TABLET ORAL EVERY 4 HOURS PRN
Status: DISCONTINUED | OUTPATIENT
Start: 2024-11-24 | End: 2024-11-27 | Stop reason: HOSPADM

## 2024-11-24 RX ORDER — DEXAMETHASONE SODIUM PHOSPHATE 10 MG/ML
INJECTION, SOLUTION INTRAMUSCULAR; INTRAVENOUS PRN
Status: DISCONTINUED | OUTPATIENT
Start: 2024-11-24 | End: 2024-11-24

## 2024-11-24 RX ORDER — FENTANYL CITRATE 50 UG/ML
INJECTION, SOLUTION INTRAMUSCULAR; INTRAVENOUS
Status: COMPLETED | OUTPATIENT
Start: 2024-11-24 | End: 2024-11-24

## 2024-11-24 RX ORDER — OXYTOCIN/0.9 % SODIUM CHLORIDE 30/500 ML
340 PLASTIC BAG, INJECTION (ML) INTRAVENOUS CONTINUOUS PRN
Status: DISCONTINUED | OUTPATIENT
Start: 2024-11-24 | End: 2024-11-24 | Stop reason: HOSPADM

## 2024-11-24 RX ORDER — AMOXICILLIN 250 MG
1 CAPSULE ORAL 2 TIMES DAILY
Status: DISCONTINUED | OUTPATIENT
Start: 2024-11-24 | End: 2024-11-27 | Stop reason: HOSPADM

## 2024-11-24 RX ORDER — OXYTOCIN/0.9 % SODIUM CHLORIDE 30/500 ML
PLASTIC BAG, INJECTION (ML) INTRAVENOUS CONTINUOUS PRN
Status: DISCONTINUED | OUTPATIENT
Start: 2024-11-24 | End: 2024-11-24

## 2024-11-24 RX ORDER — AMOXICILLIN 250 MG
2 CAPSULE ORAL 2 TIMES DAILY
Status: DISCONTINUED | OUTPATIENT
Start: 2024-11-24 | End: 2024-11-27 | Stop reason: HOSPADM

## 2024-11-24 RX ORDER — LOPERAMIDE HYDROCHLORIDE 2 MG/1
4 CAPSULE ORAL
Status: DISCONTINUED | OUTPATIENT
Start: 2024-11-24 | End: 2024-11-24 | Stop reason: HOSPADM

## 2024-11-24 RX ORDER — METHYLERGONOVINE MALEATE 0.2 MG/ML
200 INJECTION INTRAVENOUS
Status: DISCONTINUED | OUTPATIENT
Start: 2024-11-24 | End: 2024-11-24 | Stop reason: HOSPADM

## 2024-11-24 RX ORDER — OXYTOCIN/0.9 % SODIUM CHLORIDE 30/500 ML
340 PLASTIC BAG, INJECTION (ML) INTRAVENOUS CONTINUOUS PRN
Status: DISCONTINUED | OUTPATIENT
Start: 2024-11-24 | End: 2024-11-27 | Stop reason: HOSPADM

## 2024-11-24 RX ORDER — PROCHLORPERAZINE MALEATE 10 MG
10 TABLET ORAL EVERY 6 HOURS PRN
Status: DISCONTINUED | OUTPATIENT
Start: 2024-11-24 | End: 2024-11-27 | Stop reason: HOSPADM

## 2024-11-24 RX ORDER — ACETAMINOPHEN 325 MG/1
975 TABLET ORAL EVERY 6 HOURS
Status: DISCONTINUED | OUTPATIENT
Start: 2024-11-24 | End: 2024-11-27 | Stop reason: HOSPADM

## 2024-11-24 RX ORDER — SIMETHICONE 80 MG
80 TABLET,CHEWABLE ORAL 4 TIMES DAILY PRN
Status: DISCONTINUED | OUTPATIENT
Start: 2024-11-24 | End: 2024-11-27 | Stop reason: HOSPADM

## 2024-11-24 RX ORDER — METOCLOPRAMIDE HYDROCHLORIDE 5 MG/ML
10 INJECTION INTRAMUSCULAR; INTRAVENOUS EVERY 6 HOURS PRN
Status: DISCONTINUED | OUTPATIENT
Start: 2024-11-24 | End: 2024-11-27 | Stop reason: HOSPADM

## 2024-11-24 RX ORDER — CITRIC ACID/SODIUM CITRATE 334-500MG
30 SOLUTION, ORAL ORAL
Status: COMPLETED | OUTPATIENT
Start: 2024-11-24 | End: 2024-11-24

## 2024-11-24 RX ORDER — METHYLERGONOVINE MALEATE 0.2 MG/ML
INJECTION INTRAVENOUS PRN
Status: DISCONTINUED | OUTPATIENT
Start: 2024-11-24 | End: 2024-11-24

## 2024-11-24 RX ORDER — BUPIVACAINE HYDROCHLORIDE 7.5 MG/ML
INJECTION, SOLUTION INTRASPINAL
Status: COMPLETED | OUTPATIENT
Start: 2024-11-24 | End: 2024-11-24

## 2024-11-24 RX ORDER — OXYTOCIN 10 [USP'U]/ML
10 INJECTION, SOLUTION INTRAMUSCULAR; INTRAVENOUS
Status: DISCONTINUED | OUTPATIENT
Start: 2024-11-24 | End: 2024-11-24 | Stop reason: HOSPADM

## 2024-11-24 RX ORDER — IBUPROFEN 800 MG/1
800 TABLET, FILM COATED ORAL EVERY 6 HOURS
Status: DISCONTINUED | OUTPATIENT
Start: 2024-11-25 | End: 2024-11-27 | Stop reason: HOSPADM

## 2024-11-24 RX ADMIN — SODIUM CHLORIDE, POTASSIUM CHLORIDE, SODIUM LACTATE AND CALCIUM CHLORIDE: 600; 310; 30; 20 INJECTION, SOLUTION INTRAVENOUS at 09:48

## 2024-11-24 RX ADMIN — SENNOSIDES AND DOCUSATE SODIUM 1 TABLET: 8.6; 5 TABLET ORAL at 21:57

## 2024-11-24 RX ADMIN — PENICILLIN G 3 MILLION UNITS: 3000000 INJECTION, SOLUTION INTRAVENOUS at 04:40

## 2024-11-24 RX ADMIN — ACETAMINOPHEN 975 MG: 325 TABLET ORAL at 08:31

## 2024-11-24 RX ADMIN — TRANEXAMIC ACID 1 G: 10 INJECTION, SOLUTION INTRAVENOUS at 08:57

## 2024-11-24 RX ADMIN — ACETAMINOPHEN 975 MG: 325 TABLET ORAL at 15:56

## 2024-11-24 RX ADMIN — PENICILLIN G POTASSIUM 5 MILLION UNITS: 5000000 POWDER, FOR SOLUTION INTRAMUSCULAR; INTRAPLEURAL; INTRATHECAL; INTRAVENOUS at 00:59

## 2024-11-24 RX ADMIN — FENTANYL CITRATE 50 MCG: 50 INJECTION, SOLUTION INTRAMUSCULAR; INTRAVENOUS at 09:45

## 2024-11-24 RX ADMIN — Medication 2 G: at 08:40

## 2024-11-24 RX ADMIN — ACETAMINOPHEN 975 MG: 325 TABLET ORAL at 21:57

## 2024-11-24 RX ADMIN — SODIUM CHLORIDE, POTASSIUM CHLORIDE, SODIUM LACTATE AND CALCIUM CHLORIDE: 600; 310; 30; 20 INJECTION, SOLUTION INTRAVENOUS at 08:54

## 2024-11-24 RX ADMIN — FENTANYL CITRATE 15 MCG: 50 INJECTION, SOLUTION INTRAMUSCULAR; INTRAVENOUS at 08:45

## 2024-11-24 RX ADMIN — FENTANYL CITRATE 50 MCG: 50 INJECTION, SOLUTION INTRAMUSCULAR; INTRAVENOUS at 09:48

## 2024-11-24 RX ADMIN — BUPIVACAINE HYDROCHLORIDE IN DEXTROSE 1.6 ML: 7.5 INJECTION, SOLUTION SUBARACHNOID at 08:45

## 2024-11-24 RX ADMIN — KETOROLAC TROMETHAMINE 15 MG: 30 INJECTION, SOLUTION INTRAMUSCULAR at 09:54

## 2024-11-24 RX ADMIN — PHENYLEPHRINE HYDROCHLORIDE 0.2 MCG/KG/MIN: 10 INJECTION INTRAVENOUS at 09:04

## 2024-11-24 RX ADMIN — CITALOPRAM HYDROBROMIDE 20 MG: 20 TABLET ORAL at 21:58

## 2024-11-24 RX ADMIN — SODIUM CITRATE AND CITRIC ACID MONOHYDRATE 30 ML: 500; 334 SOLUTION ORAL at 08:31

## 2024-11-24 RX ADMIN — DEXAMETHASONE SODIUM PHOSPHATE 10 MG: 10 INJECTION, SOLUTION INTRAMUSCULAR; INTRAVENOUS at 09:21

## 2024-11-24 RX ADMIN — METHYLERGONOVINE MALEATE 200 MCG: 0.2 INJECTION INTRAVENOUS at 09:20

## 2024-11-24 RX ADMIN — SODIUM CHLORIDE, POTASSIUM CHLORIDE, SODIUM LACTATE AND CALCIUM CHLORIDE: 600; 310; 30; 20 INJECTION, SOLUTION INTRAVENOUS at 00:58

## 2024-11-24 RX ADMIN — PRENATAL VIT W/ FE FUMARATE-FA TAB 27-0.8 MG 1 TABLET: 27-0.8 TAB at 21:58

## 2024-11-24 RX ADMIN — Medication 0.15 MG: at 08:45

## 2024-11-24 RX ADMIN — Medication 300 ML/HR: at 09:15

## 2024-11-24 RX ADMIN — KETOROLAC TROMETHAMINE 30 MG: 30 INJECTION, SOLUTION INTRAMUSCULAR at 15:56

## 2024-11-24 RX ADMIN — BUPIVACAINE HYDROCHLORIDE 6 ML: 2.5 INJECTION, SOLUTION EPIDURAL; INFILTRATION; INTRACAUDAL at 05:47

## 2024-11-24 RX ADMIN — KETOROLAC TROMETHAMINE 30 MG: 30 INJECTION, SOLUTION INTRAMUSCULAR at 21:59

## 2024-11-24 RX ADMIN — AZITHROMYCIN MONOHYDRATE 500 MG: 500 INJECTION, POWDER, LYOPHILIZED, FOR SOLUTION INTRAVENOUS at 08:59

## 2024-11-24 RX ADMIN — ONDANSETRON 4 MG: 2 INJECTION INTRAMUSCULAR; INTRAVENOUS at 08:40

## 2024-11-24 NOTE — PLAN OF CARE
Goal Outcome Evaluation:      Plan of Care Reviewed With: patient    Overall Patient Progress: improvingOverall Patient Progress: improving    Patient and  bonding well with . Infant skin to skin and breastfeeding intermittently. Fundal checks WDL after initial check where few large clots were expressed. VSS. Patient reports pain 2/10 throughout PACU period, declines additional medication. Patient asking appropriate questions about recovery and transition to home. No further concerns at this time. Inadequate urine output and concentrated urine at end of recovery period. 1L bolus LR to be given per Dr. Hidalgo.     Problem: Adult Inpatient Plan of Care  Goal: Plan of Care Review  Description: The Plan of Care Review/Shift note should be completed every shift.  The Outcome Evaluation is a brief statement about your assessment that the patient is improving, declining, or no change.  This information will be displayed automatically on your shift  note.  Outcome: Progressing     Problem: Postpartum ( Delivery)  Goal: Successful Parent Role Transition  Outcome: Progressing     Problem: Postpartum ( Delivery)  Goal: Hemostasis  Outcome: Progressing     Problem: Postpartum ( Delivery)  Goal: Absence of Infection Signs and Symptoms  Outcome: Progressing

## 2024-11-24 NOTE — CONSULTS
Asked to consult on this patient due to 5 hours of pushing an no  progress at 0 to +1 station. Being induced at 41 weeks for obesity and postdates. No other significant medical or surgical history.   Offered attempt at vacuum or c section. She wants the latter. Risks and benefits reviewed. All questions answered. She verbalized understanding.

## 2024-11-24 NOTE — CARE PLAN
Provider notification:   Provider: Dr. Bailey MCDOWELL was notified at 0217 regarding:  Category II fetal heart rate tracing since AROM    Category II Algorithm     Fetal heart rate and uterine activity reviewed with provider.    EFM interpretation suggests: absence of concern for metabolic acidemia due to: moderate variability. EFM suggests no concern for interruption of the oxygen pathway...     Interventions to improve fetal oxygenation for a Category II tracing include: blood pressure check, Category II algorithm reviewed, continue monitoring, emotional support, evaluate labor progress, increase frequency of fetal and uterine assessment, IV fluid bolus , maternal positioning, and sterile vaginal exam    After discussion with provider: Continue with current plan.

## 2024-11-24 NOTE — PLAN OF CARE
Goal Outcome Evaluation:      Plan of Care Reviewed With: patient, spouse    Overall Patient Progress: improvingOverall Patient Progress: improving    Outcome Evaluation: 0255: Pt feeling pressure. SVE 10/100/1. Dr. Avalos notified to attend delivery.

## 2024-11-24 NOTE — PROGRESS NOTES
D:  Patient admitted to [facility]7 via bed with  and support person: Star BOBBY:  Bedside report received from Loretta TANG RN . Oriented patient and family to surroundings; call light within reach. 4 Part ID bands double checked with reporting RN.  R:  Patient and  tolerated transfer. Care assumed.

## 2024-11-24 NOTE — ANESTHESIA PREPROCEDURE EVALUATION
Anesthesia Pre-Procedure Evaluation    Patient: Christa Erickson   MRN: [id number] : 1995        Procedure :           Past Medical History:   Diagnosis Date    Abdominal migraine 2015    OPAL (generalized anxiety disorder)     Migraine headache without aura     Uncomplicated asthma       Past Surgical History:   Procedure Laterality Date    DENTAL SURGERY      wisdom    LASIK        Allergies   Allergen Reactions    Tree Nuts [Nuts] Itching and Swelling      Social History     Tobacco Use    Smoking status: Never     Passive exposure: Never    Smokeless tobacco: Never   Substance Use Topics    Alcohol use: Yes     Comment: occ       Wt Readings from Last 1 Encounters:   24 97.1 kg (214 lb)        Anesthesia Evaluation            ROS/MED HX  ENT/Pulmonary:     (+)                      asthma                  Neurologic:       Cardiovascular:       METS/Exercise Tolerance:     Hematologic:       Musculoskeletal:       GI/Hepatic:       Renal/Genitourinary:       Endo:       Psychiatric/Substance Use:       Infectious Disease:       Malignancy:       Other:            Physical Exam    Airway  airway exam normal           Respiratory Devices and Support         Dental       (+) Completely normal teeth      Cardiovascular          Rhythm and rate: regular and normal     Pulmonary           breath sounds clear to auscultation           OUTSIDE LABS:  CBC:   Lab Results   Component Value Date    WBC 7.2 2024    WBC 8.7 2024    HGB 13.5 2024    HGB 12.6 2024    HCT 37.7 2024    HCT 38.6 2024     2024     2024     BMP:   Lab Results   Component Value Date     2022     2021    POTASSIUM 4.0 2022    POTASSIUM 4.6 2021    CHLORIDE 107 2022    CHLORIDE 107 2021    CO2 23 2022    CO2 25 2021    BUN 13 2022    BUN 11 2021    CR 0.73 2022    CR 0.69 2021     (H)  "02/18/2022    GLC 91 12/31/2021     COAGS: No results found for: \"PTT\", \"INR\", \"FIBR\"  POC:   Lab Results   Component Value Date    HCG Positive (A) 03/12/2024    HCGS Negative 02/18/2022     HEPATIC:   Lab Results   Component Value Date    ALBUMIN 4.0 02/18/2022    PROTTOTAL 7.5 02/18/2022    ALT 14 02/18/2022    AST 15 02/18/2022    ALKPHOS 41 (L) 02/18/2022    BILITOTAL 0.6 02/18/2022     OTHER:   Lab Results   Component Value Date    A1C 5.1 12/31/2021    SURJIT 8.8 02/18/2022    MAG 1.8 08/16/2024    TSH 1.24 12/31/2021       Anesthesia Plan    ASA Status:  2       Anesthesia Type: Epidural.              Consents    Anesthesia Plan(s) and associated risks, benefits, and realistic alternatives discussed. Questions answered and patient/representative(s) expressed understanding.     - Discussed: Risks, Benefits and Alternatives for the PROCEDURE were discussed     - Discussed with:  Patient      - Extended Intubation/Ventilatory Support Discussed: No.      - Patient is DNR/DNI Status: No     Use of blood products discussed: No .     Postoperative Care    Pain management: Neuraxial analgesia.        Comments:               Valentino Montero MD    I have reviewed the pertinent notes and labs in the chart from the past 30 days and (re)examined the patient.  Any updates or changes from those notes are reflected in this note.                              # Asthma: noted on problem list       "

## 2024-11-24 NOTE — PROVIDER NOTIFICATION
11/24/24 1210   Provider Notification   Provider Name/Title Dr. Hidalgo   Method of Notification In Department   Request Evaluate - Remote   Notification Reason Other (Comment)     Dr. Hidalgo notified of 50cc urine output during recovery phase, urine is also dark and concentrated. Vervbal orders for 1000 L LR bolus

## 2024-11-24 NOTE — PLAN OF CARE
Problem: Adult Inpatient Plan of Care  Goal: Plan of Care Review  Description: The Plan of Care Review/Shift note should be completed every shift.  The Outcome Evaluation is a brief statement about your assessment that the patient is improving, declining, or no change.  This information will be displayed automatically on your shift  note.  Outcome: Progressing  Flowsheets (Taken 11/23/2024 2301)  Outcome Evaluation: Pt requested for labor epidural. Dr. Valentino Montero udpated on request. Patient situated for epidural placement.  Plan of Care Reviewed With:   patient   spouse  Overall Patient Progress: improving  Goal: Optimal Comfort and Wellbeing  Intervention: Monitor Pain and Promote Comfort  Recent Flowsheet Documentation  Taken 11/23/2024 2051 by Maryuri Madrid, RN  Pain Management Interventions: declines  Intervention: Provide Person-Centered Care  Recent Flowsheet Documentation  Taken 11/23/2024 1916 by Maryuri Madrid, RN  Trust Relationship/Rapport:   care explained   choices provided   emotional support provided   empathic listening provided   questions answered   questions encouraged   reassurance provided   thoughts/feelings acknowledged   Goal Outcome Evaluation:      Plan of Care Reviewed With: patient, spouse    Overall Patient Progress: improvingOverall Patient Progress: improving    Outcome Evaluation: Pt requested for labor epidural. Dr. Valentino Montero udpated on request. Patient situated for epidural placement.    9952  Dr. Montero notified to sign consent for labor epidural placement.

## 2024-11-24 NOTE — PROGRESS NOTES
Labor Progress Note    Subjective:  Christa Erickson is a 28 year old yo  who was admitted for induction for obesity and postdates. Pt did cervidil on Thursday, it was removed a little early given occasional decelerations and uterine tachysystole. Pt completed 11 doses of cytotec - she vomited 5 mins after last dose. In house OB placed mechanical ripening catheter this morning around 9:30am - we started low dose pitocin this afternoon - catheter removed about 45 mins ago and pt was 4/70/-2, on 4 mU pitocin, comfortable.    Objective:  Temp:  [98.1  F (36.7  C)-98.7  F (37.1  C)] 98.2  F (36.8  C)  Resp:  [16-18] 16  BP: (110-134)/(65-86) 129/86  SpO2:  [95 %-97 %] 96 %    Cervix: 4/70/-2 per last nurse check  Membranes: AROM  FHT: 130 bpm, moderate variability, +accels, one variable   Contractions: every 1.5-4 mins    Impression/Plan:  Christa Erickson is a 28 year old year old at 41w0d weeks here for induction for obesity and postdates. Pt made good progress with cook catheter and low dose pitocin - AROM moments ago with light mec fluid  -anticipate   -PCN for GBS positive   -continue pitocin as needed to maintain adequate contraction pattern  -given mec fluid will plan NICU team at delivery    Emily Avalos MD

## 2024-11-24 NOTE — CARE PLAN
Cook catheter pulled out at 2125. Patient tolerated well.   SVE 4/70/-2.  Pitocin currently running at 4mu/hr.  Contractions 1-4 minutes, moderate-strong contractions.    Dr. Avalos updated on above. Dr. Avalos is on her way in to AROM pt.      Patient updated on plan of care. Patient and significant other in agreement to plan of care.

## 2024-11-24 NOTE — CARE PLAN
Patient resting comfortably after epidural placement.  Per patient, she have one small spot on her right hip/back that she continue to feel. Patient shown how to use bolus button and encouraged patient to use it. Pt to call nurse if pt continue to feel discomfort. Patient and significant other verbalized understanding to call nurse.      Dr. Montero updated on above. No new orders at this time.

## 2024-11-24 NOTE — ANESTHESIA PROCEDURE NOTES
"Intrathecal injection Procedure Note    Pre-Procedure   Staff -        Anesthesiologist:  Christiana Jewell MD       Performed By: CRNA and anesthesiologist       Location: OR       Procedure Start/Stop Times: 11/24/2024 8:45 AM and 11/24/2024 8:54 AM       Pre-Anesthestic Checklist: patient identified, IV checked, risks and benefits discussed, informed consent, monitors and equipment checked, pre-op evaluation, at physician/surgeon's request and post-op pain management  Timeout:       Correct Patient: Yes        Correct Procedure: Yes        Correct Site: Yes        Correct Position: Yes   Procedure Documentation  Procedure: intrathecal injection       Patient Position: sitting       Skin prep: Chloraprep       Insertion Site: L3-4. (midline approach).       Needle Gauge: 25.        Needle Length (Inches): 3.5        Spinal Needle Type: Pencan       Introducer used       Introducer: 20 G       # of attempts: 1 and  # of redirects:  0    Assessment/Narrative         Paresthesias: No.       Sensory Level: T6       CSF fluid: clear (initially  blood tinged but cleared).       Opening pressure was cmH2O while  Sitting.      Medication(s) Administered   0.75% Hyperbaric Bupivacaine (Intrathecal) - Intrathecal   1.6 mL - 11/24/2024 8:45:00 AM  Fentanyl PF (Intrathecal) - Intrathecal   15 mcg - 11/24/2024 8:45:00 AM  Morphine PF 1 mg/mL (Intrathecal) - Intrathecal   0.15 mg - 11/24/2024 8:45:00 AM  Medication Administration Time: 11/24/2024 8:45 AM      FOR [facility] ([facility]) ONLY:   Pain Team Contact information: please page the Pain Team Via [employer]. Search \"Pain\". During daytime hours, please page the attending first. At night please page the resident first.      "

## 2024-11-24 NOTE — ANESTHESIA PROCEDURE NOTES
"Epidural catheter Procedure Note    Pre-Procedure   Staff -        Anesthesiologist:  Valentino Montero MD       Performed By: anesthesiologist       Location: OB       Pre-Anesthestic Checklist: patient identified, IV checked, risks and benefits discussed, informed consent, monitors and equipment checked, pre-op evaluation, at physician/surgeon's request and post-op pain management  Timeout:       Correct Patient: Yes        Correct Procedure: Yes        Correct Site: Yes        Correct Position: Yes   Procedure Documentation  Procedure: epidural catheter       Diagnosis: Labor pain       Patient Position: sitting       Patient Prep/Sterile Barriers: sterile gloves, mask, patient draped       Skin prep: Chloraprep       Local skin infiltrated with mL of 1% lidocaine.        Insertion Site: L3-4. (midline approach).       Technique: LORT saline        DEJA at 9 cm.       Needle Type: Modo Labsy needle       Needle Gauge: 17.        Needle Length (Inches): 3.5        Catheter: 19 G.          Catheter threaded easily.         6 cm epidural space.         Threaded 15 cm at skin.         # of attempts: 1 and  # of redirects:  0    Assessment/Narrative         Paresthesias: No.       Test dose of 3 mL lidocaine 1.5% w/ 1:200,000 epinephrine at 23:16 CST.        .       Insertion/Infusion Method: LORT saline       Aspiration negative for Heme or CSF via Epidural Catheter.    Medication(s) Administered   0.25% Bupivacaine PF (Epidural) - EPIDURAL   8 mL - 11/23/2024 11:20:00 PM  0.1% ropivacaine + 2 mcg/mL fentaNYL in NS - EPIDURAL   6 mL - 11/23/2024 11:24:00 PM   Comments:  Time in room 2252  Timeout 2256  Catheter placed 2315  Test dose 2316  Loading dose 2320  End time 2324      FOR [facility] ([facility]) ONLY:   Pain Team Contact information: please page the Pain Team Via [employer]. Search \"Pain\". During daytime hours, please page the attending first. At night please page the resident first.      "

## 2024-11-24 NOTE — OP NOTE
NAME:  Christa Erickson     DATE OF SERVICE: 2024     PREOPERATIVE DIAGNOSIS: Arrest of second stage of labor    POSTOPERATIVE DIAGNOSIS: Same    PROCEDURE: Primary low transverse  section      SURGEON:  Elier Hidalgo MD     ASSISTANT: Bailey    ANESTHESIA: Spinal    QBL Delivery QBL (mL): 1124    DRAINS: Bustillo catheter.    COMPLICATIONS: None    FINDINGS: Normal uterus, tubes and ovaries bilateral. Normal appearance to the adnexae.  Live female infant born with Apgars of 7 at one minute, 9 at 5 minutes,  weight unavailable at time of dictation.    PROCEDURE:  Patient was met preoperatively where we discussed the procedure and the risks associated with the procedure.  She understood these to include but not limited to injury to adjacent organs including bowel, bladder, ureter, infection and bleeding. Understanding these risks her consents were signed.      She was brought to the operating room in stable condition.  After induction of a spinal anesthetic, fetal heart tones were checked and were stable. She was carefully prepped and draped in sterile fashion for the procedure.  A timeout was then performed.      A Pfannenstiel skin incision was made and carried down to the rectus fascia which was incised in the midline and carried out bilaterally.  The superior and inferior aspects of the rectus fascia were elevated up and the underlying rectus muscles dissected off with sharp and blunt techniques.  The rectus muscles were now  at the midline and the peritoneum was identified and entered sharply.  This incision was then extended.  A bladder blade was introduced. The vesicouterine peritoneum was identified and a bladder flap was created.  A low transverse uterine incision was made revealing clear amniotic fluid.  The baby's head was wedged into the pelvis and I had to pull it out towards the incision.  The baby's head was then delivered.The body and shoulders were delivered without  complication. The cord was clamped x 2 and cut and the infant handed off to waiting nursing personnel.    The placenta was then manually removed from the uterus.  The uterus was exteriorized, covered with a moist laparotomy sponge and cleared of all clots and debris.  The uterine incision was now closed with 0 Vicryl from both angles in a running locking suture.  There were several areas of uterine vascular bleeding on each side of the incision that had to be controlled with several stitches of 0 Vicryl.  I imbricated the incision with a layer of 0 Monocryl. The bladder flap was left to heal by secondary intention. The uterus was returned to the abdominopelvic cavity.  The pericolic gutters were cleared of all clots and debris.  The uterus was again inspected and noted to be hemostatic.  I placed Surgiflo over the entire incision.  The fascia was brought together with looped O PDS. The subcutaneous tissues were irrigated, made hemostatic with use of electrocautery and 3-0 plain gut.  Skin was closed with 4-0 Monocryl.  Patient tolerated this procedure well.  Sponge, lap and needle counts were correct x two.      Elier Hidalgo MD     CC: Bailey

## 2024-11-24 NOTE — PROGRESS NOTES
Labor Progress Note    Subjective:  Pt doing well overall - pushing for 2.5 hrs now - has made progress, on last check baby felt OP though head slightly to maternal right, caput present. Was feeling more contraction pain so anesthesia came to give a bolus.    Objective:  Temp:  [98.1  F (36.7  C)-99.5  F (37.5  C)] 99.5  F (37.5  C)  Resp:  [16-18] 16  BP: ()/(51-89) 116/61  SpO2:  [93 %-97 %] 93 %    Cervix: complete  Membranes: AROM  FHT: 150 bpm, moderate variability, +accels, one variable and one decel in last 10 mins  Contractions: every 2-4 mins    Impression/Plan:  Christa Erickson is a 28 year old year old at 41w1d weeks here for induction for obesity and postdates. Overall has made good progress with pushing though baby feels OP and tight fit in this position, caput is present - will continue to try different pushing positions to promote fetal rotation.  -anticipate  though will monitor for arrest of descent  -PCN for GBS positive - has gotten 2 doses so far  -continue pitocin as needed to maintain adequate contraction pattern  -given mec fluid will plan NICU team at delivery    Emily Avalos MD

## 2024-11-24 NOTE — PROGRESS NOTES
Attended delivery. Assisted with post delivery routine care. No respiratory intervention needed.    Leslye Flores, RT

## 2024-11-24 NOTE — ANESTHESIA CARE TRANSFER NOTE
Patient: Christa Erickson    Procedure: Procedure(s):   SECTION       Diagnosis: Failure to progress in labor [O62.2]  Diagnosis Additional Information: No value filed.    Anesthesia Type:   Spinal    Note:    Oropharynx: oropharynx clear of all foreign objects  Level of Consciousness: awake  Oxygen Supplementation: room air    Independent Airway: airway patency satisfactory and stable  Dentition: dentition unchanged  Vital Signs Stable: post-procedure vital signs reviewed and stable  Report to RN Given: handoff report given  Patient transferred to: Labor and Delivery          Vitals:  Vitals Value Taken Time   /75 24 1009   Temp     Pulse     Resp     SpO2 97 % 24 1013   Vitals shown include unfiled device data.    Electronically Signed By: NOELLE Gomes CRNA  2024  10:14 AM

## 2024-11-24 NOTE — PROGRESS NOTES
Labor Progress Note    Subjective:  Pt very tired/exhausted - no significant fetal descent despite adequate maternal effort.    Objective:  Temp:  [98.2  F (36.8  C)-99.5  F (37.5  C)] 98.6  F (37  C)  Resp:  [16-18] 16  BP: ()/(51-89) 126/75  SpO2:  [93 %-97 %] 93 %    Cervix: complete  Membranes: AROM  FHT: 150 bpm, moderate variability, +accels, no decels  Contractions: every 2-4 mins    Impression/Plan:  Christa Erickson is a 28 year old year old at 41w1d weeks here for induction for obesity and postdates. Consulted in house OB for primary  given arrest of descent - I will assist with  for retraction, visualization and delivery of fetus.    Emily Avalos MD

## 2024-11-25 LAB
ALBUMIN SERPL BCG-MCNC: 3 G/DL (ref 3.5–5.2)
ALP SERPL-CCNC: 133 U/L (ref 40–150)
ALT SERPL W P-5'-P-CCNC: 8 U/L (ref 0–50)
ANION GAP SERPL CALCULATED.3IONS-SCNC: 8 MMOL/L (ref 7–15)
AST SERPL W P-5'-P-CCNC: 17 U/L (ref 0–45)
BILIRUB SERPL-MCNC: 0.2 MG/DL
BUN SERPL-MCNC: 6.9 MG/DL (ref 6–20)
CALCIUM SERPL-MCNC: 8.6 MG/DL (ref 8.8–10.4)
CHLORIDE SERPL-SCNC: 107 MMOL/L (ref 98–107)
CREAT SERPL-MCNC: 0.65 MG/DL (ref 0.51–0.95)
EGFRCR SERPLBLD CKD-EPI 2021: >90 ML/MIN/1.73M2
ERYTHROCYTE [DISTWIDTH] IN BLOOD BY AUTOMATED COUNT: 13.4 % (ref 10–15)
GLUCOSE SERPL-MCNC: 108 MG/DL (ref 70–99)
HCO3 SERPL-SCNC: 23 MMOL/L (ref 22–29)
HCT VFR BLD AUTO: 33 % (ref 35–47)
HGB BLD-MCNC: 10.9 G/DL (ref 11.7–15.7)
HGB BLD-MCNC: 11.9 G/DL (ref 11.7–15.7)
MCH RBC QN AUTO: 29.2 PG (ref 26.5–33)
MCHC RBC AUTO-ENTMCNC: 33 G/DL (ref 31.5–36.5)
MCV RBC AUTO: 89 FL (ref 78–100)
PLATELET # BLD AUTO: 201 10E3/UL (ref 150–450)
POTASSIUM SERPL-SCNC: 4.2 MMOL/L (ref 3.4–5.3)
PROT SERPL-MCNC: 5.7 G/DL (ref 6.4–8.3)
RBC # BLD AUTO: 3.73 10E6/UL (ref 3.8–5.2)
SODIUM SERPL-SCNC: 138 MMOL/L (ref 135–145)
T PALLIDUM AB SER QL: NONREACTIVE
WBC # BLD AUTO: 12 10E3/UL (ref 4–11)

## 2024-11-25 PROCEDURE — 250N000013 HC RX MED GY IP 250 OP 250 PS 637: Performed by: FAMILY MEDICINE

## 2024-11-25 PROCEDURE — 85018 HEMOGLOBIN: CPT | Performed by: OBSTETRICS & GYNECOLOGY

## 2024-11-25 PROCEDURE — 36415 COLL VENOUS BLD VENIPUNCTURE: CPT | Performed by: OBSTETRICS & GYNECOLOGY

## 2024-11-25 PROCEDURE — 250N000011 HC RX IP 250 OP 636: Performed by: OBSTETRICS & GYNECOLOGY

## 2024-11-25 PROCEDURE — 80053 COMPREHEN METABOLIC PANEL: CPT | Performed by: OBSTETRICS & GYNECOLOGY

## 2024-11-25 PROCEDURE — 120N000013 HC R&B IMCU

## 2024-11-25 PROCEDURE — 250N000013 HC RX MED GY IP 250 OP 250 PS 637: Performed by: OBSTETRICS & GYNECOLOGY

## 2024-11-25 RX ORDER — LABETALOL HYDROCHLORIDE 5 MG/ML
20 INJECTION, SOLUTION INTRAVENOUS
Status: DISCONTINUED | OUTPATIENT
Start: 2024-11-25 | End: 2024-11-27 | Stop reason: HOSPADM

## 2024-11-25 RX ORDER — NIFEDIPINE 30 MG
30 TABLET, EXTENDED RELEASE ORAL DAILY
Status: DISCONTINUED | OUTPATIENT
Start: 2024-11-25 | End: 2024-11-27

## 2024-11-25 RX ORDER — NIFEDIPINE 10 MG/1
10-20 CAPSULE ORAL
Status: DISCONTINUED | OUTPATIENT
Start: 2024-11-25 | End: 2024-11-27 | Stop reason: HOSPADM

## 2024-11-25 RX ORDER — LIDOCAINE 40 MG/G
CREAM TOPICAL
Status: DISCONTINUED | OUTPATIENT
Start: 2024-11-25 | End: 2024-11-27 | Stop reason: HOSPADM

## 2024-11-25 RX ADMIN — KETOROLAC TROMETHAMINE 30 MG: 30 INJECTION, SOLUTION INTRAMUSCULAR at 04:29

## 2024-11-25 RX ADMIN — IBUPROFEN 800 MG: 800 TABLET ORAL at 22:20

## 2024-11-25 RX ADMIN — Medication: at 21:20

## 2024-11-25 RX ADMIN — PRENATAL VIT W/ FE FUMARATE-FA TAB 27-0.8 MG 1 TABLET: 27-0.8 TAB at 21:20

## 2024-11-25 RX ADMIN — IBUPROFEN 800 MG: 800 TABLET ORAL at 10:18

## 2024-11-25 RX ADMIN — ACETAMINOPHEN 975 MG: 325 TABLET ORAL at 04:29

## 2024-11-25 RX ADMIN — ACETAMINOPHEN 975 MG: 325 TABLET ORAL at 10:18

## 2024-11-25 RX ADMIN — CITALOPRAM HYDROBROMIDE 20 MG: 20 TABLET ORAL at 21:21

## 2024-11-25 RX ADMIN — SENNOSIDES AND DOCUSATE SODIUM 1 TABLET: 8.6; 5 TABLET ORAL at 09:17

## 2024-11-25 RX ADMIN — ACETAMINOPHEN 975 MG: 325 TABLET ORAL at 15:59

## 2024-11-25 RX ADMIN — ACETAMINOPHEN 975 MG: 325 TABLET ORAL at 22:19

## 2024-11-25 RX ADMIN — SENNOSIDES AND DOCUSATE SODIUM 1 TABLET: 8.6; 5 TABLET ORAL at 21:20

## 2024-11-25 RX ADMIN — IBUPROFEN 800 MG: 800 TABLET ORAL at 16:00

## 2024-11-25 ASSESSMENT — ACTIVITIES OF DAILY LIVING (ADL)
ADLS_ACUITY_SCORE: 24
ADLS_ACUITY_SCORE: 27
ADLS_ACUITY_SCORE: 0
ADLS_ACUITY_SCORE: 24
ADLS_ACUITY_SCORE: 27
ADLS_ACUITY_SCORE: 0
ADLS_ACUITY_SCORE: 27
ADLS_ACUITY_SCORE: 0
ADLS_ACUITY_SCORE: 0
ADLS_ACUITY_SCORE: 27
ADLS_ACUITY_SCORE: 24
ADLS_ACUITY_SCORE: 27
ADLS_ACUITY_SCORE: 0
ADLS_ACUITY_SCORE: 27
ADLS_ACUITY_SCORE: 0

## 2024-11-25 NOTE — PLAN OF CARE
Problem: Adult Inpatient Plan of Care  Goal: Absence of Hospital-Acquired Illness or Injury  Intervention: Prevent and Manage VTE (Venous Thromboembolism) Risk  Recent Flowsheet Documentation  Taken 11/24/2024 1715 by Dedra Napier RN  VTE Prevention/Management: SCDs off (sequential compression devices)  Taken 11/24/2024 1300 by Dedra Napier RN  VTE Prevention/Management: SCDs on (sequential compression devices)     Problem: Adult Inpatient Plan of Care  Goal: Optimal Comfort and Wellbeing  Outcome: Progressing  Intervention: Monitor Pain and Promote Comfort  Recent Flowsheet Documentation  Taken 11/24/2024 1830 by Dedra Napier RN  Pain Management Interventions: ambulation/increased activity  Taken 11/24/2024 1715 by Dedra Napier RN  Pain Management Interventions:   ambulation/increased activity   cold applied  Taken 11/24/2024 1655 by Dedra Napier RN  Pain Management Interventions: cold applied  Taken 11/24/2024 1556 by Dedra Napier RN  Pain Management Interventions:   medication (see MAR)   cold applied  Taken 11/24/2024 1415 by Dedra Napier RN  Pain Management Interventions: declines  Intervention: Provide Person-Centered Care  Recent Flowsheet Documentation  Taken 11/24/2024 1715 by Dedra Napier RN  Trust Relationship/Rapport:   care explained   choices provided   questions answered   questions encouraged   reassurance provided  Taken 11/24/2024 1300 by Dedra Napier RN  Trust Relationship/Rapport:   care explained   choices provided   questions answered   questions encouraged   reassurance provided     Problem: Adult Inpatient Plan of Care  Goal: Optimal Comfort and Wellbeing  Intervention: Provide Person-Centered Care  Recent Flowsheet Documentation  Taken 11/24/2024 1715 by Dedra Napier RN  Trust Relationship/Rapport:   care explained   choices provided   questions answered   questions encouraged   reassurance  provided  Taken 2024 1300 by Dedra Napier RN  Trust Relationship/Rapport:   care explained   choices provided   questions answered   questions encouraged   reassurance provided     Problem: Postpartum ( Delivery)  Goal: Successful Parent Role Transition  Intervention: Support Parent Role Transition  Recent Flowsheet Documentation  Taken 2024 1300 by Dedra Napier RN  Supportive Measures:   active listening utilized   positive reinforcement provided   verbalization of feelings encouraged  Parent-Child Attachment Promotion:   participation in care promoted   positive reinforcement provided   rooming-in promoted  Goal: Effective Bowel Elimination  Intervention: Enhance Bowel Motility and Elimination  Recent Flowsheet Documentation  Taken 2024 1300 by Dedra Napier RN  Bowel Motility Enhancement: fluid intake encouraged  Bowel Elimination Promotion: adequate fluid intake promoted  Goal: Optimal Pain Control and Function  Outcome: Progressing  Intervention: Prevent or Manage Pain  Recent Flowsheet Documentation  Taken 2024 1830 by Dedra Napier RN  Pain Management Interventions: ambulation/increased activity  Taken 2024 1715 by Dedra Napier RN  Pain Management Interventions:   ambulation/increased activity   cold applied  Perineal Care: absorbent brief/pad changed  Taken 2024 1655 by Dedra Napier RN  Pain Management Interventions: cold applied  Taken 2024 1556 by Dedra Napier RN  Pain Management Interventions:   medication (see MAR)   cold applied         Goal Outcome Evaluation:      Plan of Care Reviewed With: patient, spouse    Overall Patient Progress: improving    Outcome Evaluation: VSS. Post partum and post-op assessments WNL. Patient is tolerating oral hydration and has eaten, enc to increase fluids and IV to saline lock. Abdominal binder applied and patient assisted OOB twice. Bustillo catheter  removed at 1830. RN assisting with latch and enc patient observe for cues and call. Patient and spouse have been able to take nap this afternoon nad both have beeing doing skin to skin with baby.

## 2024-11-25 NOTE — ANESTHESIA POSTPROCEDURE EVALUATION
Patient: Christa Erickson    Procedure: Procedure(s):   SECTION       Anesthesia Type:  Spinal    Note:  Disposition: Inpatient   Postop Pain Control: Uneventful            Sign Out: Well controlled pain   PONV: No   Neuro/Psych: Uneventful            Sign Out: Acceptable/Baseline neuro status   Airway/Respiratory: Uneventful            Sign Out: Acceptable/Baseline resp. status   CV/Hemodynamics: Uneventful            Sign Out: Acceptable CV status; No obvious hypovolemia; No obvious fluid overload   Other NRE: NONE   DID A NON-ROUTINE EVENT OCCUR? No           Last vitals:  Vitals Value Taken Time   /70 24 1141   Temp 37.1  C (98.7  F) 24 1010   Pulse     Resp 16 24 1140   SpO2 93 % 24 1153   Vitals shown include unfiled device data.    Electronically Signed By: Christiana Jewell MD  2024  7:35 PM

## 2024-11-25 NOTE — PLAN OF CARE
Patient ambulating in room well. Has abdominal binder on for comfort.  Patient feeding, holding, speaking, and doing skin-to-skin time with infant.  Patient is breast feeding infant and started supplementing donors milk overnight per patient request. Started pumping and educated on how to use pump.  Educated the importance of feeding baby every 2-3 hours.   Has not completed birth certificate or mood assessment, parents aware of completing.   Parents are participating in infant cares and asking appropriate questions.    Problem: Postpartum ( Delivery)  Goal: Effective Oxygenation and Ventilation  Intervention: Optimize Oxygenation and Ventilation  Recent Flowsheet Documentation  Taken 2024 0425 by Leslye Merritt, RN  Head of Bed (HOB) Positioning: HOB at 30-45 degrees     Problem: Breastfeeding  Goal: Effective Breastfeeding  2024 0732 by Leslye Merritt, RN  Outcome: Progressing    Goal Outcome Evaluation:      Plan of Care Reviewed With: patient, spouse    Overall Patient Progress: improvingOverall Patient Progress: improving    Outcome Evaluation: Patient's VSS and maternal assessments WDL. Passed two voids over 200 mL.

## 2024-11-25 NOTE — PLAN OF CARE
"  Problem: Adult Inpatient Plan of Care  Goal: Plan of Care Review  Description: The Plan of Care Review/Shift note should be completed every shift.  The Outcome Evaluation is a brief statement about your assessment that the patient is improving, declining, or no change.  This information will be displayed automatically on your shift  note.  Outcome: Progressing  Flowsheets (Taken 11/25/2024 1448)  Plan of Care Reviewed With:   patient   spouse  Overall Patient Progress: improving  Goal: Patient-Specific Goal (Individualized)  Description: You can add care plan individualizations to a care plan. Examples of Individualization might be:  \"Parent requests to be called daily at 9am for status\", \"I have a hard time hearing out of my right ear\", or \"Do not touch me to wake me up as it startles  me\".  Outcome: Progressing  Goal: Absence of Hospital-Acquired Illness or Injury  Outcome: Progressing  Intervention: Prevent Skin Injury  Recent Flowsheet Documentation  Taken 11/25/2024 0755 by Madison Stark RN  Body Position: position changed independently  Intervention: Prevent and Manage VTE (Venous Thromboembolism) Risk  Recent Flowsheet Documentation  Taken 11/25/2024 0755 by Madison Stark RN  VTE Prevention/Management: SCDs off (sequential compression devices)  Intervention: Prevent Infection  Recent Flowsheet Documentation  Taken 11/25/2024 0755 by Madison Stark RN  Infection Prevention: rest/sleep promoted  Goal: Optimal Comfort and Wellbeing  Outcome: Progressing  Intervention: Provide Person-Centered Care  Recent Flowsheet Documentation  Taken 11/25/2024 0755 by Madison Stark RN  Trust Relationship/Rapport:   care explained   choices provided   emotional support provided   empathic listening provided   questions answered   questions encouraged   reassurance provided   thoughts/feelings acknowledged  Goal: Readiness for Transition of Care  Outcome: Progressing   Goal Outcome Evaluation:      Plan of Care " Reviewed With: patient, spouse    Overall Patient Progress: improvingOverall Patient Progress: improving     Christa is feeling well and took a shower today.  Her vitals have been stable.  Fundus is firm and lochia is scant.  Her incision is covered and clean, dry, and intact.  She is voiding and starting to pass gas.  She has been up independently in her room and she is pumping every time the baby is supplemented with donor milk.  Her pain is very low and she is only taking scheduled Tylenol and Ibuprofen.

## 2024-11-25 NOTE — LACTATION NOTE
This note was copied from a baby's chart.  Lactation Visit:      Hours since Delivery: 1 day 1 hour old.    Gestational Age at Delivery: 41.1 weeks.    Maternal Risk Factors to consider: Primip, pre-gravid elevated BMI, had a PPH this delivery,  birth, and anxiety.    Visit: Since birth, infant has been to breast 7 times, has received 1-5mL of PDHM via bottle per feeding, has voided x1, stooled x6, and has had a weight loss of 4.3% (since birth). Infant has completed initial BS checks for LGA, and will have a spot check with  screening. Mother states infant has been spitty, and she is wondering how to hold infant while breastfeeding. Mother in chair attempting to breastfeed upon entering room, we attempted to latch infant in football hold and cross-cradle-dyad struggling to align in chair. Dyad moved to bed, infant set up in football hold on right breast, and infant latched deep onto breast, and was able to sustain a deep latch. Mother struggling to handle breast, and infant at the same time, did lots of education on positioning, supporting nipple/breast, and how to obtain and sustain a deep latch-mothers confidence seemed to improve throughout visit. Discussed  feeding behaviors during first few days of life (including normal sleepiness and second night behaviors), benefits of skin-to-skin, waking techniques, hunger cues, nutritive vs non-nutritive sucking, hand expression (demonstrated), paced bottle feeding, the importance of tracking infants feedings and diaper output, as well as supplementation volumes and pumping if infant isn't going to breast well for feedings. Education given on importance of infant positioning for a deep, comfortable latch for effective milk transfer. Instructed on techniques for keeping infant actively sucking, including breast compressions. Infant falling asleep at breast, MD in room wanting to assess infant. LC recommended mother pump, and infant be fed MBM and/or  PDHM this feeding, and attempt at breast again for next feeding. Provided supplementation handout. Discussed feeding plan below. Encouraged mother to let primary RN know if she would like lactation to return for feeding assistance or if questions arise. Mother aware of lactation resources available to her after discharging from hospital.     Plan: Skin-to-skin prior to feedings. Continue breastfeeding on-demand and/or every 2-3 hours. If infant unable to sustain latch, supplement and encourage mother to pump. Track feedings and diaper output.     Has Breast Pump for Home: Did not discuss.    Education given: Stages of milk production after delivery, [place] behaviors during first few days of life, Hunger cues, Breastfeeding positions, How to obtain & maintain a deep, comfortable latch, Listening & watching for swallows, How do I know if my baby is finished & getting enough, Benefits of skin-to-skin prior to feedings, Paced bottle feeding, Importance of tracking all feedings and diaper output, Hand expression, Nutritive vs non-nutritive sucking, Pumping for missed feedings at breast, Burping, Cluster feeding, How to tell if breastfeeding is going well, Supplementation volumes during first few days of life, [place] waking techniques, Banked donor breast milk, How to adjust a shallow latch, and Techniques for keeping infant actively sucking, including breast compressions.

## 2024-11-25 NOTE — DISCHARGE INSTRUCTIONS
*You have a Home Care nurse visit planned for Thursday 11/28/24. The nurse will contact you after discharge to confirm the appointment time. If you do not receive a call by Thursday morning, please call Home Care at [phone].   (Please do not schedule a clinic appointment on the same day as home nurse visit.)      Warning Signs after Having a Baby    Keep this paper on your fridge or somewhere else where you can see it.    Call your provider if you have any of these symptoms up to 12 weeks after having your baby.    Thoughts of hurting yourself or your baby  Pain in your chest or trouble breathing  Severe headache not helped by pain medicine  Eyesight concerns (blurry vision, seeing spots or flashes of light, other changes to eyesight)  Fainting, shaking or other signs of a seizure    Call 9-1-1 if you feel that it is an emergency.     The symptoms below can happen to anyone after giving birth. They can be very serious. Call your provider if you have any of these warning signs.    My provider s phone number: _______________________    Losing too much blood (hemorrhage)    Call your provider if you soak through a pad in less than an hour or pass blood clots bigger than a golf ball. These may be signs that you are bleeding too much.    Blood clots in the legs or lungs    After you give birth, your body naturally clots its blood to help prevent blood loss. Sometimes this increased clotting can happen in other areas of the body, like the legs or lungs. This can block your blood flow and be very dangerous.     Call your provider if you:  Have a red, swollen spot on the back of your leg that is warm or painful when you touch it.   Are coughing up blood.     Infection    Call your provider if you have any of these symptoms:  Fever of 100.4 F (38 C) or higher.  Pain or redness around your stitches if you had an incision.   Any yellow, white, or green fluid coming from places where you had stitches or surgery.    Mood  Problems (postpartum depression)    Many people feel sad or have mood changes after having a baby. But for some people, these mood swings are worse.     Call your provider right away if you feel so anxious or nervous that you can't care for yourself or your baby.    Preeclampsia (high blood pressure)    Even if you didn't have high blood pressure when you were pregnant, you are at risk for the high blood pressure disease called preeclampsia. This risk can last up to 12 weeks after giving birth.     Call your provider if you have:   Pain on your right side under your rib cage  Sudden swelling in the hands and face    Remember: You know your body. If something doesn't feel right, get medical help.     For informational purposes only. Not to replace the advice of your health care provider. Copyright 2020 [employer]. All rights reserved. Clinically reviewed by Glenis Watson, RNC-OB, MSN. [employer] 599525 - Rev 02/23.    Checking Your Blood Pressure at Home  During and after pregnancy  How do I measure my blood pressure?  It s important to take the readings at the same time each day, such as morning and evening. Take your blood pressure before taking any morning medications.  How to get the most accurate reading  30 minutes before checking your blood pressure, avoid the following:  Drinking caffeine  Drinking alcohol  Eating  Smoking  Exercising  5 minutes before checking your blood pressure:  Use the bathroom and urinate so you have an empty bladder.  Sit still in a chair for around 5 minutes. Stay calm and relaxed and do not talk if possible.  To check your blood pressure:     Sit up straight in a chair.  Place your feet on the floor. Don t cross your ankles or legs.  Rest your arm at the level of your heart on a table or desk or on the arm of a chair. Use the same arm every day.  Pull up your shirt sleeve. Don t take the measurement over clothes.  Wrap the blood pressure cuff around the upper part of  your left arm, 1 inch (2.5 cm) above your elbow.  Fit the cuff snugly around your arm. You should be able to place only one finger between the cuff and your arm.  Position the cord so that it rests in the bend of your elbow.  Press the power button.  Sit quietly while the cuff inflates and deflates.  Read the digital reading on the monitor screen and write the numbers down (record them) in a notebook.  Wait 2-3 minutes, then repeat the steps, starting at step 1.  Which features do you need?  Arm cuff monitors give the most exact readings.  Wrist and finger blood pressure monitors are often less exact.  Pick a blood pressure monitor that has passed tests to show they measure exactly. Blood pressure cuffs for sale in the [place] that have passed tests are listed on the website www.validatebp.org.  Some monitors that have passed tests are:  Omron 3 Series Upper Arm Blood Pressure Monitor (Model ER0394)  Omron 5 Series Upper Arm Blood Pressure Monitor (Model XU5977)  Omron 7 Series Upper Arm Blood Pressure Monitor (Model HEM-7320)  [employer] Medical Upper Arm Blood Pressure Monitor with Talking Function (UA 1030T)  Don t use smartphone apps. There are many smartphone apps that claim to check your blood pressure using the pulse in your wrist or finger. These don t work. They haven t passed any tests. Don t give your clinic a blood pressure reading from a smartphone trey.  If you have a flexible spending account (FSA) or health savings account (HSA), you may wish to pay yourself back (reimburse) for the machine and cuff. A blood pressure monitor is an allowed over-the- counter (OTC) item to pay yourself back from these accounts.  Cuff size  The size of the arm cuff is a key feature. Make sure the cuff is the right size for your arm. If the cuff isn t the right size, readings will either be too high or low.  To know what size cuff to buy, measure the distance around your bicep (upper arm).  Use a flexible measuring tape or paper  "ruler. Place the measuring tape prison between your armpit and elbow. Measure the distance around your arm in inches.  You may need to buy a cuff apart from the machine to get the right size.  Cuff sizes and arm measurements  Small adult: 22 to 26 cm (8.7 to 10.2 inches)  Adult: 27 to 34 cm (10.6 to 13.4 inches)  Large adult: 35 to 44 cm (13.8 to 17.3 inches)  Adult thigh: 45 to 52 cm (17.7 to 20.5 inches)    Copyright statement\" content=\"For informational purposes only. Not to replace the advice of your health care provider. Photo: ID [id number]   [employer]. Text copyright    [facility]. All rights reserved. Clinically reviewed by [facility]. [employer] [id number] - REV .     Section: What to Expect at Home  Your Recovery     A  section, or , is surgery to deliver your baby through a cut that the doctor makes in your lower belly and uterus. The cut is called an incision.  You may have some pain in your lower belly and need pain medicine for 1 to 2 weeks. You can expect some vaginal bleeding for several weeks. You will probably need about 6 weeks to fully recover.  It's important to take it easy while the incision heals. Avoid heavy lifting, strenuous activities, and exercises that strain the belly muscles while you recover. Ask a family member or friend for help with housework, cooking, and shopping.  This care sheet gives you a general idea about how long it will take for you to recover. But each person recovers at a different pace. Follow the steps below to get better as quickly as possible.  How can you care for yourself at home?  Activity    Rest when you feel tired. Getting enough sleep will help you recover.     Try to walk each day. Start by walking a little more than you did the day before. Bit by bit, increase the amount you walk. Walking boosts blood flow and helps prevent pneumonia, constipation, and blood clots.     " Avoid strenuous activities, such as bicycle riding, jogging, weightlifting, and aerobic exercise, for 6 weeks or until your doctor says it is okay.     Until your doctor says it is okay, do not lift anything heavier than your baby.     Do not do sit-ups or other exercises that strain the belly muscles for 6 weeks or until your doctor says it is okay.     Hold a pillow over your incision when you cough or take deep breaths. This will support your belly and decrease your pain.     You may shower as usual. Pat the incision dry when you are done.     You will have some vaginal bleeding. Wear sanitary pads. Do not douche or use tampons until your doctor says it is okay.     Ask your doctor when you can drive again.     You will probably need to take at least 6 weeks off work. It depends on the type of work you do and how you feel.     Ask your doctor when it is okay for you to have sex.   Diet    You can eat your normal diet. If your stomach is upset, try bland, low-fat foods like plain rice, broiled chicken, toast, and yogurt.     Drink plenty of fluids (unless your doctor tells you not to).     You may notice that your bowel movements are not regular right after your surgery. This is common. Try to avoid constipation and straining with bowel movements. You may want to take a fiber supplement every day. If you have not had a bowel movement after a couple of days, ask your doctor about taking a mild laxative.     If you are breastfeeding, limit alcohol. Alcohol can cause a lack of energy and other health problems for the baby when a breastfeeding woman drinks heavily. It can also get in the way of a mom's ability to feed her baby or to care for the child in other ways. There isn't a lot of research about exactly how much alcohol can harm a baby. Having no alcohol is the safest choice for your baby. If you choose to have a drink now and then, have only one drink, and limit the number of occasions that you have a drink.  Wait to breastfeed at least 2 hours after you have a drink to reduce the amount of alcohol the baby may get in the milk.   Medicines    Your doctor will tell you if and when you can restart your medicines. You will also get instructions about taking any new medicines.     If you stopped taking aspirin or some other blood thinner, your doctor will tell you when to start taking it again.     Take pain medicines exactly as directed.  If the doctor gave you a prescription medicine for pain, take it as prescribed.  If you are not taking a prescription pain medicine, ask your doctor if you can take an over-the-counter medicine.     If you think your pain medicine is making you sick to your stomach:  Take your medicine after meals (unless your doctor has told you not to).  Ask your doctor for a different pain medicine.     If your doctor prescribed antibiotics, take them as directed. Do not stop taking them just because you feel better. You need to take the full course of antibiotics.   Incision care    If you have strips of tape on the incision, leave the tape on for a week or until it falls off.     Wash the area daily with warm, soapy water, and pat it dry. Don't use hydrogen peroxide or alcohol, which can slow healing. You may cover the area with a gauze bandage if it weeps or rubs against clothing. Change the bandage every day.     Keep the area clean and dry.   Other instructions    If you breastfeed your baby, you may be more comfortable while you are healing if you don't rest your baby on your belly. Try tucking your baby under your arm, with your baby's body along the side you will be feeding on. Support your baby's upper body with your arm. With that hand you can control your baby's head to bring your baby's mouth to your breast. This is sometimes called the football hold.   Follow-up care is a key part of your treatment and safety. Be sure to make and go to all appointments, and call your doctor if you are  having problems. It's also a good idea to know your test results and keep a list of the medicines you take.  When should you call for help?  Share this information with your partner, family, or a friend. They can help you watch for warning signs.  Call 911  anytime you think you may need emergency care. For example, call if:    You feel you cannot stop from hurting yourself, your baby, or someone else.     You passed out (lost consciousness).     You have chest pain, are short of breath, or cough up blood.     You have a seizure.   Where to get help 24 hours a day, 7 days a week   If you or someone you know talks about suicide, self-harm, a mental health crisis, a substance use crisis, or any other kind of emotional distress, get help right away. You can:    Call the Suicide and Crisis Lifeline at 285.     Call 9-396-898-TALK (1-121.590.7920).     Text HOME to 378606 to access the Crisis Text Line.   Consider saving these numbers in your phone.  Go to [employer] for more information or to chat online.  Call your doctor or midwife now or seek immediate medical care if:    You have loose stitches, or your incision comes open.     You have signs of hemorrhage (too much bleeding), such as:  Heavy vaginal bleeding. This means that you are soaking through one or more pads in an hour. Or you pass blood clots bigger than an egg.  Feeling dizzy or lightheaded, or you feel like you may faint.  Feeling so tired or weak that you cannot do your usual activities.  A fast or irregular heartbeat.  New or worse belly pain.     You have symptoms of infection, such as:  Increased pain, swelling, warmth, or redness.  Red streaks leading from the incision.  Pus draining from the incision.  A fever.  Frequent or painful urination or blood in your urine.  Vaginal discharge that smells bad.  New or worse belly pain.     You have symptoms of a blood clot in your leg (called a deep vein thrombosis), such as:  Pain in the calf, back of  "the knee, thigh, or groin.  Swelling in the leg or groin.  A color change on the leg or groin. The skin may be reddish or purplish, depending on your usual skin color.     You have signs of preeclampsia, such as:  Sudden swelling of your face, hands, or feet.  New vision problems (such as dimness, blurring, or seeing spots).  A severe headache.     You have signs of heart failure, such as:  New or increased shortness of breath.  New or worse swelling in your legs, ankles, or feet.  Sudden weight gain, such as more than 2 to 3 pounds in a day or 5 pounds in a week.  Feeling so tired or weak that you cannot do your usual activities.     You had spinal or epidural pain relief and have:  New or worse back pain.  Increased pain, swelling, warmth, or redness at the injection site.  Tingling, weakness, or numbness in your legs or groin.   Watch closely for changes in your health, and be sure to contact your doctor or midwife if:    Your vaginal bleeding isn't decreasing.     You feel sad, anxious, or hopeless for more than a few days.     You are having problems with your breasts or breastfeeding.   Where can you learn more?  Go to https://www.[employer].net/patiented  Enter M806 in the search box to learn more about \" Section: What to Expect at Home.\"  Current as of: July 10, 2023  Content Version: 2024 [employer].   Care instructions adapted under license by your healthcare professional. If you have questions about a medical condition or this instruction, always ask your healthcare professional. [employer]wise[employer] disclaims any warranty or liability for your use of this information.    "

## 2024-11-25 NOTE — PROGRESS NOTES
Obstetrics Post-Op Progress Note         Assessment and Plan:    Assessment: Christa Erickson is a 29 year old  Post-operative day #1 s/p Low transverse primary  section doing well.     L&D complications: Failure to progress          Plan:   Ambulation encouraged  Pain control measures as needed           Interval History:   Doing well.  Pain is well-controlled.  No fevers.  No history of wound drainage, warmth or significant erythema.  Good appetite.  Denies chest pain, shortness of breath, nausea or vomiting.  Ambulatory.  Breastfeeding well.             Physical Exam:   Temp: 98.4  F (36.9  C) Temp src: Oral BP: 133/85 Pulse: 102   Resp: 18 SpO2: 96 % O2 Device: None (Room air)    Vitals:    24 0817   Weight: 97.1 kg (214 lb)     Vital Signs with Ranges  Temp:  [97.8  F (36.6  C)-98.7  F (37.1  C)] 98.4  F (36.9  C)  Pulse:  [] 102  Resp:  [16-18] 18  BP: (110-133)/(58-87) 133/85  SpO2:  [93 %-98 %] 96 %  I/O last 3 completed shifts:  In: 2840 [P.O.:1390; I.V.:1200; IV Piggyback:250]  Out: 3109 [Urine:1475; Blood:1634]    Uterine fundus is firm, non-tender and at the level of the umbilicus  Incision C/D/I          Data:     Recent Results (from the past 24 hours)   INR    Collection Time: 24 11:43 AM   Result Value Ref Range    INR 1.03 0.85 - 1.15   Partial thromboplastin time    Collection Time: 24 11:43 AM   Result Value Ref Range    aPTT 29 22 - 38 Seconds   Fibrinogen    Collection Time: 24 11:43 AM   Result Value Ref Range    Fibrinogen Activity 490 170 - 510 mg/dL   D dimer quantitative    Collection Time: 24 11:43 AM   Result Value Ref Range    D-Dimer Quantitative 1.45 (H) 0.00 - 0.50 ug/mL FEU   Extra Purple Top Tube    Collection Time: 24 12:03 PM   Result Value Ref Range    Hold Specimen JIC    Hemoglobin    Collection Time: 24  7:46 AM   Result Value Ref Range    Hemoglobin 11.9 11.7 - 15.7 g/dL     Recent Labs   Lab Test 24  5589    AS Negative     Recent Labs   Lab Test 11/25/24  0746 11/24/24  0829   HGB 11.9 13.2     Recent Labs   Lab Test 04/22/24  1604   RUQIGG Positive       Elier Hidalgo MD

## 2024-11-25 NOTE — PROGRESS NOTES
[facility] RN Care Coordinator Note    Christa Erickson  [id number]  1995    Chart reviewed, discharge plan discussed with care team, needs assessed. Patient requests home care visit, nurse visit planned Thursday 11/28 . [facility] Intake contacted, updated by this writer; patient added to [facility] schedule. Follow-up post-delivery appointment planned in 2 & 6 weeks at [facility].    RN Care Coordinator will continue to follow and assist as needed with discharge plan. Rosemarie Ayala RN on 11/25/2024 at 11:20 AM

## 2024-11-26 LAB — HOLD SPECIMEN: NORMAL

## 2024-11-26 PROCEDURE — 250N000013 HC RX MED GY IP 250 OP 250 PS 637: Performed by: FAMILY MEDICINE

## 2024-11-26 PROCEDURE — 120N000013 HC R&B IMCU

## 2024-11-26 PROCEDURE — 250N000013 HC RX MED GY IP 250 OP 250 PS 637: Performed by: OBSTETRICS & GYNECOLOGY

## 2024-11-26 RX ADMIN — NIFEDIPINE 30 MG: 30 TABLET, EXTENDED RELEASE ORAL at 21:00

## 2024-11-26 RX ADMIN — NIFEDIPINE 30 MG: 30 TABLET, EXTENDED RELEASE ORAL at 00:37

## 2024-11-26 RX ADMIN — ACETAMINOPHEN 975 MG: 325 TABLET ORAL at 10:53

## 2024-11-26 RX ADMIN — IBUPROFEN 800 MG: 800 TABLET ORAL at 17:32

## 2024-11-26 RX ADMIN — ACETAMINOPHEN 975 MG: 325 TABLET ORAL at 23:32

## 2024-11-26 RX ADMIN — IBUPROFEN 800 MG: 800 TABLET ORAL at 23:32

## 2024-11-26 RX ADMIN — IBUPROFEN 800 MG: 800 TABLET ORAL at 10:54

## 2024-11-26 RX ADMIN — CITALOPRAM HYDROBROMIDE 20 MG: 20 TABLET ORAL at 21:00

## 2024-11-26 RX ADMIN — PRENATAL VIT W/ FE FUMARATE-FA TAB 27-0.8 MG 1 TABLET: 27-0.8 TAB at 21:00

## 2024-11-26 RX ADMIN — SENNOSIDES AND DOCUSATE SODIUM 2 TABLET: 8.6; 5 TABLET ORAL at 10:54

## 2024-11-26 RX ADMIN — ACETAMINOPHEN 975 MG: 325 TABLET ORAL at 04:57

## 2024-11-26 RX ADMIN — ACETAMINOPHEN 975 MG: 325 TABLET ORAL at 17:32

## 2024-11-26 RX ADMIN — IBUPROFEN 800 MG: 800 TABLET ORAL at 04:57

## 2024-11-26 RX ADMIN — SENNOSIDES AND DOCUSATE SODIUM 1 TABLET: 8.6; 5 TABLET ORAL at 21:00

## 2024-11-26 ASSESSMENT — ACTIVITIES OF DAILY LIVING (ADL)
ADLS_ACUITY_SCORE: 24

## 2024-11-26 NOTE — PLAN OF CARE
Goal Outcome Evaluation:      Plan of Care Reviewed With: patient, spouse    Overall Patient Progress: improvingOverall Patient Progress: improving    Outcome Evaluation: VSS. Postpartum assessment within normal limits    Postpartum assessment is within normal limits. Pain managed with Ibuprofen and Tylenol. Hypertension protocol started at 2300. Patient denies s/s of HTN, reflexes normal to brisk, new clonus noted in the right foot at 0500 eval. Nifedipine started at 2300 this shift. [place]  depression scale completed. Birth certificate completed. Parents have to complete shaken baby video.  Patient desires to go home today. Attentive to infant and independent with cares.     Joie Rivers RN on 2024 at 6:04 AM

## 2024-11-26 NOTE — PLAN OF CARE
"Goal Outcome Evaluation:      Plan of Care Reviewed With: patient    Overall Patient Progress: improvingOverall Patient Progress: improving       Problem: Adult Inpatient Plan of Care  Goal: Optimal Comfort and Wellbeing  Outcome: Met  Intervention: Provide Person-Centered Care  Recent Flowsheet Documentation  Taken 2024 1200 by Sangeeta Perla  Trust Relationship/Rapport:   care explained   choices provided   emotional support provided   empathic listening provided   questions answered   questions encouraged   reassurance provided   thoughts/feelings acknowledged     Problem: Postpartum ( Delivery)  Goal: Successful Parent Role Transition  Outcome: Met  Hypertension all other vitals WNL. +1 edema bilaterally on feet and ankles. Clonus present beat of 1. Voiding without difficulty. Uterus firm, midline, 1cm below with light lochia. Wheezing upon expiration noted. Hx of asthma, has inhaler from home at beside. Reports 1/10 pain, relief with pain meds. Pt and  attentive to baby's needs.     BP (!) 150/87 (BP Location: Left arm, Cuff Size: Adult Regular)   Pulse 99   Temp 98.1  F (36.7  C) (Oral)   Resp 20   Ht 1.727 m (5' 8\")   Wt 92 kg (202 lb 13 oz)   LMP 02/10/2024 (Exact Date)   SpO2 98%   Breastfeeding Unknown   BMI 30.84 kg/m          "

## 2024-11-26 NOTE — PLAN OF CARE
Mom rates pain low. She is taking Tylenol and Ibuprofen for this.  Her fundus is firm with scant amount of lochia.  Her BP was 140/76. This is her first elevated BP.   She is up independently. Spouse is here and is supportive. Mom is breastfeeding and supplementing. For this shift, mom chose to supplement.    Problem: Adult Inpatient Plan of Care  Goal: Plan of Care Review  Description: The Plan of Care Review/Shift note should be completed every shift.  The Outcome Evaluation is a brief statement about your assessment that the patient is improving, declining, or no change.  This information will be displayed automatically on your shift  note.  Outcome: Progressing     Problem: Adult Inpatient Plan of Care  Goal: Optimal Comfort and Wellbeing  Outcome: Progressing  Intervention: Monitor Pain and Promote Comfort  Recent Flowsheet Documentation  Taken 11/25/2024 1600 by Dilcia Montero RN  Pain Management Interventions: rest  Intervention: Provide Person-Centered Care  Recent Flowsheet Documentation  Taken 11/25/2024 1600 by Dilcia Montero RN  Trust Relationship/Rapport:   care explained   choices provided   emotional support provided   empathic listening provided   questions answered   questions encouraged   reassurance provided   thoughts/feelings acknowledged   Goal Outcome Evaluation:

## 2024-11-26 NOTE — PROVIDER NOTIFICATION
Notified MD at 1400 PM regarding change in condition.      Comments: Expiratory wheezing when assessed. All other vitals normal, patient was upset and tearful at the time and not experiencing any SOB.    Spoke with: megan Covington to take home inhaler if needed.    Orders were obtained.

## 2024-11-26 NOTE — PROGRESS NOTES
Obstetrics Post-Op Progress Note         Assessment and Plan:    Assessment: Christa Erickson is a 29 year old  Post-operative day #2 s/p Low transverse primary  section doing well today.  She had elevated blood pressures yesterday and was started on Nifedipine 30 mg daily.  She had her first dose last night at 2300.  Preeclampsia labs were normal.      L&D complications: Failure to progress in labor [O62.2]  Post partum hypertension.           Plan:   Ambulation encouraged  Breast feeding strategies discussed  Continue monitoring BP  Reportable signs and symptoms dicussed with the patient  Anticipate discharge tomorrow           Interval History:   Doing well.  Pain is well-controlled.  No fevers.  No history of wound drainage, warmth or significant erythema.  Good appetite.  Denies chest pain, shortness of breath, nausea or vomiting.  Ambulatory.  Breastfeeding well.             Physical Exam:   Temp: 97.8  F (36.6  C) Temp src: Oral BP: 134/80 Pulse: 99   Resp: 18 SpO2: 98 % O2 Device: None (Room air)    Vitals:    24 0817 24 0531   Weight: 97.1 kg (214 lb) 92 kg (202 lb 13 oz)     Vital Signs with Ranges  Temp:  [97.5  F (36.4  C)-98.8  F (37.1  C)] 97.8  F (36.6  C)  Pulse:  [] 99  Resp:  [16-18] 18  BP: (130-142)/(76-92) 134/80  SpO2:  [95 %-98 %] 98 %  I/O last 3 completed shifts:  In: 650 [P.O.:650]  Out: 1750 [Urine:1750]    Uterine fundus is firm, non-tender and at the level of the umbilicus  Incision C/D/I          Data:     Recent Results (from the past 24 hours)   CBC with platelets    Collection Time: 24 11:24 PM   Result Value Ref Range    WBC Count 12.0 (H) 4.0 - 11.0 10e3/uL    RBC Count 3.73 (L) 3.80 - 5.20 10e6/uL    Hemoglobin 10.9 (L) 11.7 - 15.7 g/dL    Hematocrit 33.0 (L) 35.0 - 47.0 %    MCV 89 78 - 100 fL    MCH 29.2 26.5 - 33.0 pg    MCHC 33.0 31.5 - 36.5 g/dL    RDW 13.4 10.0 - 15.0 %    Platelet Count 201 150 - 450 10e3/uL   Comprehensive Metabolic  Panel    Collection Time: 11/25/24 11:24 PM   Result Value Ref Range    Sodium 138 135 - 145 mmol/L    Potassium 4.2 3.4 - 5.3 mmol/L    Carbon Dioxide (CO2) 23 22 - 29 mmol/L    Anion Gap 8 7 - 15 mmol/L    Urea Nitrogen 6.9 6.0 - 20.0 mg/dL    Creatinine 0.65 0.51 - 0.95 mg/dL    GFR Estimate >90 >60 mL/min/1.73m2    Calcium 8.6 (L) 8.8 - 10.4 mg/dL    Chloride 107 98 - 107 mmol/L    Glucose 108 (H) 70 - 99 mg/dL    Alkaline Phosphatase 133 40 - 150 U/L    AST 17 0 - 45 U/L    ALT 8 0 - 50 U/L    Protein Total 5.7 (L) 6.4 - 8.3 g/dL    Albumin 3.0 (L) 3.5 - 5.2 g/dL    Bilirubin Total 0.2 <=1.2 mg/dL   Extra Blue Top Tube (LAB USE ONLY)    Collection Time: 11/25/24 11:24 PM   Result Value Ref Range    Hold Specimen [facility]      Recent Labs   Lab Test 11/24/24  0830   AS Negative     Recent Labs   Lab Test 11/25/24  2324 11/25/24  0746   HGB 10.9* 11.9     Recent Labs   Lab Test 04/22/24  1604   RUQIGG Positive       NOELLE Cox CNP  [facility]  [phone]

## 2024-11-26 NOTE — LACTATION NOTE
This note was copied from a baby's chart.  Lactation Note:      Per bedside RN, mother states she is unsure if she would like to continue breastfeeding. Patient declines lactation today, and would like lactation to touch base with primary RN prior to visiting tomorrow. Lactation will continue to be available upon request today.

## 2024-11-26 NOTE — PROVIDER NOTIFICATION
Dr Malik called and informed of elevated BP X2, no critical range BP. Labs will be ordered stat and pt will be started on Nifedipine. Pt cont to deny pain or sx of preeclampsia. Stable POD#1. Check lab values.

## 2024-11-27 ENCOUNTER — PATIENT OUTREACH (OUTPATIENT)
Dept: CARE COORDINATION | Facility: CLINIC | Age: 29
End: 2024-11-27
Payer: COMMERCIAL

## 2024-11-27 VITALS
DIASTOLIC BLOOD PRESSURE: 83 MMHG | BODY MASS INDEX: 30.65 KG/M2 | RESPIRATION RATE: 18 BRPM | HEIGHT: 68 IN | OXYGEN SATURATION: 98 % | TEMPERATURE: 98.3 F | SYSTOLIC BLOOD PRESSURE: 132 MMHG | WEIGHT: 202.2 LBS | HEART RATE: 101 BPM

## 2024-11-27 PROBLEM — O13.9 GESTATIONAL HYPERTENSION: Status: ACTIVE | Noted: 2024-11-27

## 2024-11-27 PROCEDURE — 250N000013 HC RX MED GY IP 250 OP 250 PS 637: Performed by: OBSTETRICS & GYNECOLOGY

## 2024-11-27 PROCEDURE — 250N000013 HC RX MED GY IP 250 OP 250 PS 637

## 2024-11-27 RX ORDER — IBUPROFEN 200 MG
800 TABLET ORAL EVERY 8 HOURS PRN
COMMUNITY
Start: 2024-11-27

## 2024-11-27 RX ORDER — OXYCODONE HYDROCHLORIDE 5 MG/1
5 TABLET ORAL EVERY 4 HOURS PRN
Qty: 10 TABLET | Refills: 0 | Status: SHIPPED | OUTPATIENT
Start: 2024-11-27

## 2024-11-27 RX ORDER — NIFEDIPINE 30 MG
30 TABLET, EXTENDED RELEASE ORAL DAILY
Status: DISCONTINUED | OUTPATIENT
Start: 2024-11-27 | End: 2024-11-27 | Stop reason: HOSPADM

## 2024-11-27 RX ORDER — HYDROXYZINE HYDROCHLORIDE 50 MG/1
50 TABLET, FILM COATED ORAL EVERY 6 HOURS PRN
Status: DISCONTINUED | OUTPATIENT
Start: 2024-11-27 | End: 2024-11-27 | Stop reason: HOSPADM

## 2024-11-27 RX ORDER — AMOXICILLIN 250 MG
1 CAPSULE ORAL 2 TIMES DAILY
COMMUNITY
Start: 2024-11-27

## 2024-11-27 RX ORDER — FUROSEMIDE 20 MG/1
40 TABLET ORAL DAILY
Status: COMPLETED | OUTPATIENT
Start: 2024-11-27 | End: 2024-11-27

## 2024-11-27 RX ORDER — ACETAMINOPHEN 325 MG/1
975 TABLET ORAL EVERY 6 HOURS PRN
COMMUNITY
Start: 2024-11-27

## 2024-11-27 RX ORDER — NIFEDIPINE 30 MG
30 TABLET, EXTENDED RELEASE ORAL 2 TIMES DAILY
Qty: 60 TABLET | Refills: 3 | Status: SHIPPED | OUTPATIENT
Start: 2024-11-27

## 2024-11-27 RX ADMIN — SENNOSIDES AND DOCUSATE SODIUM 2 TABLET: 8.6; 5 TABLET ORAL at 08:31

## 2024-11-27 RX ADMIN — NIFEDIPINE 30 MG: 30 TABLET, EXTENDED RELEASE ORAL at 06:27

## 2024-11-27 RX ADMIN — ACETAMINOPHEN 975 MG: 325 TABLET ORAL at 11:53

## 2024-11-27 RX ADMIN — ACETAMINOPHEN 975 MG: 325 TABLET ORAL at 05:05

## 2024-11-27 RX ADMIN — FUROSEMIDE 40 MG: 20 TABLET ORAL at 11:53

## 2024-11-27 RX ADMIN — IBUPROFEN 800 MG: 800 TABLET ORAL at 05:05

## 2024-11-27 ASSESSMENT — ACTIVITIES OF DAILY LIVING (ADL)
ADLS_ACUITY_SCORE: 24

## 2024-11-27 NOTE — PROVIDER NOTIFICATION
11/27/24 0430   Provider Notification   Provider Name/Title Dr. Thompson   Method of Notification Phone   Request Evaluate-Remote   Notification Reason Vital Signs Change     Notified Dr. Thompson of patient's two consecutive elevated BPs per the HTN orderset. Patient has one beat of clonus in the right foot. Denies s/s of preeclampsia. Provider ordered 30mg 24hr Nifedipine to be given early at 0600. Writer adjusted times in the MAR.     Hanna Cage RN on 11/27/2024 at 4:51 AM

## 2024-11-27 NOTE — PROVIDER NOTIFICATION
Notified PA at 1200 PM regarding change in condition.      Spoke with: Jessica Moritz, ok to use at home albuterol.    Orders were obtained.    Comments: Bilateral wheezing noted with lung sounds, inspiratory and expiratory.

## 2024-11-27 NOTE — PLAN OF CARE
Goal Outcome Evaluation:      Plan of Care Reviewed With: patient    Overall Patient Progress: improving    Outcome Evaluation: Patient's BPs slightly elevated overnight. Denies s/s of preeclampsia. Reflexes average, has 1 beat of clonus in right foot. Updated provider per the orders - not too worried as pressures are only slightly elevated, but would like to move her daily dose of nifedipine to 0600 today before they discharge home. OB assessments WNL. Fundus firm, bleeding scant, no new drainage. Wearing binder for comfort. Ambulating and voiding independently. Patient is tracking her own I+Os. Patient appears exhausted and anxious. Feeding infant donor milk every 2 hours - plan for home is formula. No pumping or breastfeeding. Spouse is supportive at bedside.    Hanna Cage RN on 11/27/2024 at 5:24 AM

## 2024-11-27 NOTE — PROVIDER NOTIFICATION
Notified MD at 1513 PM regarding order clarification.      Spoke with: megan Borrego to discharge.    Orders were obtained.    Comments: Called to review BP's and if patient can go home.

## 2024-11-27 NOTE — DISCHARGE SUMMARY
Discharge Summary    Christa Erickson MRN# [id number]   Age: 29 year old YOB: 1995     Date of Admission:  2024  Date of Discharge::  2024  Admitting Physician:  Emily Avalos MD  Discharge Physician:  Jessica Moritz, APRN CNP     Home clinic: [facility]          Admission Diagnoses:   Encounter for triage in pregnant patient  Encounter for induction of labor  Gestational hypertension  Intrauterine pregnancy at 41w1d  GBS positive          Discharge Diagnosis:   Same   Acute blood loss anemia, asymptomatic  S/p  delivery  Postpartum hemorrhage           Procedures:     Procedure(s): Low transverse primary  delivery via Pfannenstiel incision  No additional procedures performed              Medications Prior to Admission:     Medications Prior to Admission   Medication Sig Dispense Refill Last Dose/Taking    citalopram (CELEXA) 20 MG tablet TAKE 1 TABLET(20 MG) BY MOUTH DAILY 90 tablet 3 2024 Morning    loratadine (CLARITIN) 10 MG tablet Take 10 mg by mouth daily   2024 Morning    magnesium gluconate (MAGONATE) 500 (27 Mg) MG tablet Take 500 mg by mouth 2 times daily.   2024 Morning    Prenatal Vit-Fe Fumarate-FA (PNV PRENATAL PLUS MULTIVITAMIN) 27-1 MG TABS per tablet Take 1 tablet by mouth daily   2024 Morning    QVAR REDIHALER 40 MCG/ACT inhaler 1 puff twice daily       VENTOLIN  (90 Base) MCG/ACT inhaler Inhale 2 puffs into the lungs every 4 hours as needed for wheezing       [DISCONTINUED] ondansetron (ZOFRAN ODT) 4 MG ODT tab Take 1 tablet (4 mg) by mouth every 8 hours as needed for nausea 20 tablet 1              Discharge Medications:        Review of your medicines        START taking        Dose / Directions   acetaminophen 325 MG tablet  Commonly known as: TYLENOL  Used for: Status post primary low transverse  section      Dose: 975 mg  Take 3 tablets (975 mg) by mouth every 6 hours as needed for  mild pain.  Refills: 0     ibuprofen 200 MG tablet  Commonly known as: ADVIL/MOTRIN  Used for: Status post primary low transverse  section      Dose: 800 mg  Take 4 tablets (800 mg) by mouth every 8 hours as needed for moderate pain.  Refills: 0     NIFEdipine ER 30 MG 24 hr tablet  Commonly known as: ADALAT CC      Dose: 30 mg  Take 1 tablet (30 mg) by mouth 2 times daily.  Quantity: 60 tablet  Refills: 3     oxyCODONE 5 MG tablet  Commonly known as: ROXICODONE  Used for: Status post primary low transverse  section      Dose: 5 mg  Take 1 tablet (5 mg) by mouth every 4 hours as needed for moderate to severe pain.  Quantity: 10 tablet  Refills: 0     senna-docusate 8.6-50 MG tablet  Commonly known as: SENOKOT-S/PERICOLACE  Used for: Status post primary low transverse  section      Dose: 1 tablet  Take 1 tablet by mouth 2 times daily.  Refills: 0            CONTINUE these medicines which have NOT CHANGED        Dose / Directions   citalopram 20 MG tablet  Commonly known as: celeXA  Used for: OPAL (generalized anxiety disorder)      TAKE 1 TABLET(20 MG) BY MOUTH DAILY  Quantity: 90 tablet  Refills: 3     loratadine 10 MG tablet  Commonly known as: CLARITIN      Dose: 10 mg  Take 10 mg by mouth daily  Refills: 0     magnesium gluconate 500 (27 Mg) MG tablet  Commonly known as: MAGONATE      Dose: 500 mg  Take 500 mg by mouth 2 times daily.  Refills: 0     PNV Prenatal Plus Multivitamin 27-1 MG Tabs per tablet      Dose: 1 tablet  Take 1 tablet by mouth daily  Refills: 0     Qvar RediHaler 40 MCG/ACT inhaler  Used for: Mild intermittent asthma without complication, OPAL (generalized anxiety disorder)  Generic drug: beclomethasone HFA      1 puff twice daily  Refills: 0     Ventolin  (90 Base) MCG/ACT inhaler  Used for: Mild intermittent asthma without complication, OPAL (generalized anxiety disorder)  Generic drug: albuterol      Dose: 2 puff  Inhale 2 puffs into the lungs every 4 hours as  needed for wheezing  Refills: 0            STOP taking      ondansetron 4 MG ODT tab  Commonly known as: ZOFRAN ODT                  Where to get your medicines        These medications were sent to [id number] - [place], [place] - [address] AT [facility] [address]  [address], [place] [facility] [address]      Phone: [phone]   NIFEdipine ER 30 MG 24 hr tablet  oxyCODONE 5 MG tablet       Some of these will need a paper prescription and others can be bought over the counter. Ask your nurse if you have questions.    You don't need a prescription for these medications  acetaminophen 325 MG tablet  ibuprofen 200 MG tablet  senna-docusate 8.6-50 MG tablet               Consultations:   Lactation  IHOB          Brief History of Labor:   Christa Erickson is a 29 year old  who presented to labor and delivery for induction of labor. She received cervadil, followed by PO cytotec, followed by cook catheter placement with low dose pitocin. The catheter was removed and she was able to undergo AROM. She received an epidural. She progressed to complete and pushed for 5 hours. IHOB was consulted. She was offered a vacuum attempt or a  section. Christa opted for a  section.            Hospital Course:   The patient's hospital course was significant for developing gestational hypertension postpartum, postpartum hemorrhage, and acute blood loss anemia, asymptomatic--managed with IV fluids, fundal massage, and uterotonics. She was started on nifedipine XL 30 mg BID on 24. Labs WNL. She recovered as anticipated and experienced no post-operative complications.  On discharge, her pain was well controlled. Vaginal bleeding is similar to peak menstrual flow.  Voiding without difficulty.  Ambulating well and tolerating a normal diet.  No fever or significant wound drainage.  Pumping and giving donor milk.  Infant is stable.  She was discharged on post-partum day #3. She will  "resume her prenatal with iron.    Denies s/s of headache, visual disturbances, epigastric pain, and RUQ pain    Feeling emotional and desires to go home today if at all possible. Last significantly elevated BP was 150/87 at 1200 on 11/26/24. The rest have been 121-149/75-91 over the last 24 hours.     Will give 40 mg Lasix PO x1 dose. If blood pressures remain below 150/100 she may discharge after 1600 today. She has a home care visit scheduled for tomorrow.     Post-partum hemoglobin:   Hemoglobin   Date Value Ref Range Status   11/25/2024 10.9 (L) 11.7 - 15.7 g/dL Final       Day of discharge assessment:   /75 (BP Location: Right arm)   Pulse 95   Temp 97.8  F (36.6  C) (Oral)   Resp 18   Ht 1.727 m (5' 8\")   Wt 91.7 kg (202 lb 3.2 oz)   LMP 02/10/2024 (Exact Date)   SpO2 98%   Breastfeeding Unknown   BMI 30.74 kg/m    A and O x3  HRR  Lungs CTA  Abdomen: Soft, fundus firm, incision CDI. Primapore on, nurse to remove prior to discharge  Reflexes +2 bilaterally, patellar  No clonus bilaterally  +3 bilateral pedal and lower extremity edema             Discharge Instructions and Follow-Up:     Discharge diet: Regular   Discharge activity: Your activity upon discharge: no lifting >15 lbs or strenuous exercise for 6 weeks, no driving for 2 weeks or until you can slam on the breaks w/o pain, nothing in the vagina for 6 weeks (no tampons, intercourse, douching).    Discharge follow-up: Monday 12/2/24 at [facility] for a blood pressure check  Two weeks for incision check at [facility]  6 weeks for postpartum visit with Dr. Avalos   Wound care: Keep incision clean and dry.           Discharge Disposition:     Discharged to home      Attestation:  I have reviewed today's vital signs, notes, medications, labs and imaging.  Amount of time performed on this discharge summary: 15 minutes.  Total time: 30 minutes    Jessica Moritz, APRN CNP           "

## 2024-11-27 NOTE — PLAN OF CARE
"Goal Outcome Evaluation:      Plan of Care Reviewed With: patient    Overall Patient Progress: improvingOverall Patient Progress: improving       Problem: Adult Inpatient Plan of Care  Goal: Optimal Comfort and Wellbeing  Intervention: Provide Person-Centered Care  Recent Flowsheet Documentation  Taken 2024 by Sangeeta Perla  Trust Relationship/Rapport:   care explained   choices provided   emotional support provided   empathic listening provided   questions encouraged   reassurance provided   thoughts/feelings acknowledged   questions answered     Problem: Postpartum ( Delivery)  Goal: Successful Parent Role Transition  Intervention: Support Parent Role Transition  Recent Flowsheet Documentation  Taken 2024 by Sangeeta Perla  Supportive Measures:   active listening utilized   decision-making supported   goal-setting facilitated   relaxation techniques promoted   problem-solving facilitated  Parent-Child Attachment Promotion:   caring behavior modeled   cue recognition promoted   positive reinforcement provided  HTN all other vitals WNL. +2 edema bilaterally ankles and feet. Given nifedipine and furosemide. Education given on HTN and how to perform BP at home. Pt given BP device, pt will continue to monitor at home. Reports no pain. Incision open to air, dry, intact. Small bruise noted on her left hand. Pt and  attentive to baby's needs. Home health care appointment scheduled for tomorrow, will follow up with OB provider next week.     /83 (BP Location: Left arm)   Pulse 101   Temp 98.3  F (36.8  C) (Oral)   Resp 18   Ht 1.727 m (5' 8\")   Wt 91.7 kg (202 lb 3.2 oz)   LMP 02/10/2024 (Exact Date)   SpO2 98%   Breastfeeding Unknown   BMI 30.74 kg/m      "

## 2024-11-27 NOTE — PLAN OF CARE
Problem: Postpartum ( Delivery)  Goal: Effective Urinary Elimination  2024 by Meeta Madrid RN  Outcome: Progressing  2024 by Meeta Madrid RN  Outcome: Progressing  2024 by Meeta Madrid RN  Reactivated     Problem: Postpartum ( Delivery)  Goal: Optimal Pain Control and Function  2024 by Meeta Madrid RN  Outcome: Progressing     Problem: Postpartum ( Delivery)  Goal: Successful Parent Role Transition  2024 by Meeta Madrid RN  Outcome: Progressing    Goal Outcome Evaluation:      Plan of Care Reviewed With: patient, spouse    Overall Patient Progress: improvingOverall Patient Progress: improving    Outcome Evaluation: Patient able to make needs known. VSS. Blood pressure is below severe range. Ambulating independently in room. Pain is tolerable with scheduled tylenol and ibuprofen. Denies of any headaches, vision changes, or pain in upper right abdomen. Bleeding with WNL. Fundus is firm. Clonus present and remains unchange from last RN assessment. Reflexes WNL. Normal postpartum cares.

## 2024-11-27 NOTE — LACTATION NOTE
"Follow up Lactation Visit    Current age : 2 day 23 hours old    Gestational age at delivery: 41w1d     Diaper count: 5 wet 2 soiled     Feedings: 0 breast 10 supplement     Weight Loss: 5%     Breastfeeding goals: \"wants her feed\"  unsure about pumping       Past breastfeeding experience:first     Maternal health risk that may affect breastfeeding:QBL >1000    Home Pump: medela     Feeding assessment: did not see a feeding-     Feeding plan: at this time mom wants to bottle feed only- she is unsure if her feeding goals include breastmilk/pumping.     Discussed with mom that I would place on discharge paperwork a pumping plan and engorgement plan.       Education:   [] Expected  feeding patterns in the first few days (pg. 38 of Your Guide to To Postpartum and  Care)/ the Second Night  [] Stages of milk production  [] Hand expression   [] Feeding cues     [] Benefits of skin to skin  [] Breastfeeding positions  [] Tips to get and maintain a deep latch  [] Usage of nipple shield  [] Nutritive vs.non-nutritive sucking  [] Gentle breast compressions as needed  [] How to tell when baby is finished  [] Supplementation  [] Paced bottle feeding  [] Spoon/cup feeding  [] LPI feeding patterns  [] LBW feeding patterns  [] Expected  output  []  weight loss  [] Overall goals/How to know baby is getting enough  [] Infant Feeding Log  [] Calming techniques  [x] Engorgement/Reverse Pressure Softening  [x] Pumping  [] Breastpump education  [] Inpatient breastfeeding support  [x] Outpatient lactation resources    Follow up: Encouraged follow up with OP lactation if needed.     "

## 2024-11-28 ENCOUNTER — MEDICAL CORRESPONDENCE (OUTPATIENT)
Dept: HEALTH INFORMATION MANAGEMENT | Facility: CLINIC | Age: 29
End: 2024-11-28
Payer: COMMERCIAL

## 2024-12-05 DIAGNOSIS — F41.1 GAD (GENERALIZED ANXIETY DISORDER): ICD-10-CM

## 2024-12-05 RX ORDER — CITALOPRAM HYDROBROMIDE 20 MG/1
TABLET ORAL
Qty: 90 TABLET | Refills: 3 | Status: SHIPPED | OUTPATIENT
Start: 2024-12-05

## 2024-12-05 NOTE — TELEPHONE ENCOUNTER
Writer called patient and verified she is still taking Citalopram 20 mg daily.    TIANNA Delgadillo, RN  [facility]

## 2025-01-19 PROBLEM — Z34.90 ENCOUNTER FOR INDUCTION OF LABOR: Status: RESOLVED | Noted: 2024-11-21 | Resolved: 2025-01-19

## 2025-01-19 PROBLEM — Z36.89 ENCOUNTER FOR TRIAGE IN PREGNANT PATIENT: Status: RESOLVED | Noted: 2024-10-30 | Resolved: 2025-01-19

## 2025-01-20 NOTE — PROGRESS NOTES
Clinic Note - Postpartum Visit    Christa Erickson is a 29 year old  female presenting for routine postpartum follow up.    Date of delivery was  via primary c/s at 41+1 weeks  Complications noted during the pregnancy include obesity, anxiety on selective serotonin reuptake inhibitor, hx asthma.    Complications at the time of delivery include same plus arrest of second stage of labor.      The patient notes no worrisome bleeding since since the time of delivery - got period on Friday.  She has no continued pain.  Patient denies concerns with postpartum blues/depression.  Patient has not resumed sexual activity and is interested in contraception at this time.  Still taking nifedipine.     ROS:  General: No fevers.  Cardiac: No chest pain or palpitations.  Breasts: No redness, warmth, pain.   Pulmonary: No shortness of breath or respiratory distress.  GI: No abdominal pain.  Normal bowel habits, no incontinence.  : No dysuria, hematuria, no incontinence.  Extremities: No edema.    Allergies   Allergen Reactions    Tree Nuts [Nuts] Itching and Swelling       Current Outpatient Medications   Medication Sig Dispense Refill    citalopram (CELEXA) 20 MG tablet TAKE 1 TABLET(20 MG) BY MOUTH DAILY 90 tablet 3    desogestrel-ethinyl estradiol (APRI) 0.15-30 MG-MCG tablet Take 1 tablet by mouth daily. 84 tablet 3    NIFEdipine ER (ADALAT CC) 30 MG 24 hr tablet Take 1 tablet (30 mg) by mouth 2 times daily. 60 tablet 3    Prenatal Vit-Fe Fumarate-FA (PNV PRENATAL PLUS MULTIVITAMIN) 27-1 MG TABS per tablet Take 1 tablet by mouth daily      QVAR REDIHALER 40 MCG/ACT inhaler 1 puff twice daily      VENTOLIN  (90 Base) MCG/ACT inhaler Inhale 2 puffs into the lungs every 4 hours as needed for wheezing         Past Medical History:   Diagnosis Date    Abdominal migraine 2015    OPAL (generalized anxiety disorder)     Migraine headache without aura     Uncomplicated asthma        OB History    Para Term  " AB Living   1 1 1 0 0 1   SAB IAB Ectopic Multiple Live Births   0 0 0 0 1      # Outcome Date GA Lbr Alphonse/2nd Weight Sex Type Anes PTL Lv   1 Term 24 41w1d 02:06 / 06:20 3.96 kg (8 lb 11.7 oz) F CS-LTranv IV, EPI, Spinal N RAMONE      Name: Nori Erickson      Apgar1: 7  Apgar5: 9       /62   Pulse 98   Resp 16   Ht 1.715 m (5' 7.5\")   Wt 83.2 kg (183 lb 8 oz)   LMP 2024 (Exact Date)   SpO2 98%   Breastfeeding No   BMI 28.32 kg/m     General: no apparent distress, appears well  Cardiovascular: regular rate and rhythm without murmur  Pulmonary: Breathing comfortably on room air  Abdomen: Soft, non-tender.  No rebound or guarding.   GYN: uterus is normal non-gravid size and nontender - incision well-healed  Lower extremity: no significant swelling    Assessment/Plan  Routine postpartum follow-up  Encounter for initial prescription of contraceptive pills  Overall doing well and adjusting to new baby. Post partum depression has not been a concern. Desires OCP for contraception. Ok to resume normal activities without restriction.   - desogestrel-ethinyl estradiol (APRI) 0.15-30 MG-MCG tablet  Dispense: 84 tablet; Refill: 3    Gestational hypertension, antepartum  Discussed weaning off nifedipine today, pt will taper off and monitor BP at home.      Emily Avalos MD  [facility]      "

## 2025-01-23 ENCOUNTER — MEDICAL CORRESPONDENCE (OUTPATIENT)
Dept: HEALTH INFORMATION MANAGEMENT | Facility: CLINIC | Age: 30
End: 2025-01-23

## 2025-01-23 ENCOUNTER — PRENATAL OFFICE VISIT (OUTPATIENT)
Dept: FAMILY MEDICINE | Facility: CLINIC | Age: 30
End: 2025-01-23
Payer: COMMERCIAL

## 2025-01-23 VITALS
HEART RATE: 98 BPM | BODY MASS INDEX: 27.81 KG/M2 | RESPIRATION RATE: 16 BRPM | HEIGHT: 68 IN | SYSTOLIC BLOOD PRESSURE: 124 MMHG | OXYGEN SATURATION: 98 % | WEIGHT: 183.5 LBS | DIASTOLIC BLOOD PRESSURE: 62 MMHG

## 2025-01-23 DIAGNOSIS — O13.9 GESTATIONAL HYPERTENSION, ANTEPARTUM: ICD-10-CM

## 2025-01-23 DIAGNOSIS — Z30.011 ENCOUNTER FOR INITIAL PRESCRIPTION OF CONTRACEPTIVE PILLS: ICD-10-CM

## 2025-01-23 RX ORDER — DESOGESTREL AND ETHINYL ESTRADIOL 0.15-0.03
1 KIT ORAL DAILY
Qty: 84 TABLET | Refills: 3 | Status: SHIPPED | OUTPATIENT
Start: 2025-01-23

## 2025-05-28 ENCOUNTER — MEDICAL CORRESPONDENCE (OUTPATIENT)
Dept: HEALTH INFORMATION MANAGEMENT | Facility: CLINIC | Age: 30
End: 2025-05-28
Payer: COMMERCIAL

## 2025-07-19 ENCOUNTER — HEALTH MAINTENANCE LETTER (OUTPATIENT)
Age: 30
End: 2025-07-19

## 2025-08-29 PROCEDURE — 99285 EMERGENCY DEPT VISIT HI MDM: CPT | Mod: 25 | Performed by: EMERGENCY MEDICINE

## 2025-08-29 RX ORDER — ONDANSETRON 2 MG/ML
4 INJECTION INTRAMUSCULAR; INTRAVENOUS ONCE
Status: COMPLETED | OUTPATIENT
Start: 2025-08-30 | End: 2025-08-30

## 2025-08-29 ASSESSMENT — COLUMBIA-SUICIDE SEVERITY RATING SCALE - C-SSRS
6. HAVE YOU EVER DONE ANYTHING, STARTED TO DO ANYTHING, OR PREPARED TO DO ANYTHING TO END YOUR LIFE?: NO
1. IN THE PAST MONTH, HAVE YOU WISHED YOU WERE DEAD OR WISHED YOU COULD GO TO SLEEP AND NOT WAKE UP?: NO
2. HAVE YOU ACTUALLY HAD ANY THOUGHTS OF KILLING YOURSELF IN THE PAST MONTH?: NO

## 2025-08-30 ENCOUNTER — APPOINTMENT (OUTPATIENT)
Dept: ULTRASOUND IMAGING | Facility: CLINIC | Age: 30
End: 2025-08-30
Attending: EMERGENCY MEDICINE
Payer: COMMERCIAL

## 2025-08-30 ENCOUNTER — HOSPITAL ENCOUNTER (EMERGENCY)
Facility: CLINIC | Age: 30
Discharge: HOME | End: 2025-08-30
Attending: EMERGENCY MEDICINE | Admitting: EMERGENCY MEDICINE
Payer: COMMERCIAL

## 2025-08-30 VITALS
OXYGEN SATURATION: 94 % | DIASTOLIC BLOOD PRESSURE: 70 MMHG | TEMPERATURE: 98.2 F | HEART RATE: 78 BPM | WEIGHT: 207 LBS | BODY MASS INDEX: 31.94 KG/M2 | RESPIRATION RATE: 16 BRPM | SYSTOLIC BLOOD PRESSURE: 117 MMHG

## 2025-08-30 DIAGNOSIS — R11.2 NAUSEA AND VOMITING, UNSPECIFIED VOMITING TYPE: ICD-10-CM

## 2025-08-30 DIAGNOSIS — R10.11 RIGHT UPPER QUADRANT ABDOMINAL PAIN: Primary | ICD-10-CM

## 2025-08-30 LAB
ALBUMIN SERPL BCG-MCNC: 4.7 G/DL (ref 3.5–5.2)
ALBUMIN UR-MCNC: 10 MG/DL
ALP SERPL-CCNC: 67 U/L (ref 40–150)
ALT SERPL W P-5'-P-CCNC: 12 U/L (ref 0–50)
ANION GAP SERPL CALCULATED.3IONS-SCNC: 12 MMOL/L (ref 7–15)
APPEARANCE UR: CLEAR
AST SERPL W P-5'-P-CCNC: 17 U/L (ref 0–45)
BASOPHILS # BLD AUTO: 0.08 10E3/UL (ref 0–0.2)
BASOPHILS NFR BLD AUTO: 0.8 %
BILIRUB SERPL-MCNC: 0.3 MG/DL
BILIRUB UR QL STRIP: NEGATIVE
BUN SERPL-MCNC: 9 MG/DL (ref 6–20)
CALCIUM SERPL-MCNC: 9.5 MG/DL (ref 8.8–10.4)
CHLORIDE SERPL-SCNC: 107 MMOL/L (ref 98–107)
COLOR UR AUTO: YELLOW
CREAT SERPL-MCNC: 0.63 MG/DL (ref 0.51–0.95)
EGFRCR SERPLBLD CKD-EPI 2021: >90 ML/MIN/1.73M2
EOSINOPHIL # BLD AUTO: 0.13 10E3/UL (ref 0–0.7)
EOSINOPHIL NFR BLD AUTO: 1.3 %
ERYTHROCYTE [DISTWIDTH] IN BLOOD BY AUTOMATED COUNT: 12.1 % (ref 10–15)
GLUCOSE SERPL-MCNC: 137 MG/DL (ref 70–99)
GLUCOSE UR STRIP-MCNC: NEGATIVE MG/DL
HCG SERPL QL: NEGATIVE
HCO3 SERPL-SCNC: 22 MMOL/L (ref 22–29)
HCT VFR BLD AUTO: 45.9 % (ref 35–47)
HGB BLD-MCNC: 15.2 G/DL (ref 11.7–15.7)
HGB UR QL STRIP: NEGATIVE
IMM GRANULOCYTES # BLD: 0.04 10E3/UL
IMM GRANULOCYTES NFR BLD: 0.4 %
KETONES UR STRIP-MCNC: 20 MG/DL
LEUKOCYTE ESTERASE UR QL STRIP: NEGATIVE
LIPASE SERPL-CCNC: 30 U/L (ref 13–60)
LYMPHOCYTES # BLD AUTO: 1.15 10E3/UL (ref 0.8–5.3)
LYMPHOCYTES NFR BLD AUTO: 11.7 %
MCH RBC QN AUTO: 28.5 PG (ref 26.5–33)
MCHC RBC AUTO-ENTMCNC: 33.1 G/DL (ref 31.5–36.5)
MCV RBC AUTO: 86 FL (ref 78–100)
MONOCYTES # BLD AUTO: 0.63 10E3/UL (ref 0–1.3)
MONOCYTES NFR BLD AUTO: 6.4 %
MUCOUS THREADS #/AREA URNS LPF: PRESENT /LPF
NEUTROPHILS # BLD AUTO: 7.78 10E3/UL (ref 1.6–8.3)
NEUTROPHILS NFR BLD AUTO: 79.4 %
NITRATE UR QL: NEGATIVE
NRBC # BLD AUTO: <0.03 10E3/UL
NRBC BLD AUTO-RTO: 0 /100
PH UR STRIP: 6 [PH] (ref 5–7)
PLATELET # BLD AUTO: 297 10E3/UL (ref 150–450)
POTASSIUM SERPL-SCNC: 4.2 MMOL/L (ref 3.4–5.3)
PROT SERPL-MCNC: 7.4 G/DL (ref 6.4–8.3)
RBC # BLD AUTO: 5.34 10E6/UL (ref 3.8–5.2)
RBC URINE: 2 /HPF
SODIUM SERPL-SCNC: 141 MMOL/L (ref 135–145)
SP GR UR STRIP: 1.03 (ref 1–1.03)
SQUAMOUS EPITHELIAL: <1 /HPF
UROBILINOGEN UR STRIP-MCNC: NORMAL MG/DL
WBC # BLD AUTO: 9.81 10E3/UL (ref 4–11)
WBC URINE: 1 /HPF

## 2025-08-30 PROCEDURE — 85025 COMPLETE CBC W/AUTO DIFF WBC: CPT | Performed by: EMERGENCY MEDICINE

## 2025-08-30 PROCEDURE — 96375 TX/PRO/DX INJ NEW DRUG ADDON: CPT

## 2025-08-30 PROCEDURE — 96374 THER/PROPH/DIAG INJ IV PUSH: CPT

## 2025-08-30 PROCEDURE — 36415 COLL VENOUS BLD VENIPUNCTURE: CPT | Performed by: EMERGENCY MEDICINE

## 2025-08-30 PROCEDURE — 76705 ECHO EXAM OF ABDOMEN: CPT

## 2025-08-30 PROCEDURE — 81003 URINALYSIS AUTO W/O SCOPE: CPT | Performed by: EMERGENCY MEDICINE

## 2025-08-30 PROCEDURE — 84703 CHORIONIC GONADOTROPIN ASSAY: CPT | Performed by: EMERGENCY MEDICINE

## 2025-08-30 PROCEDURE — 258N000003 HC RX IP 258 OP 636: Performed by: EMERGENCY MEDICINE

## 2025-08-30 PROCEDURE — 250N000011 HC RX IP 250 OP 636: Performed by: EMERGENCY MEDICINE

## 2025-08-30 PROCEDURE — 96361 HYDRATE IV INFUSION ADD-ON: CPT

## 2025-08-30 PROCEDURE — 80053 COMPREHEN METABOLIC PANEL: CPT | Performed by: EMERGENCY MEDICINE

## 2025-08-30 PROCEDURE — 83690 ASSAY OF LIPASE: CPT | Performed by: EMERGENCY MEDICINE

## 2025-08-30 RX ORDER — METOCLOPRAMIDE HYDROCHLORIDE 5 MG/ML
5 INJECTION INTRAMUSCULAR; INTRAVENOUS ONCE
Status: COMPLETED | OUTPATIENT
Start: 2025-08-30 | End: 2025-08-30

## 2025-08-30 RX ORDER — KETOROLAC TROMETHAMINE 15 MG/ML
15 INJECTION, SOLUTION INTRAMUSCULAR; INTRAVENOUS ONCE
Status: COMPLETED | OUTPATIENT
Start: 2025-08-30 | End: 2025-08-30

## 2025-08-30 RX ORDER — ONDANSETRON 4 MG/1
4 TABLET, ORALLY DISINTEGRATING ORAL EVERY 8 HOURS PRN
Qty: 10 TABLET | Refills: 0 | Status: SHIPPED | OUTPATIENT
Start: 2025-08-30 | End: 2025-09-02

## 2025-08-30 RX ADMIN — SODIUM CHLORIDE 1000 ML: 0.9 INJECTION, SOLUTION INTRAVENOUS at 00:29

## 2025-08-30 RX ADMIN — METOCLOPRAMIDE 5 MG: 5 INJECTION, SOLUTION INTRAMUSCULAR; INTRAVENOUS at 01:39

## 2025-08-30 RX ADMIN — KETOROLAC TROMETHAMINE 15 MG: 15 INJECTION, SOLUTION INTRAMUSCULAR; INTRAVENOUS at 01:39

## 2025-08-30 RX ADMIN — ONDANSETRON 4 MG: 2 INJECTION, SOLUTION INTRAMUSCULAR; INTRAVENOUS at 00:28

## 2025-08-30 ASSESSMENT — ENCOUNTER SYMPTOMS
VOMITING: 1
ABDOMINAL PAIN: 1
NAUSEA: 1
COUGH: 1

## 2025-08-30 ASSESSMENT — ACTIVITIES OF DAILY LIVING (ADL)
ADLS_ACUITY_SCORE: 50
ADLS_ACUITY_SCORE: 50

## (undated) DEVICE — SUTURE VICRYL+ 0 27IN CT-1 UND VCP260H

## (undated) DEVICE — GLOVE BIOGEL PI ULTRATOUCH G SZ 8.0 42180

## (undated) DEVICE — SOL WATER IRRIG 1000ML BOTTLE 2F7114

## (undated) DEVICE — SU VICRYL+ 3-0 CT1 36IN UND VCP944H

## (undated) DEVICE — ESU GROUND PAD ADULT REM W/15' CORD E7507DB

## (undated) DEVICE — SU MONOCRYL+ 4-0 18IN PS2 UND MCP496G

## (undated) DEVICE — CUSTOM PACK C-SECTION LHE

## (undated) DEVICE — PACK MINOR SINGLE BASIN SSK3001

## (undated) DEVICE — SUCTION MANIFOLD NEPTUNE 2 SYS 1 PORT 702-025-000

## (undated) DEVICE — PREP CHLORAPREP 26ML TINTED HI-LITE ORANGE 930815

## (undated) DEVICE — SOL NACL 0.9% IRRIG 1000ML BOTTLE 2F7124

## (undated) DEVICE — Device

## (undated) DEVICE — DRSG PRIMAPORE 04X11 3/4"

## (undated) RX ORDER — MORPHINE SULFATE 1 MG/ML
INJECTION, SOLUTION EPIDURAL; INTRATHECAL; INTRAVENOUS
Status: DISPENSED
Start: 2024-11-24

## (undated) RX ORDER — KETOROLAC TROMETHAMINE 30 MG/ML
INJECTION, SOLUTION INTRAMUSCULAR; INTRAVENOUS
Status: DISPENSED
Start: 2024-11-24

## (undated) RX ORDER — FENTANYL CITRATE 50 UG/ML
INJECTION, SOLUTION INTRAMUSCULAR; INTRAVENOUS
Status: DISPENSED
Start: 2024-11-24

## (undated) RX ORDER — TRANEXAMIC ACID 100 MG/ML
INJECTION, SOLUTION INTRAVENOUS
Status: DISPENSED
Start: 2024-11-24

## (undated) RX ORDER — ONDANSETRON 2 MG/ML
INJECTION INTRAMUSCULAR; INTRAVENOUS
Status: DISPENSED
Start: 2024-11-24

## (undated) RX ORDER — OXYTOCIN 10 [USP'U]/ML
INJECTION, SOLUTION INTRAMUSCULAR; INTRAVENOUS
Status: DISPENSED
Start: 2024-11-24

## (undated) RX ORDER — CEFAZOLIN SODIUM 1 G/3ML
INJECTION, POWDER, FOR SOLUTION INTRAMUSCULAR; INTRAVENOUS
Status: DISPENSED
Start: 2024-11-24

## (undated) RX ORDER — DEXAMETHASONE SODIUM PHOSPHATE 10 MG/ML
INJECTION, SOLUTION INTRAMUSCULAR; INTRAVENOUS
Status: DISPENSED
Start: 2024-11-24